# Patient Record
Sex: FEMALE | Race: BLACK OR AFRICAN AMERICAN | Employment: PART TIME | URBAN - METROPOLITAN AREA
[De-identification: names, ages, dates, MRNs, and addresses within clinical notes are randomized per-mention and may not be internally consistent; named-entity substitution may affect disease eponyms.]

---

## 2017-02-27 ENCOUNTER — HOSPITAL ENCOUNTER (OUTPATIENT)
Dept: LAB | Age: 52
Discharge: HOME | End: 2017-02-27
Payer: COMMERCIAL

## 2017-02-27 LAB
T4 FREE: 1.06 NG/DL (ref 0.93–1.7)
TSH SERPL DL<=0.05 MIU/L-ACNC: 1.51 UIU/ML (ref 0.27–4.2)

## 2017-02-27 PROCEDURE — 84443 ASSAY THYROID STIM HORMONE: CPT

## 2017-02-27 PROCEDURE — 84439 ASSAY OF FREE THYROXINE: CPT

## 2017-06-29 ENCOUNTER — HOSPITAL ENCOUNTER (OUTPATIENT)
Dept: WOMENS IMAGING | Age: 52
Discharge: HOME | End: 2017-06-29
Payer: COMMERCIAL

## 2017-06-29 DIAGNOSIS — Z12.31 ENCOUNTER FOR SCREENING MAMMOGRAM FOR BREAST CANCER: ICD-10-CM

## 2017-06-29 PROCEDURE — G0202 SCR MAMMO BI INCL CAD: HCPCS

## 2017-12-14 ENCOUNTER — INITIAL CONSULT (OUTPATIENT)
Dept: SURGERY | Age: 52
End: 2017-12-14

## 2017-12-14 VITALS
WEIGHT: 114 LBS | TEMPERATURE: 97.6 F | DIASTOLIC BLOOD PRESSURE: 90 MMHG | HEIGHT: 69 IN | RESPIRATION RATE: 12 BRPM | BODY MASS INDEX: 16.88 KG/M2 | HEART RATE: 100 BPM | SYSTOLIC BLOOD PRESSURE: 130 MMHG

## 2017-12-14 DIAGNOSIS — R10.9 ABDOMINAL PAIN, UNSPECIFIED ABDOMINAL LOCATION: Primary | ICD-10-CM

## 2017-12-14 PROCEDURE — 99204 OFFICE O/P NEW MOD 45 MIN: CPT | Performed by: SURGERY

## 2017-12-14 RX ORDER — LEVOTHYROXINE SODIUM 0.03 MG/1
TABLET ORAL
Refills: 5 | COMMUNITY
Start: 2017-11-06

## 2017-12-14 ASSESSMENT — ENCOUNTER SYMPTOMS
BACK PAIN: 0
EYE DISCHARGE: 0
TROUBLE SWALLOWING: 0
VOMITING: 0
CONSTIPATION: 0
STRIDOR: 0
CHEST TIGHTNESS: 0
EYE PAIN: 0
ANAL BLEEDING: 0
ABDOMINAL PAIN: 0
SHORTNESS OF BREATH: 0
COLOR CHANGE: 0

## 2017-12-14 NOTE — PROGRESS NOTES
Oneta Sensor      I have reviewed the Medical Assistant's entries in the Past Medical History, Medications, Allergies, Social History and Vital Sign sections      Chief Complaint   Patient presents with   Vergie Generous         HPI      46 F seen from [facility] and [place] for abd pain    Pt c/o weight loss of 15 lbs over last several months    Notes periodic n/v  Emesis not related to any one food or activity  Mild upper quadrant pain    Recent Ba swallow 12/17 neg    BMs irregular  No blood        S/P TAHBSO    S/P cholecystectomy  S/p appendectomy    No colonoscopy in [facility] or [facility]                                PMH    Yes                      Hypertension  No                      Lipids/cholesterol  No                      ASCAD  No                      Diabetes      hypthyroid      Smoker/ COPD    SOC        Social:                    Alcohol: admits to a bottle of vodka every 2 days          Past Medical History:   Diagnosis Date    Arthritis     Hyperlipidemia     Hypertension     Thyroid disease      Past Surgical History:   Procedure Laterality Date    APPENDECTOMY      BIOPSY LIP N/A 10/13/2016    EXCISION UPPER LIP LESION  performed by Lorena Mullins MD at [address] CHOLECYSTECTOMY      ENDOSCOPY, COLON, DIAGNOSTIC      HYSTERECTOMY           Current problems include  Patient Active Problem List   Diagnosis    Hyperthyroidism           Social History     Social History    Marital status:      Spouse name: N/A    Number of children: N/A    Years of education: N/A     Social History Main Topics    Smoking status: Current Every Day Smoker    Smokeless tobacco: Never Used    Alcohol use Yes    Drug use: Unknown    Sexual activity: Not Asked     Other Topics Concern    None     Social History Narrative    None                 No family history on file.       Current Outpatient Prescriptions   Medication Sig Dispense Refill    levothyroxine (SYNTHROID) 25 MCG tablet TAKE ONE TABLET BY MOUTH EVERY DAY  5    propranolol (INDERAL) 10 MG tablet Take 10 mg by mouth 2 times daily      Levothyroxine Sodium (LEVOTHROID PO) Take by mouth daily      vitamin B-1 (THIAMINE) 100 MG tablet Take by mouth three times daily      ferrous sulfate 325 (65 FE) MG tablet Take by mouth three times daily      meclizine (ANTIVERT) 12.5 MG tablet Take by mouth 3 times daily as needed      cephALEXin (KEFLEX) 250 MG capsule Take 1 capsule by mouth 3 times daily 20 capsule 0     No current facility-administered medications for this visit. No Known Allergies      Review of Systems   Constitutional: Negative for chills, fatigue, fever and unexpected weight change. HENT: Negative for nosebleeds and trouble swallowing. Eyes: Negative for pain and discharge. Respiratory: Negative for chest tightness, shortness of breath and stridor. Cardiovascular: Negative for chest pain, palpitations and leg swelling. Gastrointestinal: Negative for abdominal pain, anal bleeding, constipation and vomiting. Genitourinary: Negative for difficulty urinating, dysuria and hematuria. Musculoskeletal: Negative for back pain, joint swelling and neck pain. Skin: Negative for color change, rash and wound. Neurological: Negative for seizures, facial asymmetry, speech difficulty and headaches. Hematological: Negative for adenopathy. Does not bruise/bleed easily. Psychiatric/Behavioral: Negative for behavioral problems and hallucinations. The patient is not nervous/anxious. Vitals:    12/14/17 1047   BP: (!) 130/90   Pulse: 100   Resp: 12   Temp: 97.6 °F (36.4 °C)           Physical Exam   Constitutional: She is oriented to person, place, and time. She appears well-developed and well-nourished. HENT:   Head: Normocephalic and atraumatic. Eyes: EOM are normal. Pupils are equal, round, and reactive to light. Neck: Normal range of motion. Neck supple.    Cardiovascular: Normal rate and regular rhythm. No murmur heard. Pulmonary/Chest: Effort normal and breath sounds normal. No respiratory distress. She has no wheezes. She has no rales. Abdominal: She exhibits no distension and no mass. There is no tenderness. There is no rebound. Scars at umbilicus   Musculoskeletal: Normal range of motion. She exhibits no edema. Neurological: She is alert and oriented to person, place, and time. No cranial nerve deficit. Skin: Skin is warm and dry. Psychiatric: She has a normal mood and affect.  Her behavior is normal.               Assessment/      abd pain and weight loss of uncertain etiology  Acute and chronic EtOH  Change in BMs                Plan/      Will check CT A/P and colonoscopy     Re assess after above        Jorge Rojas MD

## 2017-12-15 ENCOUNTER — TELEPHONE (OUTPATIENT)
Dept: SURGERY | Age: 52
End: 2017-12-15

## 2017-12-15 NOTE — TELEPHONE ENCOUNTER
Patient was ordered a CT OF THE ABD AND PELVIS W/ CONTRAST. Called [facility] to get authorization. Had to go for further review, could not authorize at this time. Waiting to hear if they ok the scan.   Tracking # [id number]

## 2017-12-19 ENCOUNTER — HOSPITAL ENCOUNTER (OUTPATIENT)
Dept: LAB | Age: 52
Discharge: HOME | End: 2017-12-19
Payer: COMMERCIAL

## 2017-12-19 LAB
HCT VFR BLD CALC: 33.2 % (ref 37–47)
HEMOGLOBIN: 10.8 G/DL (ref 12–16)
MCH RBC QN AUTO: 32.3 PG (ref 27–31.3)
MCHC RBC AUTO-ENTMCNC: 32.4 % (ref 33–37)
MCV RBC AUTO: 99.7 FL (ref 82–100)
PDW BLD-RTO: 14.9 % (ref 11.5–14.5)
PLATELET # BLD: 223 K/UL (ref 130–400)
RBC # BLD: 3.33 M/UL (ref 4.2–5.4)
WBC # BLD: 4.4 K/UL (ref 4.8–10.8)

## 2017-12-19 PROCEDURE — 83690 ASSAY OF LIPASE: CPT

## 2017-12-19 PROCEDURE — 84443 ASSAY THYROID STIM HORMONE: CPT

## 2017-12-19 PROCEDURE — 85027 COMPLETE CBC AUTOMATED: CPT

## 2017-12-19 PROCEDURE — 82150 ASSAY OF AMYLASE: CPT

## 2017-12-19 PROCEDURE — 84439 ASSAY OF FREE THYROXINE: CPT

## 2017-12-19 PROCEDURE — 80053 COMPREHEN METABOLIC PANEL: CPT

## 2017-12-20 LAB
ALBUMIN SERPL-MCNC: 3.8 G/DL (ref 3.9–4.9)
ALP BLD-CCNC: 50 U/L (ref 40–130)
ALT SERPL-CCNC: 14 U/L (ref 0–33)
AMYLASE: 109 U/L (ref 28–100)
ANION GAP SERPL CALCULATED.3IONS-SCNC: 17 MEQ/L (ref 7–13)
AST SERPL-CCNC: 22 U/L (ref 0–35)
BILIRUB SERPL-MCNC: 0.4 MG/DL (ref 0–1.2)
BUN BLDV-MCNC: 9 MG/DL (ref 6–20)
CALCIUM SERPL-MCNC: 9 MG/DL (ref 8.6–10.2)
CHLORIDE BLD-SCNC: 101 MEQ/L (ref 98–107)
CO2: 23 MEQ/L (ref 22–29)
CREAT SERPL-MCNC: 0.38 MG/DL (ref 0.5–0.9)
GFR AFRICAN AMERICAN: >60
GFR NON-AFRICAN AMERICAN: >60
GLOBULIN: 2.6 G/DL (ref 2.3–3.5)
GLUCOSE BLD-MCNC: 73 MG/DL (ref 74–109)
LIPASE: 33 U/L (ref 13–60)
POTASSIUM SERPL-SCNC: 3.8 MEQ/L (ref 3.5–5.1)
SODIUM BLD-SCNC: 141 MEQ/L (ref 132–144)
T4 FREE: 1 NG/DL (ref 0.93–1.7)
TOTAL PROTEIN: 6.4 G/DL (ref 6.4–8.1)
TSH SERPL DL<=0.05 MIU/L-ACNC: 0.89 UIU/ML (ref 0.27–4.2)

## 2018-01-03 ENCOUNTER — TELEPHONE (OUTPATIENT)
Dept: SURGERY | Age: 53
End: 2018-01-03

## 2018-01-03 NOTE — TELEPHONE ENCOUNTER
[facility] My[facility] Jeanette called and was requiring about the status of her CT Scan Dr. Kaylen Champion had wanted her to have. I explained to her \" That her insurance was denying the test\" I explained to her Dr. Kaylen Champion will have to do a pier to pier with the insurance company to see if he can change their mind.  I will get with Dr. Kaylen Champion as soon as I see him back in the office, and call her back to let her know what is going on./ [facility]

## 2018-07-18 ENCOUNTER — HOSPITAL ENCOUNTER (OUTPATIENT)
Dept: LAB | Age: 53
Discharge: HOME | End: 2018-07-18
Payer: COMMERCIAL

## 2018-12-19 ENCOUNTER — HOSPITAL ENCOUNTER (OUTPATIENT)
Dept: WOMENS IMAGING | Age: 53
Discharge: HOME | End: 2018-12-21
Payer: COMMERCIAL

## 2018-12-19 DIAGNOSIS — Z12.31 ENCOUNTER FOR SCREENING MAMMOGRAM FOR BREAST CANCER: ICD-10-CM

## 2018-12-19 PROCEDURE — 77067 SCR MAMMO BI INCL CAD: CPT

## 2019-09-11 ENCOUNTER — HOSPITAL ENCOUNTER (OUTPATIENT)
Dept: LAB | Age: 54
Discharge: HOME | End: 2019-09-11
Payer: COMMERCIAL

## 2022-07-14 LAB
ALBUMIN: 4.7 G/DL (ref 3.4–5)
ALP BLD-CCNC: 152 U/L (ref 33–110)
ALT SERPL-CCNC: 42 U/L (ref 7–45)
ANION GAP SERPL CALCULATED.3IONS-SCNC: 20 MMOL/L (ref 10–20)
AST SERPL-CCNC: 107 U/L (ref 9–39)
BICARBONATE: 22 MMOL/L (ref 21–32)
BILIRUB SERPL-MCNC: 1.3 MG/DL (ref 0–1.2)
CALCIUM SERPL-MCNC: 9.9 MG/DL (ref 8.6–10.3)
CHLORIDE BLD-SCNC: 95 MMOL/L (ref 98–107)
CHOLESTEROL/HDL RATIO: 1.7
CHOLESTEROL: 265 MG/DL (ref 0–199)
CREAT SERPL-MCNC: 0.49 MG/DL (ref 0.5–1)
EGFR FEMALE: >90 ML/MIN/1.73M2
ERYTHROCYTE [DISTWIDTH] IN BLOOD BY AUTOMATED COUNT: 16.2 % (ref 11.5–14)
ESTIMATED AVERAGE GLUCOSE: 80 MG/DL
FOLATE: 7.7 NG/ML
GLUCOSE: 89 MG/DL (ref 74–99)
HBA1C MFR BLD: 4.4 %
HCT VFR BLD CALC: 34.1 % (ref 36–46)
HDLC SERPL-MCNC: 158 MG/DL
HEMOGLOBIN: 11.4 G/DL (ref 12–16)
LDL CHOLESTEROL: 95 MG/DL (ref 0–99)
MCHC RBC AUTO-ENTMCNC: 33.4 G/DL (ref 32–36)
MCV RBC AUTO: 100 FL (ref 80–100)
NUCLEATED RBCS: 0.4 /100 WBC (ref 0–0)
PLATELET # BLD: 150 X10E9/L (ref 150–450)
POTASSIUM SERPL-SCNC: 3.4 MMOL/L (ref 3.5–5.3)
RBC # BLD: 3.41 X10E12/L (ref 4–5.2)
SODIUM BLD-SCNC: 134 MMOL/L (ref 136–145)
TOTAL PROTEIN: 7.8 G/DL (ref 6.4–8.2)
TRIGL SERPL-MCNC: 58 MG/DL (ref 0–149)
TSH SERPL DL<=0.05 MIU/L-ACNC: 1.65 MIU/L (ref 0.44–3.9)
UREA NITROGEN: 11 MG/DL (ref 6–23)
VITAMIN B-12: 298 PG/ML (ref 211–911)
VITAMIN D 25-HYDROXY: 13 NG/ML
VLDLC SERPL CALC-MCNC: 12 MG/DL (ref 0–40)
WBC: 5 X10E9/L (ref 4.4–11.3)

## 2022-07-15 LAB — ANA WITH REFLEX TO ENA: NEGATIVE

## 2023-03-27 ENCOUNTER — TELEPHONE (OUTPATIENT)
Dept: PRIMARY CARE | Facility: CLINIC | Age: 58
End: 2023-03-27
Payer: MEDICAID

## 2023-03-28 LAB
AMMONIA (UMOL/L) IN PLASMA: 58 UMOL/L
AMMONIA: 58 UMOL/L
ANION GAP IN SER/PLAS: 15 MMOL/L (ref 10–20)
CALCIDIOL (25 OH VITAMIN D3) (NG/ML) IN SER/PLAS: 85 NG/ML
CALCIUM (MG/DL) IN SER/PLAS: 10.2 MG/DL (ref 8.6–10.3)
CARBON DIOXIDE, TOTAL (MMOL/L) IN SER/PLAS: 27 MMOL/L (ref 21–32)
CHLORIDE (MMOL/L) IN SER/PLAS: 99 MMOL/L (ref 98–107)
COBALAMIN (VITAMIN B12) (PG/ML) IN SER/PLAS: 321 PG/ML (ref 211–911)
CREATININE (MG/DL) IN SER/PLAS: 0.57 MG/DL (ref 0.5–1.05)
ERYTHROCYTE DISTRIBUTION WIDTH (RATIO) BY AUTOMATED COUNT: 16.2 % (ref 11.5–14.5)
ERYTHROCYTE MEAN CORPUSCULAR HEMOGLOBIN CONCENTRATION (G/DL) BY AUTOMATED: 33.3 G/DL (ref 32–36)
ERYTHROCYTE MEAN CORPUSCULAR VOLUME (FL) BY AUTOMATED COUNT: 99 FL (ref 80–100)
ERYTHROCYTE [DISTWIDTH] IN BLOOD BY AUTOMATED COUNT: 16.2 % (ref 11.5–14)
ERYTHROCYTES (10*6/UL) IN BLOOD BY AUTOMATED COUNT: 3.74 X10E12/L (ref 4–5.2)
ESTIMATED AVERAGE GLUCOSE FOR HBA1C: 85 MG/DL
ESTIMATED AVERAGE GLUCOSE: 85 MG/DL
FOLATE (NG/ML) IN SER/PLAS: 9.6 NG/ML
FOLATE: 9.6 NG/ML
GFR FEMALE: >90 ML/MIN/1.73M2
GLUCOSE (MG/DL) IN SER/PLAS: 108 MG/DL (ref 74–99)
HBA1C MFR BLD: 4.6 %
HCT VFR BLD CALC: 37.2 % (ref 36–46)
HEMATOCRIT (%) IN BLOOD BY AUTOMATED COUNT: 37.2 % (ref 36–46)
HEMOGLOBIN (G/DL) IN BLOOD: 12.4 G/DL (ref 12–16)
HEMOGLOBIN A1C/HEMOGLOBIN TOTAL IN BLOOD: 4.6 %
HEMOGLOBIN: 12.4 G/DL (ref 12–16)
LEUKOCYTES (10*3/UL) IN BLOOD BY AUTOMATED COUNT: 4.6 X10E9/L (ref 4.4–11.3)
MCHC RBC AUTO-ENTMCNC: 33.3 G/DL (ref 32–36)
MCV RBC AUTO: 99 FL (ref 80–100)
PLATELET # BLD: 245 X10E9/L (ref 150–450)
PLATELETS (10*3/UL) IN BLOOD AUTOMATED COUNT: 245 X10E9/L (ref 150–450)
POTASSIUM (MMOL/L) IN SER/PLAS: 3.5 MMOL/L (ref 3.5–5.3)
RBC # BLD: 3.74 X10E12/L (ref 4–5.2)
SODIUM (MMOL/L) IN SER/PLAS: 137 MMOL/L (ref 136–145)
THYROTROPIN (MIU/L) IN SER/PLAS BY DETECTION LIMIT <= 0.05 MIU/L: 1.5 MIU/L (ref 0.44–3.98)
TSH SERPL DL<=0.05 MIU/L-ACNC: 1.5 MIU/L (ref 0.44–3.9)
URATE (MG/DL) IN SER/PLAS: 5.8 MG/DL (ref 2.3–6.7)
UREA NITROGEN (MG/DL) IN SER/PLAS: 10 MG/DL (ref 6–23)
URIC ACID: 5.8 MG/DL (ref 2.3–6.7)
VITAMIN B-12: 321 PG/ML (ref 211–911)
VITAMIN D 25-HYDROXY: 85 NG/ML
WBC: 4.6 X10E9/L (ref 4.4–11.3)

## 2023-03-31 LAB
ALBUMIN ELP: 4.3 G/DL (ref 3.4–5)
ALBUMIN SERPL-MCNC: 4.3 G/DL (ref 3.4–5)
ALPHA 1: 0.3 G/DL (ref 0.2–0.6)
ALPHA 1: 0.3 G/DL (ref 0.2–0.6)
ALPHA 2: 0.9 G/DL (ref 0.4–1.1)
ALPHA 2: 0.9 G/DL (ref 0.4–1.1)
BETA: 0.9 G/DL (ref 0.5–1.2)
BETA: 0.9 G/DL (ref 0.5–1.2)
GAMMA GLOBULIN: 1 G/DL (ref 0.5–1.4)
GAMMA: 1 G/DL (ref 0.5–1.4)
IMMUNOFIXATION RESULT, SERUM: NORMAL
INTERPRETATION: NORMAL
PATH REVIEW - SERUM IMMUNOFIXATION: NORMAL
PATH REVIEW-SERUM PROTEIN ELECTROPHORESIS: NORMAL
PATHOLOGIST REVIEW: NORMAL
PATHOLOGIST REVIEW: NORMAL
PROTEIN ELECTROPHORESIS INTERPRETATION: NORMAL
PROTEIN TOTAL: 7.4 G/DL (ref 6.4–8.2)
SERUM IMMUNOFIXATION INTERPRETATION: NORMAL
TOTAL PROTEIN: 7.4 G/DL (ref 6.4–8.2)

## 2023-08-15 LAB
ALANINE AMINOTRANSFERASE (SGPT) (U/L) IN SER/PLAS: 18 U/L (ref 7–45)
ALBUMIN (G/DL) IN SER/PLAS: 4.1 G/DL (ref 3.4–5)
ALBUMIN (MG/L) IN URINE: 204 MG/L
ALBUMIN/CREATININE (UG/MG) IN URINE: 47.9 UG/MG CRT (ref 0–30)
ALKALINE PHOSPHATASE (U/L) IN SER/PLAS: 82 U/L (ref 33–110)
ANION GAP IN SER/PLAS: 15 MMOL/L (ref 10–20)
APPEARANCE, URINE: ABNORMAL
ASPARTATE AMINOTRANSFERASE (SGOT) (U/L) IN SER/PLAS: 54 U/L (ref 9–39)
BACTERIA, URINE: ABNORMAL /HPF
BASOPHILS (10*3/UL) IN BLOOD BY AUTOMATED COUNT: 0.03 X10E9/L (ref 0–0.1)
BASOPHILS/100 LEUKOCYTES IN BLOOD BY AUTOMATED COUNT: 0.5 % (ref 0–2)
BILIRUBIN TOTAL (MG/DL) IN SER/PLAS: 1.7 MG/DL (ref 0–1.2)
BILIRUBIN, URINE: ABNORMAL
BLOOD, URINE: ABNORMAL
CALCIDIOL (25 OH VITAMIN D3) (NG/ML) IN SER/PLAS: 44 NG/ML
CALCIUM (MG/DL) IN SER/PLAS: 9.6 MG/DL (ref 8.6–10.3)
CARBON DIOXIDE, TOTAL (MMOL/L) IN SER/PLAS: 28 MMOL/L (ref 21–32)
CHLORIDE (MMOL/L) IN SER/PLAS: 98 MMOL/L (ref 98–107)
CHOLESTEROL (MG/DL) IN SER/PLAS: 196 MG/DL (ref 0–199)
CHOLESTEROL IN HDL (MG/DL) IN SER/PLAS: 123.6 MG/DL
CHOLESTEROL/HDL RATIO: 1.6
COBALAMIN (VITAMIN B12) (PG/ML) IN SER/PLAS: 248 PG/ML (ref 211–911)
COLOR, URINE: ABNORMAL
CREATINE KINASE (U/L) IN SER/PLAS: 43 U/L (ref 0–215)
CREATININE (MG/DL) IN SER/PLAS: 0.53 MG/DL (ref 0.5–1.05)
CREATININE (MG/DL) IN URINE: 426 MG/DL (ref 20–320)
CREATININE (MG/DL) IN URINE: 426 MG/DL (ref 20–320)
EOSINOPHILS (10*3/UL) IN BLOOD BY AUTOMATED COUNT: 0.02 X10E9/L (ref 0–0.7)
EOSINOPHILS/100 LEUKOCYTES IN BLOOD BY AUTOMATED COUNT: 0.3 % (ref 0–6)
ERYTHROCYTE DISTRIBUTION WIDTH (RATIO) BY AUTOMATED COUNT: 18 % (ref 11.5–14.5)
ERYTHROCYTE MEAN CORPUSCULAR HEMOGLOBIN CONCENTRATION (G/DL) BY AUTOMATED: 33.2 G/DL (ref 32–36)
ERYTHROCYTE MEAN CORPUSCULAR VOLUME (FL) BY AUTOMATED COUNT: 106 FL (ref 80–100)
ERYTHROCYTES (10*6/UL) IN BLOOD BY AUTOMATED COUNT: 2.93 X10E12/L (ref 4–5.2)
FERRITIN (UG/LL) IN SER/PLAS: 889 UG/L (ref 8–150)
GFR FEMALE: >90 ML/MIN/1.73M2
GLUCOSE (MG/DL) IN SER/PLAS: 96 MG/DL (ref 74–99)
GLUCOSE, URINE: NEGATIVE MG/DL
HEMATOCRIT (%) IN BLOOD BY AUTOMATED COUNT: 31 % (ref 36–46)
HEMOGLOBIN (G/DL) IN BLOOD: 10.3 G/DL (ref 12–16)
IMMATURE GRANULOCYTES/100 LEUKOCYTES IN BLOOD BY AUTOMATED COUNT: 0.3 % (ref 0–0.9)
IRON (UG/DL) IN SER/PLAS: 49 UG/DL (ref 35–150)
IRON BINDING CAPACITY (UG/DL) IN SER/PLAS: 339 UG/DL (ref 240–445)
IRON SATURATION (%) IN SER/PLAS: 14 % (ref 25–45)
KETONES, URINE: ABNORMAL MG/DL
LDL: 60 MG/DL (ref 0–99)
LEUKOCYTE ESTERASE, URINE: ABNORMAL
LEUKOCYTES (10*3/UL) IN BLOOD BY AUTOMATED COUNT: 5.8 X10E9/L (ref 4.4–11.3)
LYMPHOCYTES (10*3/UL) IN BLOOD BY AUTOMATED COUNT: 1.15 X10E9/L (ref 1.2–4.8)
LYMPHOCYTES/100 LEUKOCYTES IN BLOOD BY AUTOMATED COUNT: 19.8 % (ref 13–44)
MAGNESIUM (MG/DL) IN SER/PLAS: 1.27 MG/DL (ref 1.6–2.4)
MONOCYTES (10*3/UL) IN BLOOD BY AUTOMATED COUNT: 0.33 X10E9/L (ref 0.1–1)
MONOCYTES/100 LEUKOCYTES IN BLOOD BY AUTOMATED COUNT: 5.7 % (ref 2–10)
MUCUS, URINE: ABNORMAL /LPF
NEUTROPHILS (10*3/UL) IN BLOOD BY AUTOMATED COUNT: 4.25 X10E9/L (ref 1.2–7.7)
NEUTROPHILS/100 LEUKOCYTES IN BLOOD BY AUTOMATED COUNT: 73.4 % (ref 40–80)
NITRITE, URINE: POSITIVE
NRBC (PER 100 WBCS) BY AUTOMATED COUNT: 0.5 /100 WBC (ref 0–0)
PH, URINE: 5 (ref 5–8)
PLATELETS (10*3/UL) IN BLOOD AUTOMATED COUNT: 268 X10E9/L (ref 150–450)
POTASSIUM (MMOL/L) IN SER/PLAS: 4.1 MMOL/L (ref 3.5–5.3)
PROTEIN (MG/DL) IN URINE: 60 MG/DL (ref 5–24)
PROTEIN TOTAL: 7.3 G/DL (ref 6.4–8.2)
PROTEIN, URINE: ABNORMAL MG/DL
PROTEIN/CREATININE (MG/MG) IN URINE: 0.14 MG/MG CREAT (ref 0–0.17)
RBC, URINE: ABNORMAL /HPF (ref 0–5)
RHEUMATOID FACTOR (IU/ML) IN SERUM OR PLASMA: 10 IU/ML (ref 0–15)
SEDIMENTATION RATE, ERYTHROCYTE: 10 MM/H (ref 0–30)
SODIUM (MMOL/L) IN SER/PLAS: 137 MMOL/L (ref 136–145)
SPECIFIC GRAVITY, URINE: 1.02 (ref 1–1.03)
SQUAMOUS EPITHELIAL CELLS, URINE: 2 /HPF
THYROTROPIN (MIU/L) IN SER/PLAS BY DETECTION LIMIT <= 0.05 MIU/L: 1.67 MIU/L (ref 0.44–3.98)
TRIGLYCERIDE (MG/DL) IN SER/PLAS: 63 MG/DL (ref 0–149)
URATE (MG/DL) IN SER/PLAS: 4.8 MG/DL (ref 2.3–6.7)
UREA NITROGEN (MG/DL) IN SER/PLAS: 7 MG/DL (ref 6–23)
UROBILINOGEN, URINE: 4 MG/DL (ref 0–1.9)
VLDL: 13 MG/DL (ref 0–40)
WBC, URINE: >182 /HPF (ref 0–5)

## 2023-08-16 LAB — ANTI-NUCLEAR ANTIBODY (ANA): NEGATIVE

## 2023-10-03 ENCOUNTER — APPOINTMENT (OUTPATIENT)
Dept: RADIOLOGY | Facility: HOSPITAL | Age: 58
End: 2023-10-03
Payer: MEDICAID

## 2023-10-09 ENCOUNTER — TELEPHONE (OUTPATIENT)
Dept: ORTHOPEDIC SURGERY | Facility: CLINIC | Age: 58
End: 2023-10-09
Payer: MEDICAID

## 2023-10-09 NOTE — TELEPHONE ENCOUNTER
VIKA Daigle and David (?) . Letter , questionnaire and patient release was mailed on 9-21 . She is reaching out for an update .

## 2023-10-11 ENCOUNTER — HOSPITAL ENCOUNTER (OUTPATIENT)
Dept: RADIOLOGY | Facility: HOSPITAL | Age: 58
Discharge: HOME | End: 2023-10-11
Payer: MEDICAID

## 2023-10-11 DIAGNOSIS — N23 UNSPECIFIED RENAL COLIC: ICD-10-CM

## 2023-10-11 PROCEDURE — 76770 US EXAM ABDO BACK WALL COMP: CPT | Performed by: RADIOLOGY

## 2023-10-11 PROCEDURE — 76770 US EXAM ABDO BACK WALL COMP: CPT

## 2023-10-30 PROBLEM — F17.200 NICOTINE DEPENDENCE: Status: ACTIVE | Noted: 2023-10-30

## 2023-10-30 PROBLEM — R27.0 ATAXIA, UNSPECIFIED: Status: ACTIVE | Noted: 2023-10-30

## 2023-10-30 PROBLEM — I10 HYPERTENSION: Status: ACTIVE | Noted: 2023-10-30

## 2023-10-30 PROBLEM — I77.9 PERIPHERAL ARTERIAL OCCLUSIVE DISEASE (CMS-HCC): Status: ACTIVE | Noted: 2023-10-30

## 2023-10-30 PROBLEM — M17.0 PRIMARY LOCALIZED OSTEOARTHRITIS OF KNEES, BILATERAL: Status: ACTIVE | Noted: 2023-06-14

## 2023-10-30 PROBLEM — R29.6 REPEATED FALLS: Status: ACTIVE | Noted: 2023-10-30

## 2023-10-30 PROBLEM — G62.9 PERIPHERAL NEUROPATHY: Status: ACTIVE | Noted: 2023-10-30

## 2023-10-30 PROBLEM — J44.9 COPD (CHRONIC OBSTRUCTIVE PULMONARY DISEASE) (MULTI): Status: ACTIVE | Noted: 2023-06-14

## 2023-10-30 PROBLEM — M19.071: Status: ACTIVE | Noted: 2023-10-30

## 2023-10-30 PROBLEM — K59.09 CHRONIC CONSTIPATION: Status: ACTIVE | Noted: 2023-10-30

## 2023-10-30 PROBLEM — I95.1 ORTHOSTATIC HYPOTENSION: Status: ACTIVE | Noted: 2023-10-30

## 2023-10-30 PROBLEM — M19.072: Status: ACTIVE | Noted: 2023-10-30

## 2023-10-30 PROBLEM — E46 MALNUTRITION, CALORIE (MULTI): Status: ACTIVE | Noted: 2023-10-30

## 2023-10-30 PROBLEM — E87.6 LOW BLOOD POTASSIUM: Status: ACTIVE | Noted: 2023-10-30

## 2023-10-30 PROBLEM — M15.9 PRIMARY OSTEOARTHRITIS INVOLVING MULTIPLE JOINTS: Status: ACTIVE | Noted: 2023-10-30

## 2023-10-30 PROBLEM — R63.4 UNINTENDED WEIGHT LOSS: Status: ACTIVE | Noted: 2023-10-30

## 2023-10-30 PROBLEM — G72.9 MYOPATHY, UNSPECIFIED: Status: ACTIVE | Noted: 2023-06-14

## 2023-10-30 PROBLEM — R42 DIZZINESS: Status: ACTIVE | Noted: 2023-10-30

## 2023-10-30 PROBLEM — E44.0 MODERATE PROTEIN-CALORIE MALNUTRITION (WEIGHT FOR AGE 60-74% OF STANDARD) (MULTI): Status: ACTIVE | Noted: 2023-10-30

## 2023-10-30 PROBLEM — M15.0 PRIMARY OSTEOARTHRITIS INVOLVING MULTIPLE JOINTS: Status: ACTIVE | Noted: 2023-10-30

## 2023-10-30 PROBLEM — K25.9 GASTRIC ULCER: Status: ACTIVE | Noted: 2023-10-30

## 2023-10-30 RX ORDER — DIPHENHYDRAMINE HCL 25 MG
4 CAPSULE ORAL 4 TIMES DAILY
Status: ON HOLD | COMMUNITY
Start: 2023-08-25 | End: 2024-03-26 | Stop reason: ALTCHOICE

## 2023-10-30 RX ORDER — LANOLIN ALCOHOL/MO/W.PET/CERES
1000 CREAM (GRAM) TOPICAL DAILY
COMMUNITY
Start: 2023-08-21

## 2023-10-30 RX ORDER — LEVOTHYROXINE SODIUM 25 UG/1
1 TABLET ORAL DAILY
Status: ON HOLD | COMMUNITY
Start: 2017-11-06 | End: 2024-03-26 | Stop reason: ALTCHOICE

## 2023-10-30 RX ORDER — PANTOPRAZOLE SODIUM 40 MG/1
40 TABLET, DELAYED RELEASE ORAL DAILY
Status: ON HOLD | COMMUNITY
Start: 2023-08-01 | End: 2024-03-26 | Stop reason: ALTCHOICE

## 2023-10-30 RX ORDER — BUPROPION HYDROCHLORIDE 150 MG/1
150 TABLET ORAL
Status: ON HOLD | COMMUNITY
Start: 2023-10-02 | End: 2024-03-26

## 2023-10-30 RX ORDER — LANOLIN ALCOHOL/MO/W.PET/CERES
1 CREAM (GRAM) TOPICAL 2 TIMES DAILY
COMMUNITY
Start: 2023-09-15

## 2023-10-30 RX ORDER — CYANOCOBALAMIN 1000 UG/ML
1000 INJECTION, SOLUTION INTRAMUSCULAR; SUBCUTANEOUS
Status: ON HOLD | COMMUNITY
Start: 2023-09-15 | End: 2024-03-26 | Stop reason: ALTCHOICE

## 2023-10-30 RX ORDER — MECLIZINE HCL 12.5 MG 12.5 MG/1
12.5 TABLET ORAL 3 TIMES DAILY PRN
Status: ON HOLD | COMMUNITY
End: 2024-03-26 | Stop reason: ALTCHOICE

## 2023-10-30 RX ORDER — FERROUS SULFATE 325(65) MG
65 TABLET ORAL
Status: ON HOLD | COMMUNITY
End: 2024-03-26 | Stop reason: ALTCHOICE

## 2023-10-30 RX ORDER — MELOXICAM 15 MG/1
15 TABLET ORAL DAILY
Status: ON HOLD | COMMUNITY
Start: 2023-10-04 | End: 2024-03-26 | Stop reason: ALTCHOICE

## 2023-10-30 RX ORDER — MIRTAZAPINE 15 MG/1
15 TABLET, FILM COATED ORAL NIGHTLY
Status: ON HOLD | COMMUNITY
Start: 2023-08-21 | End: 2024-03-26 | Stop reason: ALTCHOICE

## 2023-10-31 ENCOUNTER — APPOINTMENT (OUTPATIENT)
Dept: ORTHOPEDIC SURGERY | Facility: CLINIC | Age: 58
End: 2023-10-31
Payer: MEDICAID

## 2023-11-14 ENCOUNTER — APPOINTMENT (OUTPATIENT)
Dept: ORTHOPEDIC SURGERY | Facility: CLINIC | Age: 58
End: 2023-11-14
Payer: MEDICAID

## 2023-11-20 ENCOUNTER — LAB (OUTPATIENT)
Dept: LAB | Facility: LAB | Age: 58
End: 2023-11-20
Payer: MEDICAID

## 2023-11-20 DIAGNOSIS — D64.9 ANEMIA, UNSPECIFIED: ICD-10-CM

## 2023-11-20 DIAGNOSIS — M79.10 MYALGIA, UNSPECIFIED SITE: ICD-10-CM

## 2023-11-20 DIAGNOSIS — I10 ESSENTIAL (PRIMARY) HYPERTENSION: Primary | ICD-10-CM

## 2023-11-20 DIAGNOSIS — N39.0 URINARY TRACT INFECTION, SITE NOT SPECIFIED: ICD-10-CM

## 2023-11-20 DIAGNOSIS — E55.9 VITAMIN D DEFICIENCY, UNSPECIFIED: ICD-10-CM

## 2023-11-20 DIAGNOSIS — E78.5 HYPERLIPIDEMIA, UNSPECIFIED: ICD-10-CM

## 2023-11-20 DIAGNOSIS — M25.50 PAIN IN UNSPECIFIED JOINT: ICD-10-CM

## 2023-11-20 LAB
25(OH)D3 SERPL-MCNC: 30 NG/ML (ref 30–100)
ALBUMIN SERPL BCP-MCNC: 4.5 G/DL (ref 3.4–5)
ALP SERPL-CCNC: 96 U/L (ref 33–110)
ALT SERPL W P-5'-P-CCNC: 21 U/L (ref 7–45)
ANION GAP SERPL CALC-SCNC: 17 MMOL/L (ref 10–20)
APPEARANCE UR: CLEAR
AST SERPL W P-5'-P-CCNC: 61 U/L (ref 9–39)
BASOPHILS # BLD AUTO: 0.06 X10*3/UL (ref 0–0.1)
BASOPHILS NFR BLD AUTO: 1.4 %
BILIRUB SERPL-MCNC: 0.8 MG/DL (ref 0–1.2)
BILIRUB UR STRIP.AUTO-MCNC: NEGATIVE MG/DL
BUN SERPL-MCNC: 10 MG/DL (ref 6–23)
CALCIUM SERPL-MCNC: 9.9 MG/DL (ref 8.6–10.3)
CHLORIDE SERPL-SCNC: 95 MMOL/L (ref 98–107)
CHOLEST SERPL-MCNC: 279 MG/DL (ref 0–199)
CHOLESTEROL/HDL RATIO: 1.5
CK SERPL-CCNC: 48 U/L (ref 0–215)
CO2 SERPL-SCNC: 27 MMOL/L (ref 21–32)
COLOR UR: YELLOW
CREAT SERPL-MCNC: 0.55 MG/DL (ref 0.5–1.05)
CREAT UR-MCNC: 100.4 MG/DL (ref 20–320)
CREAT UR-MCNC: 100.4 MG/DL (ref 20–320)
EOSINOPHIL # BLD AUTO: 0.05 X10*3/UL (ref 0–0.7)
EOSINOPHIL NFR BLD AUTO: 1.2 %
ERYTHROCYTE [DISTWIDTH] IN BLOOD BY AUTOMATED COUNT: 19.3 % (ref 11.5–14.5)
ERYTHROCYTE [SEDIMENTATION RATE] IN BLOOD BY WESTERGREN METHOD: 18 MM/H (ref 0–30)
FERRITIN SERPL-MCNC: 764 NG/ML (ref 8–150)
GFR SERPL CREATININE-BSD FRML MDRD: >90 ML/MIN/1.73M*2
GLUCOSE SERPL-MCNC: 98 MG/DL (ref 74–99)
GLUCOSE UR STRIP.AUTO-MCNC: NEGATIVE MG/DL
HCT VFR BLD AUTO: 32.8 % (ref 36–46)
HDLC SERPL-MCNC: 185.6 MG/DL
HGB BLD-MCNC: 10.9 G/DL (ref 12–16)
IMM GRANULOCYTES # BLD AUTO: 0.01 X10*3/UL (ref 0–0.7)
IMM GRANULOCYTES NFR BLD AUTO: 0.2 % (ref 0–0.9)
IRON SATN MFR SERPL: 44 % (ref 25–45)
IRON SERPL-MCNC: 161 UG/DL (ref 35–150)
KETONES UR STRIP.AUTO-MCNC: ABNORMAL MG/DL
LDLC SERPL CALC-MCNC: 67 MG/DL
LEUKOCYTE ESTERASE UR QL STRIP.AUTO: NEGATIVE
LYMPHOCYTES # BLD AUTO: 1.32 X10*3/UL (ref 1.2–4.8)
LYMPHOCYTES NFR BLD AUTO: 30.8 %
MAGNESIUM SERPL-MCNC: 1.65 MG/DL (ref 1.6–2.4)
MCH RBC QN AUTO: 33.9 PG (ref 26–34)
MCHC RBC AUTO-ENTMCNC: 33.2 G/DL (ref 32–36)
MCV RBC AUTO: 102 FL (ref 80–100)
MICROALBUMIN UR-MCNC: 8.4 MG/L
MICROALBUMIN/CREAT UR: 8.4 UG/MG CREAT
MONOCYTES # BLD AUTO: 0.39 X10*3/UL (ref 0.1–1)
MONOCYTES NFR BLD AUTO: 9.1 %
NEUTROPHILS # BLD AUTO: 2.46 X10*3/UL (ref 1.2–7.7)
NEUTROPHILS NFR BLD AUTO: 57.3 %
NITRITE UR QL STRIP.AUTO: NEGATIVE
NON HDL CHOLESTEROL: 93 MG/DL (ref 0–149)
NRBC BLD-RTO: 0.5 /100 WBCS (ref 0–0)
PH UR STRIP.AUTO: 6 [PH]
PLATELET # BLD AUTO: 288 X10*3/UL (ref 150–450)
POTASSIUM SERPL-SCNC: 3.9 MMOL/L (ref 3.5–5.3)
PROT SERPL-MCNC: 7.5 G/DL (ref 6.4–8.2)
PROT UR STRIP.AUTO-MCNC: NEGATIVE MG/DL
PROT UR-ACNC: 8 MG/DL (ref 5–24)
PROT/CREAT UR: 0.08 MG/MG CREAT (ref 0–0.17)
RBC # BLD AUTO: 3.22 X10*6/UL (ref 4–5.2)
RBC # UR STRIP.AUTO: NEGATIVE /UL
RHEUMATOID FACT SER NEPH-ACNC: 13 IU/ML (ref 0–15)
SODIUM SERPL-SCNC: 135 MMOL/L (ref 136–145)
SP GR UR STRIP.AUTO: 1.01
TIBC SERPL-MCNC: 369 UG/DL (ref 240–445)
TRIGL SERPL-MCNC: 132 MG/DL (ref 0–149)
TSH SERPL-ACNC: 2.67 MIU/L (ref 0.44–3.98)
UIBC SERPL-MCNC: 208 UG/DL (ref 110–370)
URATE SERPL-MCNC: 6.3 MG/DL (ref 2.3–6.7)
UROBILINOGEN UR STRIP.AUTO-MCNC: <2 MG/DL
VIT B12 SERPL-MCNC: 1634 PG/ML (ref 211–911)
VLDL: 26 MG/DL (ref 0–40)
WBC # BLD AUTO: 4.3 X10*3/UL (ref 4.4–11.3)

## 2023-11-20 PROCEDURE — 81003 URINALYSIS AUTO W/O SCOPE: CPT

## 2023-11-20 PROCEDURE — 80053 COMPREHEN METABOLIC PANEL: CPT

## 2023-11-20 PROCEDURE — 86038 ANTINUCLEAR ANTIBODIES: CPT

## 2023-11-20 PROCEDURE — 82607 VITAMIN B-12: CPT

## 2023-11-20 PROCEDURE — 87086 URINE CULTURE/COLONY COUNT: CPT

## 2023-11-20 PROCEDURE — 82570 ASSAY OF URINE CREATININE: CPT

## 2023-11-20 PROCEDURE — 80061 LIPID PANEL: CPT

## 2023-11-20 PROCEDURE — 85025 COMPLETE CBC W/AUTO DIFF WBC: CPT

## 2023-11-20 PROCEDURE — 85652 RBC SED RATE AUTOMATED: CPT

## 2023-11-20 PROCEDURE — 84550 ASSAY OF BLOOD/URIC ACID: CPT

## 2023-11-20 PROCEDURE — 82043 UR ALBUMIN QUANTITATIVE: CPT

## 2023-11-20 PROCEDURE — 84156 ASSAY OF PROTEIN URINE: CPT

## 2023-11-20 PROCEDURE — 83735 ASSAY OF MAGNESIUM: CPT

## 2023-11-20 PROCEDURE — 82728 ASSAY OF FERRITIN: CPT

## 2023-11-20 PROCEDURE — 36415 COLL VENOUS BLD VENIPUNCTURE: CPT

## 2023-11-20 PROCEDURE — 83550 IRON BINDING TEST: CPT

## 2023-11-20 PROCEDURE — 82550 ASSAY OF CK (CPK): CPT

## 2023-11-20 PROCEDURE — 87186 SC STD MICRODIL/AGAR DIL: CPT

## 2023-11-20 PROCEDURE — 86431 RHEUMATOID FACTOR QUANT: CPT

## 2023-11-20 PROCEDURE — 83540 ASSAY OF IRON: CPT

## 2023-11-20 PROCEDURE — 84443 ASSAY THYROID STIM HORMONE: CPT

## 2023-11-20 PROCEDURE — 82306 VITAMIN D 25 HYDROXY: CPT

## 2023-11-22 LAB — ANA SER QL HEP2 SUBST: NEGATIVE

## 2023-11-23 LAB — BACTERIA UR CULT: ABNORMAL

## 2024-03-25 ENCOUNTER — APPOINTMENT (OUTPATIENT)
Dept: RADIOLOGY | Facility: HOSPITAL | Age: 59
End: 2024-03-25
Payer: MEDICAID

## 2024-03-25 ENCOUNTER — HOSPITAL ENCOUNTER (INPATIENT)
Facility: HOSPITAL | Age: 59
LOS: 4 days | Discharge: NURSING, REHAB OR LONG-TERM CARE | End: 2024-03-30
Attending: STUDENT IN AN ORGANIZED HEALTH CARE EDUCATION/TRAINING PROGRAM | Admitting: STUDENT IN AN ORGANIZED HEALTH CARE EDUCATION/TRAINING PROGRAM
Payer: MEDICAID

## 2024-03-25 ENCOUNTER — APPOINTMENT (OUTPATIENT)
Dept: CARDIOLOGY | Facility: HOSPITAL | Age: 59
End: 2024-03-25
Payer: MEDICAID

## 2024-03-25 DIAGNOSIS — R29.6 FREQUENT FALLS: ICD-10-CM

## 2024-03-25 DIAGNOSIS — F10.90 ALCOHOL USE DISORDER: ICD-10-CM

## 2024-03-25 DIAGNOSIS — R53.1 GENERALIZED WEAKNESS: Primary | ICD-10-CM

## 2024-03-25 DIAGNOSIS — R11.2 NAUSEA VOMITING AND DIARRHEA: ICD-10-CM

## 2024-03-25 DIAGNOSIS — H04.123 DRY EYES: ICD-10-CM

## 2024-03-25 DIAGNOSIS — R10.84 GENERALIZED ABDOMINAL PAIN: ICD-10-CM

## 2024-03-25 DIAGNOSIS — R63.4 WEIGHT LOSS: ICD-10-CM

## 2024-03-25 DIAGNOSIS — R19.7 NAUSEA VOMITING AND DIARRHEA: ICD-10-CM

## 2024-03-25 DIAGNOSIS — E83.42 HYPOMAGNESEMIA: ICD-10-CM

## 2024-03-25 LAB
ALBUMIN SERPL BCP-MCNC: 4.6 G/DL (ref 3.4–5)
ALP SERPL-CCNC: 87 U/L (ref 33–110)
ALT SERPL W P-5'-P-CCNC: 21 U/L (ref 7–45)
AMMONIA PLAS-SCNC: 42 UMOL/L (ref 16–53)
ANION GAP SERPL CALC-SCNC: 27 MMOL/L (ref 10–20)
APAP SERPL-MCNC: <10 UG/ML
AST SERPL W P-5'-P-CCNC: 45 U/L (ref 9–39)
BASOPHILS # BLD AUTO: 0.03 X10*3/UL (ref 0–0.1)
BASOPHILS NFR BLD AUTO: 0.5 %
BILIRUB DIRECT SERPL-MCNC: 0.2 MG/DL (ref 0–0.3)
BILIRUB SERPL-MCNC: 0.6 MG/DL (ref 0–1.2)
BUN SERPL-MCNC: 25 MG/DL (ref 6–23)
CALCIUM SERPL-MCNC: 10.4 MG/DL (ref 8.6–10.3)
CARDIAC TROPONIN I PNL SERPL HS: 4 NG/L (ref 0–13)
CARDIAC TROPONIN I PNL SERPL HS: 4 NG/L (ref 0–13)
CHLORIDE SERPL-SCNC: 90 MMOL/L (ref 98–107)
CO2 SERPL-SCNC: 20 MMOL/L (ref 21–32)
CREAT SERPL-MCNC: 0.79 MG/DL (ref 0.5–1.05)
EGFRCR SERPLBLD CKD-EPI 2021: 86 ML/MIN/1.73M*2
EOSINOPHIL # BLD AUTO: 0.01 X10*3/UL (ref 0–0.7)
EOSINOPHIL NFR BLD AUTO: 0.2 %
ERYTHROCYTE [DISTWIDTH] IN BLOOD BY AUTOMATED COUNT: 15.9 % (ref 11.5–14.5)
ETHANOL SERPL-MCNC: <10 MG/DL
GGT SERPL-CCNC: 201 U/L (ref 5–55)
GLUCOSE SERPL-MCNC: 89 MG/DL (ref 74–99)
HCT VFR BLD AUTO: 29.8 % (ref 36–46)
HGB BLD-MCNC: 10.5 G/DL (ref 12–16)
IMM GRANULOCYTES # BLD AUTO: 0.11 X10*3/UL (ref 0–0.7)
IMM GRANULOCYTES NFR BLD AUTO: 2 % (ref 0–0.9)
INR PPP: 1 (ref 0.9–1.1)
LACTATE SERPL-SCNC: 1.9 MMOL/L (ref 0.4–2)
LIPASE SERPL-CCNC: 39 U/L (ref 9–82)
LYMPHOCYTES # BLD AUTO: 1.67 X10*3/UL (ref 1.2–4.8)
LYMPHOCYTES NFR BLD AUTO: 30 %
MAGNESIUM SERPL-MCNC: 1.59 MG/DL (ref 1.6–2.4)
MCH RBC QN AUTO: 35.7 PG (ref 26–34)
MCHC RBC AUTO-ENTMCNC: 35.2 G/DL (ref 32–36)
MCV RBC AUTO: 101 FL (ref 80–100)
MONOCYTES # BLD AUTO: 0.51 X10*3/UL (ref 0.1–1)
MONOCYTES NFR BLD AUTO: 9.2 %
NEUTROPHILS # BLD AUTO: 3.23 X10*3/UL (ref 1.2–7.7)
NEUTROPHILS NFR BLD AUTO: 58.1 %
NRBC BLD-RTO: 1.3 /100 WBCS (ref 0–0)
PLATELET # BLD AUTO: 422 X10*3/UL (ref 150–450)
POTASSIUM SERPL-SCNC: 4 MMOL/L (ref 3.5–5.3)
PROT SERPL-MCNC: 7.8 G/DL (ref 6.4–8.2)
PROTHROMBIN TIME: 10.8 SECONDS (ref 9.8–12.8)
RBC # BLD AUTO: 2.94 X10*6/UL (ref 4–5.2)
SALICYLATES SERPL-MCNC: <3 MG/DL
SODIUM SERPL-SCNC: 133 MMOL/L (ref 136–145)
TSH SERPL-ACNC: 1.98 MIU/L (ref 0.44–3.98)
WBC # BLD AUTO: 5.6 X10*3/UL (ref 4.4–11.3)

## 2024-03-25 PROCEDURE — 85025 COMPLETE CBC W/AUTO DIFF WBC: CPT | Performed by: PHYSICIAN ASSISTANT

## 2024-03-25 PROCEDURE — 96361 HYDRATE IV INFUSION ADD-ON: CPT

## 2024-03-25 PROCEDURE — 82140 ASSAY OF AMMONIA: CPT | Performed by: PHYSICIAN ASSISTANT

## 2024-03-25 PROCEDURE — 2500000001 HC RX 250 WO HCPCS SELF ADMINISTERED DRUGS (ALT 637 FOR MEDICARE OP): Performed by: PHYSICIAN ASSISTANT

## 2024-03-25 PROCEDURE — 72170 X-RAY EXAM OF PELVIS: CPT

## 2024-03-25 PROCEDURE — 70450 CT HEAD/BRAIN W/O DYE: CPT | Performed by: RADIOLOGY

## 2024-03-25 PROCEDURE — 99285 EMERGENCY DEPT VISIT HI MDM: CPT | Mod: 25

## 2024-03-25 PROCEDURE — 82248 BILIRUBIN DIRECT: CPT | Performed by: PHYSICIAN ASSISTANT

## 2024-03-25 PROCEDURE — 71045 X-RAY EXAM CHEST 1 VIEW: CPT | Performed by: RADIOLOGY

## 2024-03-25 PROCEDURE — 84484 ASSAY OF TROPONIN QUANT: CPT | Performed by: PHYSICIAN ASSISTANT

## 2024-03-25 PROCEDURE — 83735 ASSAY OF MAGNESIUM: CPT | Performed by: PHYSICIAN ASSISTANT

## 2024-03-25 PROCEDURE — 36415 COLL VENOUS BLD VENIPUNCTURE: CPT | Performed by: PHYSICIAN ASSISTANT

## 2024-03-25 PROCEDURE — 72125 CT NECK SPINE W/O DYE: CPT | Performed by: RADIOLOGY

## 2024-03-25 PROCEDURE — 72131 CT LUMBAR SPINE W/O DYE: CPT

## 2024-03-25 PROCEDURE — 72125 CT NECK SPINE W/O DYE: CPT

## 2024-03-25 PROCEDURE — 96374 THER/PROPH/DIAG INJ IV PUSH: CPT

## 2024-03-25 PROCEDURE — 80179 DRUG ASSAY SALICYLATE: CPT | Performed by: PHYSICIAN ASSISTANT

## 2024-03-25 PROCEDURE — 93005 ELECTROCARDIOGRAM TRACING: CPT

## 2024-03-25 PROCEDURE — 70450 CT HEAD/BRAIN W/O DYE: CPT

## 2024-03-25 PROCEDURE — 83605 ASSAY OF LACTIC ACID: CPT | Performed by: PHYSICIAN ASSISTANT

## 2024-03-25 PROCEDURE — 72170 X-RAY EXAM OF PELVIS: CPT | Performed by: RADIOLOGY

## 2024-03-25 PROCEDURE — 2500000004 HC RX 250 GENERAL PHARMACY W/ HCPCS (ALT 636 FOR OP/ED): Performed by: PHYSICIAN ASSISTANT

## 2024-03-25 PROCEDURE — 83690 ASSAY OF LIPASE: CPT | Performed by: PHYSICIAN ASSISTANT

## 2024-03-25 PROCEDURE — 71045 X-RAY EXAM CHEST 1 VIEW: CPT

## 2024-03-25 PROCEDURE — 85610 PROTHROMBIN TIME: CPT | Performed by: PHYSICIAN ASSISTANT

## 2024-03-25 PROCEDURE — 84443 ASSAY THYROID STIM HORMONE: CPT | Performed by: PHYSICIAN ASSISTANT

## 2024-03-25 PROCEDURE — 72128 CT CHEST SPINE W/O DYE: CPT

## 2024-03-25 PROCEDURE — 82977 ASSAY OF GGT: CPT | Performed by: PHYSICIAN ASSISTANT

## 2024-03-25 RX ORDER — LANOLIN ALCOHOL/MO/W.PET/CERES
100 CREAM (GRAM) TOPICAL ONCE
Status: COMPLETED | OUTPATIENT
Start: 2024-03-25 | End: 2024-03-25

## 2024-03-25 RX ORDER — LORAZEPAM 2 MG/ML
0.5 INJECTION INTRAMUSCULAR EVERY 2 HOUR PRN
Status: DISCONTINUED | OUTPATIENT
Start: 2024-03-25 | End: 2024-03-29

## 2024-03-25 RX ORDER — MULTIVIT-MIN/IRON FUM/FOLIC AC 7.5 MG-4
1 TABLET ORAL ONCE
Status: COMPLETED | OUTPATIENT
Start: 2024-03-25 | End: 2024-03-25

## 2024-03-25 RX ORDER — ONDANSETRON HYDROCHLORIDE 2 MG/ML
4 INJECTION, SOLUTION INTRAVENOUS ONCE
Status: COMPLETED | OUTPATIENT
Start: 2024-03-25 | End: 2024-03-25

## 2024-03-25 RX ORDER — LORAZEPAM 2 MG/ML
1 INJECTION INTRAMUSCULAR EVERY 2 HOUR PRN
Status: DISCONTINUED | OUTPATIENT
Start: 2024-03-25 | End: 2024-03-29

## 2024-03-25 RX ORDER — FOLIC ACID 1 MG/1
1 TABLET ORAL ONCE
Status: COMPLETED | OUTPATIENT
Start: 2024-03-25 | End: 2024-03-25

## 2024-03-25 RX ADMIN — Medication 1 TABLET: at 21:59

## 2024-03-25 RX ADMIN — ONDANSETRON 4 MG: 2 INJECTION INTRAMUSCULAR; INTRAVENOUS at 21:59

## 2024-03-25 RX ADMIN — FOLIC ACID 1 MG: 1 TABLET ORAL at 22:00

## 2024-03-25 RX ADMIN — SODIUM CHLORIDE, POTASSIUM CHLORIDE, SODIUM LACTATE AND CALCIUM CHLORIDE 1000 ML: 600; 310; 30; 20 INJECTION, SOLUTION INTRAVENOUS at 22:00

## 2024-03-25 RX ADMIN — Medication 100 MG: at 22:00

## 2024-03-25 ASSESSMENT — VISUAL ACUITY: OU: 1

## 2024-03-25 ASSESSMENT — PAIN - FUNCTIONAL ASSESSMENT: PAIN_FUNCTIONAL_ASSESSMENT: 0-10

## 2024-03-25 ASSESSMENT — LIFESTYLE VARIABLES
HEADACHE, FULLNESS IN HEAD: NOT PRESENT
PAROXYSMAL SWEATS: NO SWEAT VISIBLE
TOTAL SCORE: 4
AUDITORY DISTURBANCES: NOT PRESENT
BLOOD PRESSURE: 114/69
AGITATION: NORMAL ACTIVITY
ANXIETY: NO ANXIETY, AT EASE
PULSE: 99
NAUSEA AND VOMITING: INTERMITTENT NAUSEA WITH DRY HEAVES
EVER FELT BAD OR GUILTY ABOUT YOUR DRINKING: NO
HAVE PEOPLE ANNOYED YOU BY CRITICIZING YOUR DRINKING: NO
TREMOR: NO TREMOR
HAVE YOU EVER FELT YOU SHOULD CUT DOWN ON YOUR DRINKING: NO
VISUAL DISTURBANCES: NOT PRESENT
TOTAL SCORE: 0
ORIENTATION AND CLOUDING OF SENSORIUM: ORIENTED AND CAN DO SERIAL ADDITIONS
EVER HAD A DRINK FIRST THING IN THE MORNING TO STEADY YOUR NERVES TO GET RID OF A HANGOVER: NO

## 2024-03-25 ASSESSMENT — COLUMBIA-SUICIDE SEVERITY RATING SCALE - C-SSRS
5. HAVE YOU STARTED TO WORK OUT OR WORKED OUT THE DETAILS OF HOW TO KILL YOURSELF? DO YOU INTEND TO CARRY OUT THIS PLAN?: NO
2. HAVE YOU ACTUALLY HAD ANY THOUGHTS OF KILLING YOURSELF?: NO
4. HAVE YOU HAD THESE THOUGHTS AND HAD SOME INTENTION OF ACTING ON THEM?: NO
6. HAVE YOU EVER DONE ANYTHING, STARTED TO DO ANYTHING, OR PREPARED TO DO ANYTHING TO END YOUR LIFE?: NO
1. IN THE PAST MONTH, HAVE YOU WISHED YOU WERE DEAD OR WISHED YOU COULD GO TO SLEEP AND NOT WAKE UP?: NO

## 2024-03-25 ASSESSMENT — PAIN SCALES - GENERAL: PAINLEVEL_OUTOF10: 0 - NO PAIN

## 2024-03-26 PROBLEM — R53.1 GENERALIZED WEAKNESS: Status: ACTIVE | Noted: 2024-03-26

## 2024-03-26 LAB
AMPHETAMINES UR QL SCN: NORMAL
ANION GAP SERPL CALC-SCNC: 22 MMOL/L (ref 10–20)
APPEARANCE UR: CLEAR
ATRIAL RATE: 88 BPM
BACTERIA #/AREA URNS AUTO: ABNORMAL /HPF
BARBITURATES UR QL SCN: NORMAL
BENZODIAZ UR QL SCN: NORMAL
BILIRUB UR STRIP.AUTO-MCNC: NEGATIVE MG/DL
BUN SERPL-MCNC: 24 MG/DL (ref 6–23)
BZE UR QL SCN: NORMAL
CALCIUM SERPL-MCNC: 9.1 MG/DL (ref 8.6–10.3)
CANNABINOIDS UR QL SCN: NORMAL
CHLORIDE SERPL-SCNC: 95 MMOL/L (ref 98–107)
CO2 SERPL-SCNC: 20 MMOL/L (ref 21–32)
COLOR UR: ABNORMAL
CREAT SERPL-MCNC: 0.6 MG/DL (ref 0.5–1.05)
EGFRCR SERPLBLD CKD-EPI 2021: >90 ML/MIN/1.73M*2
ERYTHROCYTE [DISTWIDTH] IN BLOOD BY AUTOMATED COUNT: 15.8 % (ref 11.5–14.5)
FENTANYL+NORFENTANYL UR QL SCN: NORMAL
FLUAV RNA RESP QL NAA+PROBE: NOT DETECTED
FLUBV RNA RESP QL NAA+PROBE: NOT DETECTED
GLUCOSE SERPL-MCNC: 79 MG/DL (ref 74–99)
GLUCOSE UR STRIP.AUTO-MCNC: NEGATIVE MG/DL
HCT VFR BLD AUTO: 24.5 % (ref 36–46)
HGB BLD-MCNC: 8.7 G/DL (ref 12–16)
HYALINE CASTS #/AREA URNS AUTO: ABNORMAL /LPF
KETONES UR STRIP.AUTO-MCNC: ABNORMAL MG/DL
LEUKOCYTE ESTERASE UR QL STRIP.AUTO: ABNORMAL
MCH RBC QN AUTO: 35.8 PG (ref 26–34)
MCHC RBC AUTO-ENTMCNC: 35.5 G/DL (ref 32–36)
MCV RBC AUTO: 101 FL (ref 80–100)
METHADONE UR QL SCN: NORMAL
NITRITE UR QL STRIP.AUTO: NEGATIVE
NRBC BLD-RTO: 0.3 /100 WBCS (ref 0–0)
OPIATES UR QL SCN: NORMAL
OXYCODONE+OXYMORPHONE UR QL SCN: NORMAL
P AXIS: 89 DEGREES
P OFFSET: 191 MS
P ONSET: 142 MS
PCP UR QL SCN: NORMAL
PH UR STRIP.AUTO: 5 [PH]
PLATELET # BLD AUTO: 372 X10*3/UL (ref 150–450)
POTASSIUM SERPL-SCNC: 3.3 MMOL/L (ref 3.5–5.3)
PR INTERVAL: 158 MS
PROT UR STRIP.AUTO-MCNC: ABNORMAL MG/DL
Q ONSET: 221 MS
QRS COUNT: 15 BEATS
QRS DURATION: 82 MS
QT INTERVAL: 376 MS
QTC CALCULATION(BAZETT): 454 MS
QTC FREDERICIA: 427 MS
R AXIS: 73 DEGREES
RBC # BLD AUTO: 2.43 X10*6/UL (ref 4–5.2)
RBC # UR STRIP.AUTO: NEGATIVE /UL
RBC #/AREA URNS AUTO: ABNORMAL /HPF
SARS-COV-2 RNA RESP QL NAA+PROBE: NOT DETECTED
SODIUM SERPL-SCNC: 134 MMOL/L (ref 136–145)
SP GR UR STRIP.AUTO: 1.02
T AXIS: 85 DEGREES
T OFFSET: 409 MS
UROBILINOGEN UR STRIP.AUTO-MCNC: 4 MG/DL
VENTRICULAR RATE: 88 BPM
WBC # BLD AUTO: 6.3 X10*3/UL (ref 4.4–11.3)
WBC #/AREA URNS AUTO: ABNORMAL /HPF

## 2024-03-26 PROCEDURE — 97161 PT EVAL LOW COMPLEX 20 MIN: CPT | Mod: GP

## 2024-03-26 PROCEDURE — 2500000002 HC RX 250 W HCPCS SELF ADMINISTERED DRUGS (ALT 637 FOR MEDICARE OP, ALT 636 FOR OP/ED): Performed by: STUDENT IN AN ORGANIZED HEALTH CARE EDUCATION/TRAINING PROGRAM

## 2024-03-26 PROCEDURE — 81001 URINALYSIS AUTO W/SCOPE: CPT | Performed by: PHYSICIAN ASSISTANT

## 2024-03-26 PROCEDURE — 85027 COMPLETE CBC AUTOMATED: CPT

## 2024-03-26 PROCEDURE — 80307 DRUG TEST PRSMV CHEM ANLYZR: CPT | Performed by: PHYSICIAN ASSISTANT

## 2024-03-26 PROCEDURE — 2500000004 HC RX 250 GENERAL PHARMACY W/ HCPCS (ALT 636 FOR OP/ED)

## 2024-03-26 PROCEDURE — 1100000001 HC PRIVATE ROOM DAILY

## 2024-03-26 PROCEDURE — 97165 OT EVAL LOW COMPLEX 30 MIN: CPT | Mod: GO

## 2024-03-26 PROCEDURE — 99222 1ST HOSP IP/OBS MODERATE 55: CPT

## 2024-03-26 PROCEDURE — 99232 SBSQ HOSP IP/OBS MODERATE 35: CPT | Performed by: STUDENT IN AN ORGANIZED HEALTH CARE EDUCATION/TRAINING PROGRAM

## 2024-03-26 PROCEDURE — 36415 COLL VENOUS BLD VENIPUNCTURE: CPT

## 2024-03-26 PROCEDURE — 87086 URINE CULTURE/COLONY COUNT: CPT | Mod: ELYLAB | Performed by: PHYSICIAN ASSISTANT

## 2024-03-26 PROCEDURE — 2500000004 HC RX 250 GENERAL PHARMACY W/ HCPCS (ALT 636 FOR OP/ED): Performed by: PHYSICIAN ASSISTANT

## 2024-03-26 PROCEDURE — 87636 SARSCOV2 & INF A&B AMP PRB: CPT | Performed by: PHYSICIAN ASSISTANT

## 2024-03-26 PROCEDURE — C9113 INJ PANTOPRAZOLE SODIUM, VIA: HCPCS

## 2024-03-26 PROCEDURE — 80048 BASIC METABOLIC PNL TOTAL CA: CPT

## 2024-03-26 RX ORDER — GABAPENTIN 300 MG/1
300 CAPSULE ORAL NIGHTLY
COMMUNITY

## 2024-03-26 RX ORDER — PANTOPRAZOLE SODIUM 40 MG/1
40 TABLET, DELAYED RELEASE ORAL DAILY
Status: DISCONTINUED | OUTPATIENT
Start: 2024-03-26 | End: 2024-03-30 | Stop reason: HOSPADM

## 2024-03-26 RX ORDER — ONDANSETRON HYDROCHLORIDE 2 MG/ML
4 INJECTION, SOLUTION INTRAVENOUS EVERY 8 HOURS PRN
Status: DISCONTINUED | OUTPATIENT
Start: 2024-03-26 | End: 2024-03-30 | Stop reason: HOSPADM

## 2024-03-26 RX ORDER — MAGNESIUM SULFATE HEPTAHYDRATE 40 MG/ML
2 INJECTION, SOLUTION INTRAVENOUS ONCE
Status: COMPLETED | OUTPATIENT
Start: 2024-03-26 | End: 2024-03-26

## 2024-03-26 RX ORDER — ENOXAPARIN SODIUM 100 MG/ML
40 INJECTION SUBCUTANEOUS EVERY 24 HOURS
Status: DISCONTINUED | OUTPATIENT
Start: 2024-03-26 | End: 2024-03-30 | Stop reason: HOSPADM

## 2024-03-26 RX ORDER — ACETAMINOPHEN 160 MG/5ML
650 SOLUTION ORAL EVERY 4 HOURS PRN
Status: DISCONTINUED | OUTPATIENT
Start: 2024-03-26 | End: 2024-03-28

## 2024-03-26 RX ORDER — PANTOPRAZOLE SODIUM 40 MG/10ML
40 INJECTION, POWDER, LYOPHILIZED, FOR SOLUTION INTRAVENOUS DAILY
Status: DISCONTINUED | OUTPATIENT
Start: 2024-03-26 | End: 2024-03-30 | Stop reason: HOSPADM

## 2024-03-26 RX ORDER — POLYETHYLENE GLYCOL 3350 17 G/17G
17 POWDER, FOR SOLUTION ORAL DAILY
Status: DISCONTINUED | OUTPATIENT
Start: 2024-03-26 | End: 2024-03-30 | Stop reason: HOSPADM

## 2024-03-26 RX ORDER — ONDANSETRON 4 MG/1
4 TABLET, ORALLY DISINTEGRATING ORAL EVERY 8 HOURS PRN
Status: DISCONTINUED | OUTPATIENT
Start: 2024-03-26 | End: 2024-03-30 | Stop reason: HOSPADM

## 2024-03-26 RX ORDER — ACETAMINOPHEN 650 MG/1
650 SUPPOSITORY RECTAL EVERY 4 HOURS PRN
Status: DISCONTINUED | OUTPATIENT
Start: 2024-03-26 | End: 2024-03-28

## 2024-03-26 RX ORDER — POTASSIUM CHLORIDE 20 MEQ/1
40 TABLET, EXTENDED RELEASE ORAL ONCE
Status: COMPLETED | OUTPATIENT
Start: 2024-03-26 | End: 2024-03-26

## 2024-03-26 RX ORDER — ACETAMINOPHEN 325 MG/1
650 TABLET ORAL EVERY 4 HOURS PRN
Status: DISCONTINUED | OUTPATIENT
Start: 2024-03-26 | End: 2024-03-28

## 2024-03-26 RX ORDER — SODIUM CHLORIDE 9 MG/ML
75 INJECTION, SOLUTION INTRAVENOUS CONTINUOUS
Status: DISCONTINUED | OUTPATIENT
Start: 2024-03-26 | End: 2024-03-27

## 2024-03-26 RX ADMIN — ACETAMINOPHEN 650 MG: 325 TABLET ORAL at 20:40

## 2024-03-26 RX ADMIN — ACETAMINOPHEN 650 MG: 325 TABLET ORAL at 15:17

## 2024-03-26 RX ADMIN — PANTOPRAZOLE SODIUM 40 MG: 40 INJECTION, POWDER, FOR SOLUTION INTRAVENOUS at 08:00

## 2024-03-26 RX ADMIN — POLYETHYLENE GLYCOL 3350 17 G: 17 POWDER, FOR SOLUTION ORAL at 09:00

## 2024-03-26 RX ADMIN — ENOXAPARIN SODIUM 40 MG: 100 INJECTION SUBCUTANEOUS at 08:00

## 2024-03-26 RX ADMIN — MAGNESIUM SULFATE HEPTAHYDRATE 2 G: 40 INJECTION, SOLUTION INTRAVENOUS at 01:45

## 2024-03-26 RX ADMIN — SODIUM CHLORIDE 75 ML/HR: 9 INJECTION, SOLUTION INTRAVENOUS at 04:00

## 2024-03-26 RX ADMIN — POTASSIUM CHLORIDE 40 MEQ: 1500 TABLET, EXTENDED RELEASE ORAL at 16:52

## 2024-03-26 RX ADMIN — SODIUM CHLORIDE 75 ML/HR: 9 INJECTION, SOLUTION INTRAVENOUS at 15:13

## 2024-03-26 SDOH — SOCIAL STABILITY: SOCIAL INSECURITY: ABUSE: ADULT

## 2024-03-26 SDOH — SOCIAL STABILITY: SOCIAL INSECURITY: HAVE YOU HAD THOUGHTS OF HARMING ANYONE ELSE?: NO

## 2024-03-26 SDOH — SOCIAL STABILITY: SOCIAL INSECURITY: HAS ANYONE EVER THREATENED TO HURT YOUR FAMILY OR YOUR PETS?: NO

## 2024-03-26 SDOH — SOCIAL STABILITY: SOCIAL INSECURITY: DOES ANYONE TRY TO KEEP YOU FROM HAVING/CONTACTING OTHER FRIENDS OR DOING THINGS OUTSIDE YOUR HOME?: NO

## 2024-03-26 SDOH — SOCIAL STABILITY: SOCIAL INSECURITY: WERE YOU ABLE TO COMPLETE ALL THE BEHAVIORAL HEALTH SCREENINGS?: YES

## 2024-03-26 SDOH — SOCIAL STABILITY: SOCIAL INSECURITY: DO YOU FEEL UNSAFE GOING BACK TO THE PLACE WHERE YOU ARE LIVING?: NO

## 2024-03-26 SDOH — SOCIAL STABILITY: SOCIAL INSECURITY: ARE THERE ANY APPARENT SIGNS OF INJURIES/BEHAVIORS THAT COULD BE RELATED TO ABUSE/NEGLECT?: NO

## 2024-03-26 SDOH — SOCIAL STABILITY: SOCIAL INSECURITY: ARE YOU OR HAVE YOU BEEN THREATENED OR ABUSED PHYSICALLY, EMOTIONALLY, OR SEXUALLY BY ANYONE?: NO

## 2024-03-26 SDOH — SOCIAL STABILITY: SOCIAL INSECURITY: DO YOU FEEL ANYONE HAS EXPLOITED OR TAKEN ADVANTAGE OF YOU FINANCIALLY OR OF YOUR PERSONAL PROPERTY?: NO

## 2024-03-26 ASSESSMENT — LIFESTYLE VARIABLES
TOTAL SCORE: 0
HEADACHE, FULLNESS IN HEAD: NOT PRESENT
AUDITORY DISTURBANCES: NOT PRESENT
AGITATION: NORMAL ACTIVITY
AGITATION: NORMAL ACTIVITY
HOW MANY STANDARD DRINKS CONTAINING ALCOHOL DO YOU HAVE ON A TYPICAL DAY: 3 OR 4
NAUSEA AND VOMITING: NO NAUSEA AND NO VOMITING
NAUSEA AND VOMITING: NO NAUSEA AND NO VOMITING
AUDITORY DISTURBANCES: NOT PRESENT
PAROXYSMAL SWEATS: NO SWEAT VISIBLE
PAROXYSMAL SWEATS: NO SWEAT VISIBLE
VISUAL DISTURBANCES: NOT PRESENT
ANXIETY: NO ANXIETY, AT EASE
PAROXYSMAL SWEATS: NO SWEAT VISIBLE
HOW OFTEN DO YOU HAVE A DRINK CONTAINING ALCOHOL: 4 OR MORE TIMES A WEEK
TOTAL SCORE: 0
NAUSEA AND VOMITING: NO NAUSEA AND NO VOMITING
ORIENTATION AND CLOUDING OF SENSORIUM: ORIENTED AND CAN DO SERIAL ADDITIONS
TOTAL SCORE: 0
ORIENTATION AND CLOUDING OF SENSORIUM: ORIENTED AND CAN DO SERIAL ADDITIONS
HEADACHE, FULLNESS IN HEAD: NOT PRESENT
ANXIETY: NO ANXIETY, AT EASE
NAUSEA AND VOMITING: NO NAUSEA AND NO VOMITING
AUDITORY DISTURBANCES: NOT PRESENT
PAROXYSMAL SWEATS: NO SWEAT VISIBLE
VISUAL DISTURBANCES: NOT PRESENT
AUDITORY DISTURBANCES: NOT PRESENT
PULSE: 86
HEADACHE, FULLNESS IN HEAD: NOT PRESENT
HEADACHE, FULLNESS IN HEAD: NOT PRESENT
AUDITORY DISTURBANCES: NOT PRESENT
ANXIETY: NO ANXIETY, AT EASE
VISUAL DISTURBANCES: NOT PRESENT
AGITATION: NORMAL ACTIVITY
ANXIETY: NO ANXIETY, AT EASE
VISUAL DISTURBANCES: NOT PRESENT
TREMOR: NO TREMOR
ANXIETY: NO ANXIETY, AT EASE
HEADACHE, FULLNESS IN HEAD: NOT PRESENT
TOTAL SCORE: 0
TOTAL SCORE: 0
ORIENTATION AND CLOUDING OF SENSORIUM: ORIENTED AND CAN DO SERIAL ADDITIONS
HOW OFTEN DO YOU HAVE 6 OR MORE DRINKS ON ONE OCCASION: WEEKLY
AUDITORY DISTURBANCES: NOT PRESENT
TREMOR: NO TREMOR
TOTAL SCORE: 0
AGITATION: NORMAL ACTIVITY
HEADACHE, FULLNESS IN HEAD: NOT PRESENT
PAROXYSMAL SWEATS: NO SWEAT VISIBLE
TREMOR: NO TREMOR
TOTAL SCORE: 2
HEADACHE, FULLNESS IN HEAD: NOT PRESENT
TREMOR: NO TREMOR
NAUSEA AND VOMITING: NO NAUSEA AND NO VOMITING
NAUSEA AND VOMITING: 2
AUDITORY DISTURBANCES: NOT PRESENT
ANXIETY: NO ANXIETY, AT EASE
TREMOR: NO TREMOR
HEADACHE, FULLNESS IN HEAD: NOT PRESENT
TOTAL SCORE: 0
NAUSEA AND VOMITING: NO NAUSEA AND NO VOMITING
AUDITORY DISTURBANCES: NOT PRESENT
TREMOR: NO TREMOR
TREMOR: NO TREMOR
VISUAL DISTURBANCES: NOT PRESENT
VISUAL DISTURBANCES: NOT PRESENT
HEADACHE, FULLNESS IN HEAD: NOT PRESENT
BLOOD PRESSURE: 106/64
ORIENTATION AND CLOUDING OF SENSORIUM: ORIENTED AND CAN DO SERIAL ADDITIONS
PAROXYSMAL SWEATS: NO SWEAT VISIBLE
TREMOR: NO TREMOR
AGITATION: NORMAL ACTIVITY
PULSE: 72
ANXIETY: NO ANXIETY, AT EASE
VISUAL DISTURBANCES: NOT PRESENT
AUDIT-C TOTAL SCORE: 8
ANXIETY: NO ANXIETY, AT EASE
AUDIT-C TOTAL SCORE: 8
VISUAL DISTURBANCES: NOT PRESENT
AUDITORY DISTURBANCES: NOT PRESENT
PAROXYSMAL SWEATS: NO SWEAT VISIBLE
VISUAL DISTURBANCES: NOT PRESENT
ANXIETY: NO ANXIETY, AT EASE
PULSE: 76
AGITATION: NORMAL ACTIVITY
AGITATION: NORMAL ACTIVITY
VISUAL DISTURBANCES: NOT PRESENT
AGITATION: NORMAL ACTIVITY
AUDITORY DISTURBANCES: NOT PRESENT
ORIENTATION AND CLOUDING OF SENSORIUM: ORIENTED AND CAN DO SERIAL ADDITIONS
TOTAL SCORE: 0
NAUSEA AND VOMITING: NO NAUSEA AND NO VOMITING
ORIENTATION AND CLOUDING OF SENSORIUM: ORIENTED AND CAN DO SERIAL ADDITIONS
NAUSEA AND VOMITING: NO NAUSEA AND NO VOMITING
PAROXYSMAL SWEATS: NO SWEAT VISIBLE
AGITATION: NORMAL ACTIVITY
AGITATION: NORMAL ACTIVITY
HEADACHE, FULLNESS IN HEAD: NOT PRESENT
TREMOR: NO TREMOR
PAROXYSMAL SWEATS: NO SWEAT VISIBLE
TREMOR: NO TREMOR
TREMOR: NO TREMOR
ORIENTATION AND CLOUDING OF SENSORIUM: ORIENTED AND CAN DO SERIAL ADDITIONS
ANXIETY: NO ANXIETY, AT EASE
VISUAL DISTURBANCES: NOT PRESENT
AUDITORY DISTURBANCES: NOT PRESENT
ORIENTATION AND CLOUDING OF SENSORIUM: ORIENTED AND CAN DO SERIAL ADDITIONS
HEADACHE, FULLNESS IN HEAD: NOT PRESENT
BLOOD PRESSURE: 102/60
TOTAL SCORE: 0
ORIENTATION AND CLOUDING OF SENSORIUM: ORIENTED AND CAN DO SERIAL ADDITIONS
NAUSEA AND VOMITING: NO NAUSEA AND NO VOMITING
PAROXYSMAL SWEATS: NO SWEAT VISIBLE
TOTAL SCORE: 0
ORIENTATION AND CLOUDING OF SENSORIUM: ORIENTED AND CAN DO SERIAL ADDITIONS
ORIENTATION AND CLOUDING OF SENSORIUM: ORIENTED AND CAN DO SERIAL ADDITIONS
AGITATION: NORMAL ACTIVITY
NAUSEA AND VOMITING: NO NAUSEA AND NO VOMITING
PAROXYSMAL SWEATS: NO SWEAT VISIBLE
ANXIETY: NO ANXIETY, AT EASE
SKIP TO QUESTIONS 9-10: 0

## 2024-03-26 ASSESSMENT — COGNITIVE AND FUNCTIONAL STATUS - GENERAL
MOVING TO AND FROM BED TO CHAIR: A LITTLE
MOBILITY SCORE: 16
MOVING FROM LYING ON BACK TO SITTING ON SIDE OF FLAT BED WITH BEDRAILS: A LITTLE
DRESSING REGULAR UPPER BODY CLOTHING: A LITTLE
PERSONAL GROOMING: A LITTLE
STANDING UP FROM CHAIR USING ARMS: A LOT
CLIMB 3 TO 5 STEPS WITH RAILING: TOTAL
TOILETING: A LOT
WALKING IN HOSPITAL ROOM: A LOT
DAILY ACTIVITIY SCORE: 15
EATING MEALS: A LITTLE
DRESSING REGULAR LOWER BODY CLOTHING: A LITTLE
DRESSING REGULAR LOWER BODY CLOTHING: A LOT
CLIMB 3 TO 5 STEPS WITH RAILING: A LOT
STANDING UP FROM CHAIR USING ARMS: A LITTLE
TURNING FROM BACK TO SIDE WHILE IN FLAT BAD: A LITTLE
PERSONAL GROOMING: A LITTLE
DRESSING REGULAR UPPER BODY CLOTHING: A LITTLE
TOILETING: A LOT
MOVING TO AND FROM BED TO CHAIR: A LITTLE
PATIENT BASELINE BEDBOUND: NO
HELP NEEDED FOR BATHING: A LITTLE
WALKING IN HOSPITAL ROOM: A LITTLE
TURNING FROM BACK TO SIDE WHILE IN FLAT BAD: A LOT
DAILY ACTIVITIY SCORE: 18
HELP NEEDED FOR BATHING: A LOT
MOVING FROM LYING ON BACK TO SITTING ON SIDE OF FLAT BED WITH BEDRAILS: A LITTLE

## 2024-03-26 ASSESSMENT — ACTIVITIES OF DAILY LIVING (ADL)
JUDGMENT_ADEQUATE_SAFELY_COMPLETE_DAILY_ACTIVITIES: NO
LACK_OF_TRANSPORTATION: YES
DRESSING YOURSELF: NEEDS ASSISTANCE
GROOMING: NEEDS ASSISTANCE
ADEQUATE_TO_COMPLETE_ADL: YES
HEARING - RIGHT EAR: FUNCTIONAL
WALKS IN HOME: NEEDS ASSISTANCE
TOILETING: NEEDS ASSISTANCE
BATHING_ASSISTANCE: MODERATE
BATHING: NEEDS ASSISTANCE
HEARING - LEFT EAR: FUNCTIONAL
ASSISTIVE_DEVICE: CANE;WALKER
FEEDING YOURSELF: INDEPENDENT
PATIENT'S MEMORY ADEQUATE TO SAFELY COMPLETE DAILY ACTIVITIES?: YES

## 2024-03-26 ASSESSMENT — ENCOUNTER SYMPTOMS
RESPIRATORY NEGATIVE: 1
WEAKNESS: 1
EYES NEGATIVE: 1
CONSTITUTIONAL NEGATIVE: 1
HEMATOLOGIC/LYMPHATIC NEGATIVE: 1
PSYCHIATRIC NEGATIVE: 1
CARDIOVASCULAR NEGATIVE: 1
GASTROINTESTINAL NEGATIVE: 1
ALLERGIC/IMMUNOLOGIC NEGATIVE: 1
ENDOCRINE NEGATIVE: 1

## 2024-03-26 ASSESSMENT — PATIENT HEALTH QUESTIONNAIRE - PHQ9
2. FEELING DOWN, DEPRESSED OR HOPELESS: NOT AT ALL
SUM OF ALL RESPONSES TO PHQ9 QUESTIONS 1 & 2: 0
1. LITTLE INTEREST OR PLEASURE IN DOING THINGS: NOT AT ALL

## 2024-03-26 ASSESSMENT — PAIN SCALES - GENERAL
PAINLEVEL_OUTOF10: 0 - NO PAIN
PAINLEVEL_OUTOF10: 2
PAINLEVEL_OUTOF10: 0 - NO PAIN

## 2024-03-26 ASSESSMENT — PAIN DESCRIPTION - LOCATION: LOCATION: HEAD

## 2024-03-26 ASSESSMENT — PAIN - FUNCTIONAL ASSESSMENT
PAIN_FUNCTIONAL_ASSESSMENT: 0-10

## 2024-03-26 NOTE — PROGRESS NOTES
Medical Group Progress Note  ASSESSMENT & PLAN:     Generalized weakness  Frequent falls  - PT/OT  - Weakness is multifactorial, likely in setting of deconditioning along with age as well as social situation  - Will need placement on discharge  - Workup unremarkable for acute findings    Essential hypertension  Peripheral neuropathy  COPD, not in exacerbation  - Per nursing staff spoke with pharmacy patient has not refilled any recent medications  - Will attempt to reach PCPs office tomorrow 3/27 when they are open    Hypokalemia  - Replace as needed    Anemia of chronic disease  - Hemoglobin 8.7 today (baseline 10)  - No signs of acute bleeding  - Transfuse if hemoglobin <7.0    VTE prophylaxis: Enoxaparin subcutaneous    ---Of note, this documentation is completed using the Dragon Dictation system (voice recognition software). There may be spelling and/or grammatical errors that were not corrected prior to final submission.---    Marshal Banks MD    SUBJECTIVE     Patient was seen and examined at bedside in the ED this morning.  Did tell me that she had been evicted from her  situation 3 weeks ago has been having frequent falls and worsening weakness.  Her legs do feel caregiving out underneath her when she falls.  Even while using an ambulatory device she feels that she keeps falling.  Had no other complaints to me otherwise.    OBJECTIVE:     Last Recorded Vitals:  Vitals:    03/26/24 1136 03/26/24 1244 03/26/24 1321 03/26/24 1500   BP: 94/60 102/64 113/67 102/60   BP Location: Left arm Left arm     Patient Position: Lying Lying     Pulse: 72 71 86 76   Resp: 16 16     Temp:   36.9 °C (98.4 °F)    TempSrc:       SpO2: 100% 100% 95%    Weight:       Height:         Last I/O:  No intake/output data recorded.    Physical Exam  Vitals reviewed.   Constitutional:       General: She is not in acute distress.     Appearance: Normal appearance. She is not toxic-appearing.   HENT:      Head: Normocephalic  and atraumatic.   Eyes:      Extraocular Movements: Extraocular movements intact.      Conjunctiva/sclera: Conjunctivae normal.   Cardiovascular:      Rate and Rhythm: Normal rate and regular rhythm.      Pulses: Normal pulses.      Heart sounds: Normal heart sounds.   Pulmonary:      Effort: Pulmonary effort is normal. No respiratory distress.      Breath sounds: Normal breath sounds. No wheezing.   Abdominal:      General: Bowel sounds are normal.      Palpations: Abdomen is soft.      Tenderness: There is no abdominal tenderness. There is no guarding.   Musculoskeletal:         General: Normal range of motion.      Cervical back: Normal range of motion and neck supple.   Neurological:      General: No focal deficit present.      Mental Status: She is alert and oriented to person, place, and time. Mental status is at baseline.     Inpatient Medications:  enoxaparin, 40 mg, subcutaneous, q24h  pantoprazole, 40 mg, oral, Daily   Or  pantoprazole, 40 mg, intravenous, Daily  polyethylene glycol, 17 g, oral, Daily    PRN Medications  PRN medications: acetaminophen **OR** acetaminophen **OR** acetaminophen, LORazepam **OR** LORazepam **OR** LORazepam, ondansetron ODT **OR** ondansetron  Continuous Medications:  sodium chloride 0.9%, 75 mL/hr, Last Rate: 75 mL/hr (03/26/24 1513)      LABS AND IMAGING:     Labs:  Results from last 7 days   Lab Units 03/26/24  0629 03/25/24  2136   WBC AUTO x10*3/uL 6.3 5.6   RBC AUTO x10*6/uL 2.43* 2.94*   HEMOGLOBIN g/dL 8.7* 10.5*   HEMATOCRIT % 24.5* 29.8*   MCV fL 101* 101*   MCH pg 35.8* 35.7*   MCHC g/dL 35.5 35.2   RDW % 15.8* 15.9*   PLATELETS AUTO x10*3/uL 372 422     Results from last 7 days   Lab Units 03/26/24  0629 03/25/24  2136   SODIUM mmol/L 134* 133*   POTASSIUM mmol/L 3.3* 4.0   CHLORIDE mmol/L 95* 90*   CO2 mmol/L 20* 20*   BUN mg/dL 24* 25*   CREATININE mg/dL 0.60 0.79   GLUCOSE mg/dL 79 89   PROTEIN TOTAL g/dL  --  7.8   CALCIUM mg/dL 9.1 10.4*   BILIRUBIN TOTAL  mg/dL  --  0.6   ALK PHOS U/L  --  87   AST U/L  --  45*   ALT U/L  --  21     Results from last 7 days   Lab Units 03/25/24  2136   MAGNESIUM mg/dL 1.59*     Results from last 7 days   Lab Units 03/25/24  2237 03/25/24  2136   TROPHS ng/L 4 4     Imaging:  ECG 12 lead  Normal sinus rhythm  Nonspecific ST abnormality  Abnormal ECG  When compared with ECG of 15-AUG-2023 14:10,  No significant change was found  See ED provider note for full interpretation and clinical correlation  Confirmed by Cyndi Terry ([id number]) on 3/26/2024 1:00:27 PM  CT head wo IV contrast, CT cervical spine wo IV contrast, CT thoracic spine wo IV contrast, CT lumbar spine wo IV contrast  Narrative: Interpreted By:  Brian Cardoza,   STUDY:  CT HEAD WO IV CONTRAST; CT CERVICAL SPINE WO IV CONTRAST;  3/25/2024  9:52 pm      INDICATION:  Signs/Symptoms:frequent falls; Signs/Symptoms:freq falls      COMPARISON:  02/02/2021      ACCESSION NUMBER(S):  [id number]; [id number]      ORDERING CLINICIAN:  VALENTE VICK      TECHNIQUE:  Axial noncontrast CT images of head with coronal and sagittal  reconstructed images. Axial noncontrast CT images of the cervical  lumbar and thoracic spine with coronal and sagittal reconstructed  images.      FINDINGS:  Demineralization of the bones      CT  Global volume loss and chronic small vessel ischemic change. The  appearance is similar. No hemorrhage or edema. No mass effect or  midline shif. T paranasal sinuses and mastoids are clear.      CT CERVICAL SPINE:  No findings of acute fracture. Prevertebral soft tissues within  normal limits. No significant central canal or foraminal stenosis.  Normal facet alignment. Mild apical scarring seen on the right.          CT thoracic spine: Scoliosis. No central canal or foraminal stenosis.  Changes of old healed granulomas disease. No fracture no high-grade  central canal or foraminal stenosis      Lumbar spine: No evidence of acute lumbar spine fracture.  Mild  multilevel degenerative disc disease.          Impression: CT HEAD:  No acute intracranial abnormality or calvarial fracture.          CT CERVICAL SPINE:  No acute fracture or traumatic malalignment of the cervical spine.  No findings of acute lumbar spine or thoracic spine fracture. Mild  degenerative changes.      Demineralization of the bones. Changes of old healed granulomas  disease      Signed by: Brian Cardoza 3/25/2024 10:38 PM  Dictation workstation:   [id number]

## 2024-03-26 NOTE — PROGRESS NOTES
Occupational Therapy    Evaluation    Patient Name: Jolanta Castaneda  MRN: [id number]  Today's Date: 3/26/2024  Time Calculation  Start Time: 1149  Stop Time: 1202  Time Calculation (min): 13 min      Assessment:  OT Assessment: Limited by decreased strength, impaired endurance and balance deficits.  End of Session Communication: Bedside nurse  End of Session Patient Position: Alarm off, not on at start of session (ED cart.)  OT Assessment Results: Decreased ADL status, Decreased endurance, Decreased functional mobility  Plan:  Treatment Interventions: ADL retraining, Functional transfer training, UE strengthening/ROM, Endurance training, Patient/family training  OT Frequency: 2 times per week  OT Discharge Recommendations: Moderate intensity level of continued care  OT - OK to Discharge: Yes (Once medically appropriate.)  Treatment Interventions: ADL retraining, Functional transfer training, UE strengthening/ROM, Endurance training, Patient/family training    Subjective   Current Problem:  1. Generalized weakness        2. Frequent falls        3. Weight loss        4. Nausea vomiting and diarrhea        5. Hypomagnesemia        6. Generalized abdominal pain          General:  General  Reason for Referral: ADLs  Referred By: Beth MCDANIEL  Past Medical History Relevant to Rehab: COPH, HTN, Neuropathy, OA, Hyperthyroidism, PAD  Prior to Session Communication: Bedside nurse (Cleared for therapy evaluation by RN.)  Patient Position Received: Bed, 2 rail up (ED cart.)  General Comment: Patient presented with weakness and falls. CXR: (-). X ray pelvis: (-). CT head: (-). CT C spine: (-). CT L spine: (-).  Precautions:  Medical Precautions: Fall precautions     Pain:  Pain Assessment  Pain Assessment: 0-10  Pain Score: 0 - No pain    Objective   Cognition:  Overall Cognitive Status: Within Functional Limits  Orientation Level: Oriented X4           Home Living:  Home Living Comments: Patient reports she has been staying at a  [facility] for ~1 month.  Prior Function:  Prior Function Comments: Patient reports completing functional mobility at a mod I level with a cane and walker. Independent with ADLs and IADLs. Reports multiple falls. Patient does not drive.     ADL:  Eating Assistance:  (Setup/S)  Grooming Assistance: Minimal  Bathing Assistance: Moderate  UE Dressing Assistance:  (Setup/S)  LE Dressing Assistance: Maximal  Toileting Assistance with Device: Moderate  Activity Tolerance:  Endurance: Decreased tolerance for upright activites  Bed Mobility/Transfers: Bed Mobility 1  Bed Mobility 1: Supine to sitting, Sitting to supine  Bed Mobility Comments 1: HOB elevated. Min A level.    Transfer 1  Technique 1: Sit to stand, Stand to sit  Transfer Device 1:  (FWW)  Trials/Comments 1: Patient completed sit <> stand from the ED cot and able to take a few side steps with a FWW at min A level. Cues for safe hand placement/walker management, limited by lines.     Standing Balance:  Dynamic Standing Balance  Dynamic Standing-Comments: Fair   Sensation:  Sensation Comment: Denies paresthesia in UEs.  Strength:  Strength Comments: B/L UE MMT WFL during functional tasks.     Extremities: RUE   RUE : Within Functional Limits and LUE   LUE: Within Functional Limits    Outcome Measures:[facility] Daily Activity  Putting on and taking off regular lower body clothing: A lot  Bathing (including washing, rinsing, drying): A lot  Putting on and taking off regular upper body clothing: A little  Toileting, which includes using toilet, bedpan or urinal: A lot  Taking care of personal grooming such as brushing teeth: A little  Eating Meals: A little  Daily Activity - Total Score: 15    Education Documentation  ADL Training, taught by Anita Ring OT at 3/26/2024  3:13 PM.  Learner: Patient  Readiness: Acceptance  Method: Explanation  Response: Needs Reinforcement    EDUCATION:  Education  Individual(s) Educated: Patient  Education Provided: POC discussed and agreed  upon, Risk and benefits of OT discussed with patient or other, Fall precautons  Patient Response to Education: Patient/Caregiver Verbalized Understanding of Information    Goals:  Encounter Problems       Encounter Problems (Active)       OT Goals       Patient will complete household distance functional mobility with a FWW at SBA level.  (Progressing)       Start:  03/26/24    Expected End:  04/09/24            Patient will complete functional transfers with a FWW at SBA level.  (Progressing)       Start:  03/26/24    Expected End:  04/09/24            Patient will demonstrate fair + dynamic standing balance during functional tasks. (Progressing)       Start:  03/26/24    Expected End:  04/09/24            Patient will complete lower body dressing at a SBA level.  (Progressing)       Start:  03/26/24    Expected End:  04/09/24            Patient will tolerate standing greater than 4 minutes during functional tasks. (Progressing)       Start:  03/26/24    Expected End:  04/09/24

## 2024-03-26 NOTE — NURSING NOTE
Called [facility] pharmacy in [place] where patient stated she filled her prescriptions. Per the pharmacist patient has not filled any prescriptions since January 2024. Secure chat sent to Dr Banks and updated him on information.

## 2024-03-26 NOTE — ED PROVIDER NOTES
HPI   Chief Complaint   Patient presents with    Weakness, Gen     Pt has been week and vomiting for the last 2-3 days.       The patient is a 59-year-old female who presents from home with family members and friends with report of increased weakness, frequent falls, unexplained weight loss with nausea vomiting or diarrhea.  The patient states for the past 3 days she has been having increased weakness, dizziness, frequent falls with closed head injury, nausea vomiting diarrhea and feeling dehydrated.  A close friend who is also her caregiver states that for the past week she has noticed that the patient seems to be losing weight, seems more emaciated and weaker.  The patient states that she has been unable to tolerate food or fluids for the past couple days and feels dehydrated.  She denies any chest pain, palpitations, cough or shortness of breath.  She did hit her head 3 days ago but does not recall passing out.  She did not seek medical attention or call 911.  She complains of a mild headache as well as diffuse body aches with chills.  The patient does have a history of ataxia, chronic constipation, COPD, dizziness, gastric ulcer, hypertension, hypothyroidism, malnutrition, chronic myopathy and neuropathy, nicotine dependence, peripheral arterial occlusive disease, peripheral neuropathy, osteoarthritis of both ankles and knees and multiple joints, frequent falls, uterine leiomyoma.  Patient does have a known history of alcohol abuse and according to the friend drinks frequently.      History provided by:  Patient, caregiver, friend and medical records                      [place] Coma Scale Score: 15                     Patient History   No past medical history on file.  Past Surgical History:   Procedure Laterality Date    OTHER SURGICAL HISTORY  10/27/2020    Hysterectomy    OTHER SURGICAL HISTORY  10/27/2020    Appendectomy    OTHER SURGICAL HISTORY  10/27/2020    Gallbladder surgery    OTHER SURGICAL HISTORY   10/27/2020    Glossectomy     Family History   Problem Relation Name Age of Onset    Coronary artery disease Mother      Other (ruptured cerebral aneurysm) Mother      Other (ruptured cerebral aneurysm) Father      Coronary artery disease Father      Alzheimer's disease Maternal Grandmother       Social History     Tobacco Use    Smoking status: Not on file    Smokeless tobacco: Not on file   Substance Use Topics    Alcohol use: Not on file    Drug use: Not on file       Physical Exam   ED Triage Vitals [03/25/24 1959]   Temperature Heart Rate Respirations BP   36.9 °C (98.4 °F) 100 18 122/79      Pulse Ox Temp Source Heart Rate Source Patient Position   99 % Temporal Monitor Standing      BP Location FiO2 (%)     Left arm --       Physical Exam  Vitals and nursing note reviewed. Exam conducted with a chaperone present.   Constitutional:       General: She is awake. She is not in acute distress.     Appearance: Normal appearance. She is underweight. She is ill-appearing. She is not toxic-appearing or diaphoretic.   HENT:      Head: Normocephalic and atraumatic.      Jaw: There is normal jaw occlusion.      Right Ear: Hearing, tympanic membrane, ear canal and external ear normal.      Left Ear: Hearing, tympanic membrane, ear canal and external ear normal.      Nose: Nose normal. No congestion or rhinorrhea.      Mouth/Throat:      Lips: Pink.      Mouth: Mucous membranes are dry.      Pharynx: Oropharynx is clear. Uvula midline.   Eyes:      General: Lids are normal. Vision grossly intact. Gaze aligned appropriately. No scleral icterus.     Extraocular Movements: Extraocular movements intact.      Conjunctiva/sclera: Conjunctivae normal.      Pupils: Pupils are equal, round, and reactive to light.   Cardiovascular:      Rate and Rhythm: Regular rhythm. Tachycardia present.      Pulses: Normal pulses.      Heart sounds: Normal heart sounds.   Pulmonary:      Effort: Pulmonary effort is normal.      Breath sounds:  Examination of the right-upper field reveals decreased breath sounds. Examination of the left-upper field reveals decreased breath sounds. Examination of the right-middle field reveals decreased breath sounds. Examination of the left-middle field reveals decreased breath sounds. Examination of the right-lower field reveals decreased breath sounds. Examination of the left-lower field reveals decreased breath sounds. Decreased breath sounds present.   Chest:      Chest wall: No tenderness.   Abdominal:      General: Bowel sounds are normal.      Palpations: Abdomen is soft.      Tenderness: There is no abdominal tenderness. There is no guarding or rebound.   Musculoskeletal:         General: No swelling, tenderness or deformity.      Cervical back: Normal range of motion and neck supple. No rigidity or tenderness.   Lymphadenopathy:      Cervical: No cervical adenopathy.   Skin:     General: Skin is warm and dry.      Capillary Refill: Capillary refill takes less than 2 seconds.   Neurological:      General: No focal deficit present.      Mental Status: She is alert and oriented to person, place, and time. Mental status is at baseline.      Sensory: Sensory deficit present.      Comments: The patient has chronic neuropathy to her lower extremities.   Psychiatric:         Mood and Affect: Mood normal.         Behavior: Behavior normal. Behavior is cooperative.         Thought Content: Thought content normal.         Judgment: Judgment normal.         ED Course & MDM   Diagnoses as of 03/26/24 0033   Generalized weakness   Frequent falls   Weight loss   Nausea vomiting and diarrhea   Hypomagnesemia   Generalized abdominal pain       Medical Decision Making  ED course: Initial vital signs temperature is 36.9, heart rate 100, respirations 18, /79 pulse ox is 99% on room air patient's weight is 52.2 kg  EKG interpreted by me at 2246 hrs. normal sinus rhythm nonspecific ST normality rate 88  QRS was 82 QT was  376 QTc was 454 no ischemic changes.  I discussed the workup plan with the patient, family members and friends.  The patient agrees with the plan.  Lab results were reviewed, CBC shows a white count 5.6 hemoglobin 10.5 Hudson crit 29.8, platelet count was 422, metabolic showed a sodium 133 chloride 90 bicarb 20 anion gap of 27 BUN 25 calcium 10.4.  Magnesium 1.59, first 2 troponins were both negative at 4, acute toxicology panel was unremarkable, lipase 39 ammonia 42, hepatic function shows AST of 45 otherwise remarkable thyroid 1.98  CT scan of the head was unremarkable for any acute intracranial abnormality, CT scan of cervical spine shows no acute fracture or subluxation, CT scan of thoracic spine shows scoliosis but no fracture, lumbar spine CT scan shows no signs of acute fracture or subluxation there is mild multilevel degenerative disc disease.  X-ray of the pelvis was unremarkable no acute fracture, chest x-ray shows no evidence of any acute infiltrate.  I rounded on the patient discussed results of the workup.  Repeat vital signs BP is 106/64 heart rate 86 respirations 18 pulse ox is 97% on room air I recommend admission given the patient's frequent falls, unexplained weight loss, malnutrition, generalized weakness and decreased ambulation status.  The patient agrees to be admitted.  I spoke with the hospital service and the patient was accepted to Dr. Bob        Procedure  Procedures     Miguel Peter PA-C  03/26/24 0034       Miguel Peter PA-C  03/26/24 0037

## 2024-03-26 NOTE — PROGRESS NOTES
Physical Therapy    Physical Therapy Evaluation    Patient Name: Jolanta Castaneda  MRN: [id number]  Today's Date: 3/26/2024   Time Calculation  Start Time: 1147  Stop Time: 1202  Time Calculation (min): 15 min    Assessment/Plan   PT Assessment  PT Assessment Results: Decreased strength, Decreased endurance, Impaired balance, Decreased mobility, Decreased coordination  Rehab Prognosis: Fair  Evaluation/Treatment Tolerance: Patient limited by fatigue  End of Session Communication: Bedside nurse  Assessment Comment: pt with decreased mobility , gait strength  balance and endurance  pt to benefit from skilled PT to address deficits and improve functional mobility .  End of Session Patient Position:  (ED Bed no AD rails up.)  IP OR SWING BED PT PLAN  Inpatient or Swing Bed: Inpatient  PT Plan  Treatment/Interventions: Bed mobility, Transfer training, Gait training, Balance training, Stair training, Strengthening, Endurance training, Therapeutic exercise, Therapeutic activity  PT Plan: Skilled PT  PT Frequency: 3 times per week  PT Discharge Recommendations: Low intensity level of continued care, Moderate intensity level of continued care  PT Recommended Transfer Status: Assist x1, Assistive device  PT - OK to Discharge: Yes (once medically ready for discharge to next level of care.)    Subjective     Current Problem:  Patient Active Problem List   Diagnosis    Ataxia, unspecified    Chronic constipation    COPD (chronic obstructive pulmonary disease) (CMS/[facility])    Dizziness    Gastric ulcer    Hypertension    Hyperthyroidism    Low blood potassium    Malnutrition, calorie (CMS/[facility])    Moderate protein-calorie malnutrition (weight for age 60-74% of standard) (CMS/[facility])    Myopathy, unspecified    Nicotine dependence    Orthostatic hypotension    Peripheral arterial occlusive disease (CMS/HCC)    Peripheral neuropathy    Primary localized osteoarthritis of both ankles    Primary localized osteoarthritis of knees, bilateral     Primary osteoarthritis involving multiple joints    Repeated falls    Tobacco use disorder    Unintended weight loss    Uterine leiomyoma    Generalized weakness       General Visit Information:  General  Reason for Referral: weakness/ impaired mobility  Referred By: OT/PT  Randal 3/26  Past Medical History Relevant to Rehab: COPD. HTN,  Nueropathy , OA, hyperthyroidism, PAD.  Prior to Session Communication: Bedside nurse (cleared to see for therapy eval .)  Patient Position Received:  (ER Bed no alarm.)  General Comment: pt is a 60 yo female came to the ED with weakness and falls.   test. labs  :  hgb 8.7 , CXR neg, Xray plevis neg, CT head neg,  CT C spine neg , CT T spine neg,  CT  L spine neg.    Home Living:  Home Living  Home Living Comments: per pt she has currently been living in a hotel . all  one level no steps.  tubshower.    Prior Level of Function:  Prior Function Per Pt/Caregiver Report  Prior Function Comments: per pt MOD I with mobility with SPC and walker.  mod I adls and iadls.  5 + falls  doesnt drive .    Precautions:  Precautions  Medical Precautions: Fall precautions    Objective     Pain:  Pain Assessment  Pain Assessment: 0-10  Pain Score: 0 - No pain    Cognition:  Cognition  Overall Cognitive Status: Within Functional Limits    General Assessments:      Activity Tolerance  Endurance: Decreased tolerance for upright activites     Strength  Strength Comments: BLE 4-/5    Dynamic Sitting Balance  Dynamic Sitting-Comments: fair  Dynamic Standing Balance  Dynamic Standing-Comments: fair -    Functional Assessments:     Bed Mobility  Bed Mobility: Yes  Bed Mobility 1  Bed Mobility 1: Supine to sitting, Sitting to supine, Scooting  Level of Assistance 1: Minimum assistance  Bed Mobility Comments 1: MIN A to EOB with increased time to complete  Transfers  Transfer: Yes  Transfer 1  Technique 1: Sit to stand, Stand to sit  Transfer Device 1: Walker (FWW)  Transfer Level of Assistance 1: Minimum  assistance  Trials/Comments 1: cues to push up to stand.  Ambulation/Gait Training  Ambulation/Gait Training Performed: Yes  Ambulation/Gait Training 1  Surface 1: Level tile  Device 1: Rolling walker  Assistance 1: Minimum assistance  Quality of Gait 1: Inconsistent stride length, Decreased step length  Comments/Distance (ft) 1: 5 steps with FWW with min A to head of bed .    Extremity/Trunk Assessments:    RLE   RLE : Within Functional Limits  LLE   LLE : Within Functional Limits    Outcome Measures:  [facility] Basic Mobility  Turning from your back to your side while in a flat bed without using bedrails: A little  Moving from lying on your back to sitting on the side of a flat bed without using bedrails: A little  Moving to and from bed to chair (including a wheelchair): A little  Standing up from a chair using your arms (e.g. wheelchair or bedside chair): A little  To walk in hospital room: A little  Climbing 3-5 steps with railing: Total  Basic Mobility - Total Score: 16    Goals:  Encounter Problems       Encounter Problems (Active)       PT Problem       Pt will demonstrate mod I  with bed mobility to edge of bed.         Start:  03/26/24    Expected End:  04/09/24            Pt will demonstrate mod I  with sit to stand/chair transfers with FWW.         Start:  03/26/24    Expected End:  04/09/24            Pt will ambulate 50 feet with FWW SUP.         Start:  03/26/24    Expected End:  04/09/24            Pt to demo improved BLE strength by being able to complete supine/seated thera ex 2x20 BLEs with 4 or less rest breaks .         Start:  03/26/24    Expected End:  04/09/24                 Education Documentation  Mobility Training, taught by Amor Chin, PT at 3/26/2024  1:30 PM.  Learner: Patient  Readiness: Acceptance  Method: Explanation  Response: Verbalizes Understanding    Education Comments  No comments found.

## 2024-03-26 NOTE — CARE PLAN
Problem: Skin  Goal: Decreased wound size/increased tissue granulation at next dressing change  Outcome: Progressing  Goal: Participates in plan/prevention/treatment measures  Outcome: Progressing  Goal: Prevent/manage excess moisture  Outcome: Progressing  Goal: Prevent/minimize sheer/friction injuries  Outcome: Progressing  Goal: Promote/optimize nutrition  Outcome: Progressing  Goal: Promote skin healing  Outcome: Progressing     Problem: Pain - Adult  Goal: Verbalizes/displays adequate comfort level or baseline comfort level  Outcome: Progressing     Problem: Safety - Adult  Goal: Free from fall injury  Outcome: Progressing     Problem: Discharge Planning  Goal: Discharge to home or other facility with appropriate resources  Outcome: Progressing     Problem: Chronic Conditions and Co-morbidities  Goal: Patient's chronic conditions and co-morbidity symptoms are monitored and maintained or improved  Outcome: Progressing

## 2024-03-26 NOTE — H&P
History Of Present Illness  Jolanta Castaneda is a 59 y.o. female with past medical history hypertension, COPD, hyperthyroidism, malnutrition, peripheral neuropathy, gastric ulcer presented to the ER with increased weakness, frequent falls, unexplained weight with nausea and vomiting. She reports hitting her head 3 days ago, but denies loss of consciousness. Patient has a known history of alcohol abuse. The patient states she has been unable to tolerate food or fluids for the past couple days. Patient denies chest pain, shortness of breath, fever, chills, diarrhea, and headaches.    ER course: vital signs temp 36.9, HR 86, RR 18, /64, SPO2 97%. Lab work revealed sodium 133, BUN 25, Cr 0.79, magnesium 1.59. CT head, cspine, lspine, tspine are negative for acute process. On examination, she is generally weak, alert and oriented. Patient will be admitted to the hospital for further workup.     Past Medical History  She has no past medical history on file.    Surgical History  She has a past surgical history that includes Other surgical history (10/27/2020); Other surgical history (10/27/2020); Other surgical history (10/27/2020); and Other surgical history (10/27/2020).     Social History  She reports current alcohol use. Drug use questions deferred to the physician. No history on file for tobacco use.    Family History  Family History   Problem Relation Name Age of Onset    Coronary artery disease Mother      Other (ruptured cerebral aneurysm) Mother      Other (ruptured cerebral aneurysm) Father      Coronary artery disease Father      Alzheimer's disease Maternal Grandmother          Allergies  Patient has no known allergies.    Review of Systems   Constitutional: Negative.    HENT: Negative.     Eyes: Negative.    Respiratory: Negative.     Cardiovascular: Negative.    Gastrointestinal: Negative.    Endocrine: Negative.    Genitourinary: Negative.    Musculoskeletal:  Positive for gait problem.   Skin: Negative.     Allergic/Immunologic: Negative.    Neurological:  Positive for weakness.   Hematological: Negative.    Psychiatric/Behavioral: Negative.          Physical Exam  Constitutional:       General: She is not in acute distress.     Appearance: She is ill-appearing.   HENT:      Mouth/Throat:      Mouth: Mucous membranes are dry.   Eyes:      Pupils: Pupils are equal, round, and reactive to light.   Cardiovascular:      Rate and Rhythm: Normal rate and regular rhythm.      Pulses: Normal pulses.      Heart sounds: Normal heart sounds.   Pulmonary:      Effort: Pulmonary effort is normal.      Breath sounds: Normal breath sounds.   Abdominal:      General: Abdomen is flat. Bowel sounds are normal. There is no distension.      Palpations: Abdomen is soft.      Tenderness: There is no abdominal tenderness.   Musculoskeletal:         General: Normal range of motion.   Skin:     General: Skin is warm and dry.      Capillary Refill: Capillary refill takes less than 2 seconds.   Neurological:      Mental Status: She is alert and oriented to person, place, and time.      GCS: GCS eye subscore is 4. GCS verbal subscore is 5. GCS motor subscore is 6.      Motor: Weakness present.   Psychiatric:         Mood and Affect: Mood normal.          Last Recorded Vitals  /64 (BP Location: Left arm, Patient Position: Lying)   Pulse 86   Temp 36.9 °C (98.4 °F) (Temporal)   Resp 18   Wt 52.2 kg (115 lb)   SpO2 97%     Relevant Results  CBC:   Results from last 7 days   Lab Units 03/25/24  2136   WBC AUTO x10*3/uL 5.6   RBC AUTO x10*6/uL 2.94*   HEMOGLOBIN g/dL 10.5*   HEMATOCRIT % 29.8*   MCV fL 101*   MCH pg 35.7*   MCHC g/dL 35.2   RDW % 15.9*   PLATELETS AUTO x10*3/uL 422     CMP:    Results from last 7 days   Lab Units 03/25/24  2136   SODIUM mmol/L 133*   POTASSIUM mmol/L 4.0   CHLORIDE mmol/L 90*   CO2 mmol/L 20*   BUN mg/dL 25*   CREATININE mg/dL 0.79   GLUCOSE mg/dL 89   PROTEIN TOTAL g/dL 7.8   CALCIUM mg/dL 10.4*    BILIRUBIN TOTAL mg/dL 0.6   ALK PHOS U/L 87   AST U/L 45*   ALT U/L 21     BMP:    Results from last 7 days   Lab Units 03/25/24 2136   SODIUM mmol/L 133*   POTASSIUM mmol/L 4.0   CHLORIDE mmol/L 90*   CO2 mmol/L 20*   BUN mg/dL 25*   CREATININE mg/dL 0.79   CALCIUM mg/dL 10.4*   GLUCOSE mg/dL 89     Magnesium:  Results from last 7 days   Lab Units 03/25/24  2136   MAGNESIUM mg/dL 1.59*     Troponin:    Results from last 7 days   Lab Units 03/25/24 2237 03/25/24  2136   TROPHS ng/L 4 4     Imagining  CT head wo IV contrast, CT cervical spine wo IV contrast, CT thoracic spine wo IV contrast, CT lumbar spine wo IV contrast  Narrative: Interpreted By:  Brian Cardoza,   STUDY:  CT HEAD WO IV CONTRAST; CT CERVICAL SPINE WO IV CONTRAST;  3/25/2024  9:52 pm      INDICATION:  Signs/Symptoms:frequent falls; Signs/Symptoms:freq falls      COMPARISON:  02/02/2021      ACCESSION NUMBER(S):  [id number]; [id number]      ORDERING CLINICIAN:  VALENTE VICK      TECHNIQUE:  Axial noncontrast CT images of head with coronal and sagittal  reconstructed images. Axial noncontrast CT images of the cervical  lumbar and thoracic spine with coronal and sagittal reconstructed  images.      FINDINGS:  Demineralization of the bones      CT  Global volume loss and chronic small vessel ischemic change. The  appearance is similar. No hemorrhage or edema. No mass effect or  midline shif. T paranasal sinuses and mastoids are clear.      CT CERVICAL SPINE:  No findings of acute fracture. Prevertebral soft tissues within  normal limits. No significant central canal or foraminal stenosis.  Normal facet alignment. Mild apical scarring seen on the right.          CT thoracic spine: Scoliosis. No central canal or foraminal stenosis.  Changes of old healed granulomas disease. No fracture no high-grade  central canal or foraminal stenosis      Lumbar spine: No evidence of acute lumbar spine fracture. Mild  multilevel degenerative disc disease.           Impression: CT HEAD:  No acute intracranial abnormality or calvarial fracture.          CT CERVICAL SPINE:  No acute fracture or traumatic malalignment of the cervical spine.  No findings of acute lumbar spine or thoracic spine fracture. Mild  degenerative changes.      Demineralization of the bones. Changes of old healed granulomas  disease      Signed by: Brian Cardoza 3/25/2024 10:38 PM  Dictation workstation:   [id number]       Assessment/Plan   Principal Problem:    Generalized weakness    General Weakness  Frequent falls  -Presented to the ER with complaints of N/V, weakness, and frequent falls. Imaging was negative for acute process. Vitals are stable. Lab work is unremarkable.   - IVF 75mL/Hr  - PT/OT eval and treat  - EKG NSR with a rate of 88 bpm  - Telemetry monitoring   - Nausea control  - Regular diet, ensure added  - monitor and replace electrolytes    COPD/HTN/Peripheral neuropathy  - Resume home meds when verified    DVTp: Lovenox 40mg daily    PLAN: TCC for discharge planning    HOWIE Rodriguez-CNP    Plan of care was discussed extensively with patient.  Patient verbalized understanding through teach back method.  All question and concerns addressed upon examination.    Of note, this documentation is completed using the Dragon Dictation system (voice recognition software). There may be spelling and/or grammatical errors that were not corrected prior to final submission.

## 2024-03-26 NOTE — NURSING NOTE
Patient unsure of home medications, states that her friend has all her medications in his car after she had got evicted three weeks ago. Patient states she hasn't taken medications in a few days. Patient stated she takes a pill for her blood pressure but unsure of the name, she takes magnesium BID, and vitamin b12.

## 2024-03-27 ENCOUNTER — APPOINTMENT (OUTPATIENT)
Dept: RADIOLOGY | Facility: HOSPITAL | Age: 59
End: 2024-03-27
Payer: MEDICAID

## 2024-03-27 LAB
ANION GAP SERPL CALC-SCNC: 12 MMOL/L (ref 10–20)
BASOPHILS # BLD AUTO: 0.02 X10*3/UL (ref 0–0.1)
BASOPHILS NFR BLD AUTO: 0.4 %
BUN SERPL-MCNC: 14 MG/DL (ref 6–23)
CALCIUM SERPL-MCNC: 8.9 MG/DL (ref 8.6–10.3)
CHLORIDE SERPL-SCNC: 106 MMOL/L (ref 98–107)
CO2 SERPL-SCNC: 24 MMOL/L (ref 21–32)
CREAT SERPL-MCNC: 0.57 MG/DL (ref 0.5–1.05)
EGFRCR SERPLBLD CKD-EPI 2021: >90 ML/MIN/1.73M*2
EOSINOPHIL # BLD AUTO: 0.03 X10*3/UL (ref 0–0.7)
EOSINOPHIL NFR BLD AUTO: 0.5 %
ERYTHROCYTE [DISTWIDTH] IN BLOOD BY AUTOMATED COUNT: 15.9 % (ref 11.5–14.5)
FERRITIN SERPL-MCNC: 880 NG/ML (ref 8–150)
FOLATE SERPL-MCNC: 3.4 NG/ML
GLUCOSE SERPL-MCNC: 123 MG/DL (ref 74–99)
HCT VFR BLD AUTO: 23.3 % (ref 36–46)
HGB BLD-MCNC: 7.9 G/DL (ref 12–16)
HOLD SPECIMEN: NORMAL
IMM GRANULOCYTES # BLD AUTO: 0.19 X10*3/UL (ref 0–0.7)
IMM GRANULOCYTES NFR BLD AUTO: 3.4 % (ref 0–0.9)
IRON SATN MFR SERPL: 19 % (ref 25–45)
IRON SERPL-MCNC: 47 UG/DL (ref 35–150)
LYMPHOCYTES # BLD AUTO: 1.91 X10*3/UL (ref 1.2–4.8)
LYMPHOCYTES NFR BLD AUTO: 34.3 %
MAGNESIUM SERPL-MCNC: 1.46 MG/DL (ref 1.6–2.4)
MCH RBC QN AUTO: 34.8 PG (ref 26–34)
MCHC RBC AUTO-ENTMCNC: 33.9 G/DL (ref 32–36)
MCV RBC AUTO: 103 FL (ref 80–100)
MONOCYTES # BLD AUTO: 0.59 X10*3/UL (ref 0.1–1)
MONOCYTES NFR BLD AUTO: 10.6 %
NEUTROPHILS # BLD AUTO: 2.83 X10*3/UL (ref 1.2–7.7)
NEUTROPHILS NFR BLD AUTO: 50.8 %
NRBC BLD-RTO: 0.4 /100 WBCS (ref 0–0)
PLATELET # BLD AUTO: 348 X10*3/UL (ref 150–450)
POTASSIUM SERPL-SCNC: 3.8 MMOL/L (ref 3.5–5.3)
RBC # BLD AUTO: 2.27 X10*6/UL (ref 4–5.2)
SODIUM SERPL-SCNC: 138 MMOL/L (ref 136–145)
TIBC SERPL-MCNC: 254 UG/DL (ref 240–445)
UIBC SERPL-MCNC: 207 UG/DL (ref 110–370)
VIT B12 SERPL-MCNC: 869 PG/ML (ref 211–911)
WBC # BLD AUTO: 5.6 X10*3/UL (ref 4.4–11.3)

## 2024-03-27 PROCEDURE — 2500000001 HC RX 250 WO HCPCS SELF ADMINISTERED DRUGS (ALT 637 FOR MEDICARE OP): Performed by: STUDENT IN AN ORGANIZED HEALTH CARE EDUCATION/TRAINING PROGRAM

## 2024-03-27 PROCEDURE — 82374 ASSAY BLOOD CARBON DIOXIDE: CPT | Performed by: STUDENT IN AN ORGANIZED HEALTH CARE EDUCATION/TRAINING PROGRAM

## 2024-03-27 PROCEDURE — 92611 MOTION FLUOROSCOPY/SWALLOW: CPT | Mod: GN

## 2024-03-27 PROCEDURE — 1100000001 HC PRIVATE ROOM DAILY

## 2024-03-27 PROCEDURE — 97535 SELF CARE MNGMENT TRAINING: CPT | Mod: GO,CO

## 2024-03-27 PROCEDURE — 2500000004 HC RX 250 GENERAL PHARMACY W/ HCPCS (ALT 636 FOR OP/ED)

## 2024-03-27 PROCEDURE — 99232 SBSQ HOSP IP/OBS MODERATE 35: CPT | Performed by: STUDENT IN AN ORGANIZED HEALTH CARE EDUCATION/TRAINING PROGRAM

## 2024-03-27 PROCEDURE — 83735 ASSAY OF MAGNESIUM: CPT | Performed by: STUDENT IN AN ORGANIZED HEALTH CARE EDUCATION/TRAINING PROGRAM

## 2024-03-27 PROCEDURE — 82746 ASSAY OF FOLIC ACID SERUM: CPT | Performed by: STUDENT IN AN ORGANIZED HEALTH CARE EDUCATION/TRAINING PROGRAM

## 2024-03-27 PROCEDURE — 2500000004 HC RX 250 GENERAL PHARMACY W/ HCPCS (ALT 636 FOR OP/ED): Performed by: STUDENT IN AN ORGANIZED HEALTH CARE EDUCATION/TRAINING PROGRAM

## 2024-03-27 PROCEDURE — 97530 THERAPEUTIC ACTIVITIES: CPT | Mod: GO,CO

## 2024-03-27 PROCEDURE — 92610 EVALUATE SWALLOWING FUNCTION: CPT | Mod: GN

## 2024-03-27 PROCEDURE — 83540 ASSAY OF IRON: CPT | Performed by: STUDENT IN AN ORGANIZED HEALTH CARE EDUCATION/TRAINING PROGRAM

## 2024-03-27 PROCEDURE — 85025 COMPLETE CBC W/AUTO DIFF WBC: CPT | Performed by: STUDENT IN AN ORGANIZED HEALTH CARE EDUCATION/TRAINING PROGRAM

## 2024-03-27 PROCEDURE — 82607 VITAMIN B-12: CPT | Performed by: STUDENT IN AN ORGANIZED HEALTH CARE EDUCATION/TRAINING PROGRAM

## 2024-03-27 PROCEDURE — 82728 ASSAY OF FERRITIN: CPT | Performed by: STUDENT IN AN ORGANIZED HEALTH CARE EDUCATION/TRAINING PROGRAM

## 2024-03-27 PROCEDURE — 2500000001 HC RX 250 WO HCPCS SELF ADMINISTERED DRUGS (ALT 637 FOR MEDICARE OP)

## 2024-03-27 PROCEDURE — 74230 X-RAY XM SWLNG FUNCJ C+: CPT | Performed by: RADIOLOGY

## 2024-03-27 PROCEDURE — 74230 X-RAY XM SWLNG FUNCJ C+: CPT

## 2024-03-27 PROCEDURE — 36415 COLL VENOUS BLD VENIPUNCTURE: CPT | Performed by: STUDENT IN AN ORGANIZED HEALTH CARE EDUCATION/TRAINING PROGRAM

## 2024-03-27 PROCEDURE — 97530 THERAPEUTIC ACTIVITIES: CPT | Mod: GP,CQ

## 2024-03-27 RX ORDER — BUPROPION HYDROCHLORIDE 150 MG/1
150 TABLET ORAL DAILY
Status: DISCONTINUED | OUTPATIENT
Start: 2024-03-27 | End: 2024-03-30 | Stop reason: HOSPADM

## 2024-03-27 RX ORDER — DICLOFENAC SODIUM 10 MG/G
2 GEL TOPICAL 2 TIMES DAILY PRN
COMMUNITY

## 2024-03-27 RX ORDER — PANTOPRAZOLE SODIUM 40 MG/1
40 TABLET, DELAYED RELEASE ORAL
COMMUNITY

## 2024-03-27 RX ORDER — FOLIC ACID 1 MG/1
1 TABLET ORAL DAILY
Start: 2024-03-28

## 2024-03-27 RX ORDER — DICLOFENAC SODIUM 10 MG/G
2 GEL TOPICAL 2 TIMES DAILY PRN
Status: DISCONTINUED | OUTPATIENT
Start: 2024-03-27 | End: 2024-03-30 | Stop reason: HOSPADM

## 2024-03-27 RX ORDER — LANOLIN ALCOHOL/MO/W.PET/CERES
100 CREAM (GRAM) TOPICAL DAILY
Start: 2024-03-28

## 2024-03-27 RX ORDER — FOLIC ACID 1 MG/1
1 TABLET ORAL DAILY
Status: DISCONTINUED | OUTPATIENT
Start: 2024-03-27 | End: 2024-03-30 | Stop reason: HOSPADM

## 2024-03-27 RX ORDER — GABAPENTIN 300 MG/1
300 CAPSULE ORAL NIGHTLY
Status: DISCONTINUED | OUTPATIENT
Start: 2024-03-27 | End: 2024-03-30 | Stop reason: HOSPADM

## 2024-03-27 RX ORDER — MAGNESIUM SULFATE HEPTAHYDRATE 40 MG/ML
4 INJECTION, SOLUTION INTRAVENOUS ONCE
Status: COMPLETED | OUTPATIENT
Start: 2024-03-27 | End: 2024-03-27

## 2024-03-27 RX ORDER — MULTIVIT-MIN/IRON FUM/FOLIC AC 7.5 MG-4
1 TABLET ORAL DAILY
Status: DISCONTINUED | OUTPATIENT
Start: 2024-03-27 | End: 2024-03-30 | Stop reason: HOSPADM

## 2024-03-27 RX ORDER — MIRTAZAPINE 15 MG/1
15 TABLET, FILM COATED ORAL NIGHTLY
Status: DISCONTINUED | OUTPATIENT
Start: 2024-03-27 | End: 2024-03-30 | Stop reason: HOSPADM

## 2024-03-27 RX ORDER — BUPROPION HYDROCHLORIDE 150 MG/1
150 TABLET ORAL DAILY
COMMUNITY

## 2024-03-27 RX ORDER — MONTELUKAST SODIUM 10 MG/1
10 TABLET ORAL NIGHTLY
COMMUNITY

## 2024-03-27 RX ORDER — MONTELUKAST SODIUM 10 MG/1
10 TABLET ORAL NIGHTLY
Status: DISCONTINUED | OUTPATIENT
Start: 2024-03-27 | End: 2024-03-30 | Stop reason: HOSPADM

## 2024-03-27 RX ORDER — LANOLIN ALCOHOL/MO/W.PET/CERES
400 CREAM (GRAM) TOPICAL 2 TIMES DAILY
Status: DISCONTINUED | OUTPATIENT
Start: 2024-03-27 | End: 2024-03-30 | Stop reason: HOSPADM

## 2024-03-27 RX ORDER — METOPROLOL TARTRATE 50 MG/1
50 TABLET ORAL 2 TIMES DAILY
COMMUNITY

## 2024-03-27 RX ORDER — METOPROLOL TARTRATE 50 MG/1
50 TABLET ORAL 2 TIMES DAILY
Status: DISCONTINUED | OUTPATIENT
Start: 2024-03-27 | End: 2024-03-30 | Stop reason: HOSPADM

## 2024-03-27 RX ORDER — DICLOFENAC SODIUM 1 MG/ML
1 SOLUTION/ DROPS OPHTHALMIC 4 TIMES DAILY
Status: ON HOLD | COMMUNITY
End: 2024-03-27 | Stop reason: ENTERED-IN-ERROR

## 2024-03-27 RX ORDER — DICLOFENAC SODIUM 1 MG/ML
1 SOLUTION/ DROPS OPHTHALMIC 4 TIMES DAILY
Status: DISCONTINUED | OUTPATIENT
Start: 2024-03-27 | End: 2024-03-27

## 2024-03-27 RX ORDER — MIRTAZAPINE 15 MG/1
15 TABLET, FILM COATED ORAL NIGHTLY
COMMUNITY

## 2024-03-27 RX ORDER — PANTOPRAZOLE SODIUM 40 MG/1
40 TABLET, DELAYED RELEASE ORAL
Status: DISCONTINUED | OUTPATIENT
Start: 2024-03-28 | End: 2024-03-27 | Stop reason: SDUPTHER

## 2024-03-27 RX ORDER — UBIDECARENONE 75 MG
1000 CAPSULE ORAL DAILY
Status: DISCONTINUED | OUTPATIENT
Start: 2024-03-27 | End: 2024-03-30 | Stop reason: HOSPADM

## 2024-03-27 RX ORDER — MULTIVIT-MIN/IRON FUM/FOLIC AC 7.5 MG-4
1 TABLET ORAL DAILY
Start: 2024-03-28

## 2024-03-27 RX ORDER — LANOLIN ALCOHOL/MO/W.PET/CERES
100 CREAM (GRAM) TOPICAL DAILY
Status: DISCONTINUED | OUTPATIENT
Start: 2024-03-27 | End: 2024-03-30 | Stop reason: HOSPADM

## 2024-03-27 RX ADMIN — MIRTAZAPINE 15 MG: 15 TABLET, FILM COATED ORAL at 20:48

## 2024-03-27 RX ADMIN — BARIUM SULFATE 100 ML: 0.81 POWDER, FOR SUSPENSION ORAL at 11:56

## 2024-03-27 RX ADMIN — Medication 1 TABLET: at 14:46

## 2024-03-27 RX ADMIN — MAGNESIUM OXIDE 400 MG (241.3 MG MAGNESIUM) TABLET 400 MG: TABLET at 20:48

## 2024-03-27 RX ADMIN — FOLIC ACID 1 MG: 1 TABLET ORAL at 14:46

## 2024-03-27 RX ADMIN — CYANOCOBALAMIN TAB 500 MCG 1000 MCG: 500 TAB at 14:46

## 2024-03-27 RX ADMIN — PANTOPRAZOLE SODIUM 40 MG: 40 TABLET, DELAYED RELEASE ORAL at 05:48

## 2024-03-27 RX ADMIN — Medication 100 MG: at 14:46

## 2024-03-27 RX ADMIN — SODIUM CHLORIDE 75 ML/HR: 9 INJECTION, SOLUTION INTRAVENOUS at 05:49

## 2024-03-27 RX ADMIN — METOPROLOL TARTRATE 50 MG: 50 TABLET, FILM COATED ORAL at 20:48

## 2024-03-27 RX ADMIN — BARIUM SULFATE 40 ML: 400 SUSPENSION ORAL at 11:56

## 2024-03-27 RX ADMIN — BUPROPION HYDROCHLORIDE 150 MG: 150 TABLET, EXTENDED RELEASE ORAL at 14:46

## 2024-03-27 RX ADMIN — MAGNESIUM SULFATE IN WATER 4 G: 40 INJECTION, SOLUTION INTRAVENOUS at 09:00

## 2024-03-27 RX ADMIN — MONTELUKAST SODIUM 10 MG: 10 TABLET, FILM COATED ORAL at 20:48

## 2024-03-27 RX ADMIN — ENOXAPARIN SODIUM 40 MG: 100 INJECTION SUBCUTANEOUS at 09:00

## 2024-03-27 RX ADMIN — POLYETHYLENE GLYCOL 3350 17 G: 17 POWDER, FOR SOLUTION ORAL at 09:00

## 2024-03-27 RX ADMIN — GABAPENTIN 300 MG: 300 CAPSULE ORAL at 20:48

## 2024-03-27 ASSESSMENT — ACTIVITIES OF DAILY LIVING (ADL)
LACK_OF_TRANSPORTATION: YES
HOME_MANAGEMENT_TIME_ENTRY: 23

## 2024-03-27 ASSESSMENT — PAIN - FUNCTIONAL ASSESSMENT
PAIN_FUNCTIONAL_ASSESSMENT: 0-10

## 2024-03-27 ASSESSMENT — LIFESTYLE VARIABLES
NAUSEA AND VOMITING: NO NAUSEA AND NO VOMITING
PAROXYSMAL SWEATS: NO SWEAT VISIBLE
AGITATION: NORMAL ACTIVITY
TREMOR: NO TREMOR
ANXIETY: NO ANXIETY, AT EASE
ORIENTATION AND CLOUDING OF SENSORIUM: ORIENTED AND CAN DO SERIAL ADDITIONS
VISUAL DISTURBANCES: NOT PRESENT
ANXIETY: NO ANXIETY, AT EASE
TREMOR: NO TREMOR
VISUAL DISTURBANCES: NOT PRESENT
NAUSEA AND VOMITING: NO NAUSEA AND NO VOMITING
AUDITORY DISTURBANCES: NOT PRESENT
TOTAL SCORE: 0
AGITATION: NORMAL ACTIVITY
AUDITORY DISTURBANCES: NOT PRESENT
ORIENTATION AND CLOUDING OF SENSORIUM: ORIENTED AND CAN DO SERIAL ADDITIONS
ORIENTATION AND CLOUDING OF SENSORIUM: ORIENTED AND CAN DO SERIAL ADDITIONS
TOTAL SCORE: 0
TOTAL SCORE: 0
ANXIETY: NO ANXIETY, AT EASE
PAROXYSMAL SWEATS: NO SWEAT VISIBLE
HEADACHE, FULLNESS IN HEAD: NOT PRESENT
TREMOR: NO TREMOR
AGITATION: NORMAL ACTIVITY
VISUAL DISTURBANCES: NOT PRESENT
TOTAL SCORE: 0
NAUSEA AND VOMITING: NO NAUSEA AND NO VOMITING
AGITATION: NORMAL ACTIVITY
NAUSEA AND VOMITING: NO NAUSEA AND NO VOMITING
AUDITORY DISTURBANCES: NOT PRESENT
VISUAL DISTURBANCES: NOT PRESENT
HEADACHE, FULLNESS IN HEAD: NOT PRESENT
AUDITORY DISTURBANCES: NOT PRESENT
HEADACHE, FULLNESS IN HEAD: NOT PRESENT
TREMOR: NO TREMOR
PAROXYSMAL SWEATS: NO SWEAT VISIBLE
ANXIETY: NO ANXIETY, AT EASE
HEADACHE, FULLNESS IN HEAD: NOT PRESENT
PAROXYSMAL SWEATS: NO SWEAT VISIBLE
ORIENTATION AND CLOUDING OF SENSORIUM: ORIENTED AND CAN DO SERIAL ADDITIONS

## 2024-03-27 ASSESSMENT — COGNITIVE AND FUNCTIONAL STATUS - GENERAL
MOBILITY SCORE: 14
CLIMB 3 TO 5 STEPS WITH RAILING: TOTAL
DRESSING REGULAR UPPER BODY CLOTHING: A LITTLE
DAILY ACTIVITIY SCORE: 18
MOBILITY SCORE: 14
CLIMB 3 TO 5 STEPS WITH RAILING: A LOT
WALKING IN HOSPITAL ROOM: A LOT
DAILY ACTIVITIY SCORE: 15
TOILETING: A LOT
MOVING TO AND FROM BED TO CHAIR: A LITTLE
PERSONAL GROOMING: A LITTLE
HELP NEEDED FOR BATHING: A LOT
STANDING UP FROM CHAIR USING ARMS: A LOT
WALKING IN HOSPITAL ROOM: A LOT
TURNING FROM BACK TO SIDE WHILE IN FLAT BAD: A LOT
CLIMB 3 TO 5 STEPS WITH RAILING: A LOT
DAILY ACTIVITIY SCORE: 18
DRESSING REGULAR UPPER BODY CLOTHING: A LITTLE
STANDING UP FROM CHAIR USING ARMS: A LITTLE
TOILETING: A LOT
TURNING FROM BACK TO SIDE WHILE IN FLAT BAD: A LOT
HELP NEEDED FOR BATHING: A LITTLE
PERSONAL GROOMING: A LITTLE
EATING MEALS: A LITTLE
MOVING TO AND FROM BED TO CHAIR: A LITTLE
DRESSING REGULAR UPPER BODY CLOTHING: A LITTLE
MOVING FROM LYING ON BACK TO SITTING ON SIDE OF FLAT BED WITH BEDRAILS: A LITTLE
DRESSING REGULAR LOWER BODY CLOTHING: A LITTLE
TOILETING: A LOT
TURNING FROM BACK TO SIDE WHILE IN FLAT BAD: A LITTLE
STANDING UP FROM CHAIR USING ARMS: A LOT
HELP NEEDED FOR BATHING: A LITTLE
MOBILITY SCORE: 16
MOVING TO AND FROM BED TO CHAIR: A LITTLE
PERSONAL GROOMING: A LITTLE
DRESSING REGULAR LOWER BODY CLOTHING: A LOT
DRESSING REGULAR LOWER BODY CLOTHING: A LITTLE
WALKING IN HOSPITAL ROOM: A LITTLE

## 2024-03-27 ASSESSMENT — PAIN SCALES - GENERAL
PAINLEVEL_OUTOF10: 0 - NO PAIN
PAINLEVEL_OUTOF10: 0 - NO PAIN
PAINLEVEL_OUTOF10: 10 - WORST POSSIBLE PAIN
PAINLEVEL_OUTOF10: 0 - NO PAIN

## 2024-03-27 NOTE — PROGRESS NOTES
03/27/24 0715   Discharge Planning   Living Arrangements Alone   Support Systems Friends/neighbors   Assistance Needed yes, pt states she is homeless, has been living in a hotel, Mod Ind ADLS and IADLS with cane and walker, doesn't drive, on disability, multiple falls   Type of Residence Homeless  (was staying in a hotel)   Do you have animals or pets at home? No   Home or Post Acute Services Post acute facilities (Rehab/SNF/etc)   Type of Post Acute Facility Services Skilled nursing;Rehab   Patient expects to be discharged to: SNF   Does the patient need discharge transport arranged? Yes   RoundTrip coordination needed? Yes   Financial Resource Strain   How hard is it for you to pay for the very basics like food, housing, medical care, and heating? Hard   Housing Stability   In the last 12 months, was there a time when you were not able to pay the mortgage or rent on time? Y   In the last 12 months, was there a time when you did not have a steady place to sleep or slept in a shelter (including now)? Y   Transportation Needs   In the past 12 months, has lack of transportation kept you from medical appointments or from getting medications? yes   In the past 12 months, has lack of transportation kept you from meetings, work, or from getting things needed for daily living? Yes   Patient Choice   Provider Choice list and CMS website (https://medicare.gov/care-compare#search) for post-acute Quality and Resource Measure Data were provided and reviewed with: Patient   Patient / Family choosing to utilize agency / facility established prior to hospitalization No     Pt admitted with generalized weakness. AMPAC scores are PT (16) ) OT (15) and recommend continued therapy at low-moderate level. Pt states she is homeless, was living in hotel, pt is disabled, doesn't drive, has had multiple falls and is agreeable to SNF for rehab. Pt provided with SNF list. LENKA Weaver notified of pt homeless and will require SNF for  rehab/skilled services upon DC. CT team will continue to monitor case for progression and DC planning.

## 2024-03-27 NOTE — PROGRESS NOTES
Medical Group Progress Note  ASSESSMENT & PLAN:     Generalized weakness  Frequent falls  - PT/OT  - Weakness is multifactorial, likely in setting of deconditioning along with age as well as social situation  - Will need placement on discharge  - Workup unremarkable for acute findings    Esophageal dysmotility  - SLP following  - Seen on MBS today   - Outpatient follow up for EGD    Essential hypertension  Peripheral neuropathy  COPD, not in exacerbation  - Per nursing staff spoke with pharmacy patient has not refilled any recent medications  - Have discussed with staff who will be reaching out to PCP office    Hypokalemia  - Replace as needed    Anemia of chronic disease  - Hemoglobin 7.9 today (baseline 10)  - No signs of acute bleeding  - Transfuse if hemoglobin <7.0    Alcohol use disorder  - Unclear exact history  - CIWA protocol  - no needs for ativan yet, will likely stop 3/28  - Added on multivitamin, folic acid, and thiamine supplementation    VTE prophylaxis: Enoxaparin subcutaneous    Disposition/Daily update: Patient with PT/OT and will need SNF on placement, awaiting pre-CERT.  Did also have MBS with SLP and did have some esophageal dysmotility and will need outpatient follow up for EGD.      ---Of note, this documentation is completed using the Dragon Dictation system (voice recognition software). There may be spelling and/or grammatical errors that were not corrected prior to final submission.---    Marshal Banks MD    SUBJECTIVE     Patient was seen and examined at bedside this morning.  Had just done PT/OT and was feeling better than yesterday.  Had no complaints for me today.      OBJECTIVE:     Last Recorded Vitals:  Vitals:    03/26/24 1500 03/26/24 2010 03/27/24 0506 03/27/24 0902   BP: 102/60 99/57 100/65 102/60   BP Location:  Right arm Right arm Right arm   Patient Position:  Lying Lying Sitting   Pulse: 76 98 95 96   Resp:    14   Temp: 36.8 °C (98.2 °F) 36.8 °C (98.2 °F) 37.1 °C (98.8  °F) 36.2 °C (97.2 °F)   TempSrc:  Temporal Temporal Temporal   SpO2:  100% 100% 100%   Weight:       Height:         Last I/O:  I/O last 3 completed shifts:  In: 1353.8 (26 mL/kg) [I.V.:1353.8 (26 mL/kg)]  Out: 800 (15.3 mL/kg) [Urine:800 (0.4 mL/kg/hr)]  Weight: 52.2 kg     Physical Exam  Vitals reviewed.   Constitutional:       General: She is not in acute distress.     Appearance: Normal appearance. She is not toxic-appearing.   HENT:      Head: Normocephalic and atraumatic.   Eyes:      Extraocular Movements: Extraocular movements intact.      Conjunctiva/sclera: Conjunctivae normal.   Cardiovascular:      Rate and Rhythm: Normal rate and regular rhythm.      Pulses: Normal pulses.      Heart sounds: Normal heart sounds.   Pulmonary:      Effort: Pulmonary effort is normal. No respiratory distress.      Breath sounds: Normal breath sounds. No wheezing.   Abdominal:      General: Bowel sounds are normal.      Palpations: Abdomen is soft.      Tenderness: There is no abdominal tenderness. There is no guarding.   Musculoskeletal:         General: Normal range of motion.      Cervical back: Normal range of motion and neck supple.   Neurological:      General: No focal deficit present.      Mental Status: She is alert and oriented to person, place, and time. Mental status is at baseline.     Inpatient Medications:  enoxaparin, 40 mg, subcutaneous, q24h  pantoprazole, 40 mg, oral, Daily   Or  pantoprazole, 40 mg, intravenous, Daily  polyethylene glycol, 17 g, oral, Daily    PRN Medications  PRN medications: acetaminophen **OR** acetaminophen **OR** acetaminophen, LORazepam **OR** LORazepam **OR** LORazepam, lubricating eye drops, ondansetron ODT **OR** ondansetron  Continuous Medications:  sodium chloride 0.9%, 75 mL/hr, Last Rate: 75 mL/hr (03/27/24 0549)      LABS AND IMAGING:     Labs:  Results from last 7 days   Lab Units 03/27/24  0418 03/26/24  0629 03/25/24  2136   WBC AUTO x10*3/uL 5.6 6.3 5.6   RBC AUTO  x10*6/uL 2.27* 2.43* 2.94*   HEMOGLOBIN g/dL 7.9* 8.7* 10.5*   HEMATOCRIT % 23.3* 24.5* 29.8*   MCV fL 103* 101* 101*   MCH pg 34.8* 35.8* 35.7*   MCHC g/dL 33.9 35.5 35.2   RDW % 15.9* 15.8* 15.9*   PLATELETS AUTO x10*3/uL 348 372 422       Results from last 7 days   Lab Units 03/27/24  0417 03/26/24  0629 03/25/24  2136   SODIUM mmol/L 138 134* 133*   POTASSIUM mmol/L 3.8 3.3* 4.0   CHLORIDE mmol/L 106 95* 90*   CO2 mmol/L 24 20* 20*   BUN mg/dL 14 24* 25*   CREATININE mg/dL 0.57 0.60 0.79   GLUCOSE mg/dL 123* 79 89   PROTEIN TOTAL g/dL  --   --  7.8   CALCIUM mg/dL 8.9 9.1 10.4*   BILIRUBIN TOTAL mg/dL  --   --  0.6   ALK PHOS U/L  --   --  87   AST U/L  --   --  45*   ALT U/L  --   --  21       Results from last 7 days   Lab Units 03/27/24  0417 03/25/24  2136   MAGNESIUM mg/dL 1.46* 1.59*       Results from last 7 days   Lab Units 03/25/24  2237 03/25/24  2136   TROPHS ng/L 4 4       Imaging:  FL modified barium swallow study  Narrative: Interpreted By:  Corina South,   STUDY:  FL MODIFIED BARIUM SWALLOW STUDY;; 3/27/2024 11:50 am      INDICATION:  Signs/Symptoms:assess swallow.      COMPARISON:  None.      ACCESSION NUMBER(S):  [id number]      ORDERING CLINICIAN:  BABITA BROTHERS      TECHNIQUE:  MBSS completed. Informed verbal consent obtained prior to completion  of exam. Trials of thin, nectar thick, puree, soft-solids, and  regular solids given. Fluoroscopy time :  .      SLP: Corina South M.A. CCC-SLP  Phone/Pager: [phone]      SPEECH FINDINGS:  Reason for referral: Globus sensation.  Patient hx: HTN, COPD, neuropathy, hypokalemia. Pt. Required an  increased processing time to respond to SLP. Respiratory status: Room  air Previous diet: Regular/thin      FINAL SPEECH RECOMMENDATIONS      Diet recommendations/feeding strategies: REGULAR/THIN- add moisture  to dry solids, alternate bites/sips, upright 90 degrees for intake      Follow-up speech therapy recommended:  x1-2 sessions to  ensure  tolerance of diet and use of strategies      Short term goals: Pt to tolerate regular/thin liquid diet without  pulmonary compromise Pt. To independently use safe swallow strategies  to increase esophageal clearance and decrease risk of aspiration  during intake      Education provided: Yes educated on results and recommendations      Treatment Provided today: No      Additional consult suggested: Possible GI if globus complaints  continue with use of strategies      Repeat study/ dc plan: Repeat study as clinically indicated      Mechanics of the swallow summary:  *Oral phase: Slowed oral clearance and oral residue post solids.  *Pharyngeal phase: Pharyngeal phase of swallow did not start until  bolus head reached pyriform sinuses however did not impact integrity  of swallow. Laryngeal penetration x1 trial of thin liquids. No  aspiration present during study. Minimally decreased cricopharyngeal  opening. Trace vallecular residue post swallow. Cleared with  consecutive swallows. *Esophageal phase: Slowed clearance of bolus  and backflow of bolus noted      SLP impressions with severity rating: Minimal pharyngeal dysphagia  and esophageal dysfunction      Thin Liquids (MBSS)  Rosenbek's Penetration Aspiration Scale, Thin Liquids (MBSS):  1. NO ASPIRATION & NO PENETRATION - no aspiration, contrast does  not enter airway Laryngeal penetration x1 of 4 trials      Nectar Thick Liquids (MBSS)  Rosenbek's Penetration Aspiration Scale, Nectar thick liquids (MBSS):  1. NO ASPIRATION & NO PENETRATION - no aspiration, contrast does  not enter airway              Purees (MBSS)  Rosenbek's Penetration Aspiration Scale, Purees (MBSS):  1. NO ASPIRATION & NO PENETRATION - no aspiration, contrast does  not enter airway      Solids (MBSS)  Rosenbek's Penetration Aspiration Scale, Solids (MBSS):  1. NO ASPIRATION & NO PENETRATION - no aspiration, contrast does  not enter airway      Speech Therapy section of this report  signed by  Corina South M.A., CCC-SLP on 3/27/2024 at 1:13 pm.      RADIOLOGY FINDINGS:          Radiology section of this report signed by .      Impression: .      MACRO:  None          Dictation workstation:   [id number]

## 2024-03-27 NOTE — PROGRESS NOTES
Physical Therapy    Physical Therapy Treatment    Patient Name: Jolanta Castaneda  MRN: [id number]  Today's Date: 3/27/2024  Time Calculation  Start Time: 1344  Stop Time: 1410  Time Calculation (min): 26 min       Assessment/Plan   End of Session Patient Position:  (Patient returned to bed after treatment.  Call light and tray in reach.  Bed alarm on.  Patient states she thought she was able to get up on her own and wasn't aware that she had an alarm on.)    PT Plan: Skilled PT  PT Frequency: 3 times per week  PT Discharge Recommendations: Low intensity level of continued care, Moderate intensity level of continued care    PT Recommended Transfer Status: Assist x1, Assistive device    General Visit Information:   PT  Visit  PT Received On: 03/27/24    General  Prior to Session Communication: Bedside nurse  Patient Position Received:  (Patient presents in bed, agreeable to PT)    General Comment:  (Patient admitted with weakness and falls)    General Observations:   General Observation:  (IV, purwick, tele, alarm)    Precautions:  Precautions  Medical Precautions: Fall precautions      Pain:  Pain Assessment  Pain Assessment: 0-10  Pain Score: 10 - Worst possible pain  Pain Location:  (Pain in BLE from neuropathy)    Cognition:  Cognition  Overall Cognitive Status: Impaired (easily distractible)  Orientation Level: Oriented X4  Insight: Mild  Processing Speed: Delayed      Balance:   Dynamic Standing Balance  Dynamic Standing-Comments:  (Fair dynamic stand with ww support)          Bed Mobility  Bed Mobility:  (supine-->sit: SBAx1  HOB raised 45 degrees, patient utilizing bed rail to pull herself up with. sit-->supine: SBAx1)    Ambulation/Gait Training  Ambulation/Gait Training Performed:  (Patient amb 40' with ww and CGAx1.  Cues needed to stand tall and remain inside walkers OLGA LIDIA.)    Transfers  Transfer:  (supine-->sit: CGAx1  Three stands performed from EOB.  Instruction needed for hand placement)        Outcome  Measures:  [facility] Basic Mobility  Turning from your back to your side while in a flat bed without using bedrails: A little  Moving from lying on your back to sitting on the side of a flat bed without using bedrails: A little  Moving to and from bed to chair (including a wheelchair): A little  Standing up from a chair using your arms (e.g. wheelchair or bedside chair): A little  To walk in hospital room: A little  Climbing 3-5 steps with railing: Total  Basic Mobility - Total Score: 16    Education Documentation  Mobility Training, taught by Carola Pedraza PTA at 3/27/2024  3:50 PM.  Learner: Patient  Readiness: Acceptance  Method: Explanation, Demonstration  Response: Needs Reinforcement    Education Comments  Safety reinforced throughout treatment       Encounter Problems       Encounter Problems (Active)       PT Problem       Pt will demonstrate mod I  with bed mobility to edge of bed.   (Progressing)       Start:  03/26/24    Expected End:  04/09/24            Pt will demonstrate mod I  with sit to stand/chair transfers with FWW.   (Progressing)       Start:  03/26/24    Expected End:  04/09/24            Pt will ambulate 50 feet with FWW SUP.   (Progressing)       Start:  03/26/24    Expected End:  04/09/24            Pt to demo improved BLE strength by being able to complete supine/seated thera ex 2x20 BLEs with 4 or less rest breaks .   (Not Progressing)       Start:  03/26/24    Expected End:  04/09/24               Pain - Adult

## 2024-03-27 NOTE — PROCEDURES
"  Modified Barium Swallow Study     Patient Name: Jolanta Castaneda  MRN: [id number]  : 1965  Today's Date: 24  Time Calculation  Start Time: 1100  Stop Time: 1130  Time Calculation (min): 30 min       Recommendations:  REGULAR/THIN- add moisture to dry solids, alternate bites/sips, upright 90 degrees for intake   Crush pills as needed and place in puree     Assessment/Impression:    Full detailed SLP/Radiologist Modified Barium Swallow study report can be found under Chart Review tab, Imaging tab and  titled \"FL Modified Barium Swallow Study\"      Pt. Presenting with Minimal pharyngeal dysphagia and esophageal dysfunction     Plan:  Treatment/Interventions: Patient/family education, Bolus trials  SLP Plan: Skilled SLP warranted  SLP Frequency: 1-2 follow up sessions  Duration: 30 days    Discussed POC: Patient, Nursing   Discussed Risks/Benefits: Yes  Patient/Caregiver Agreeable: Yes    Pain:   Rating 0-10: 0        GOALS:  Pt to tolerate regular/thin liquid diet without pulmonary compromise   Pt. To independently use safe swallow strategies to increase esophageal clearance and decrease risk of aspiration during intake     Education:   Pt. Educated on results of MBS study, recommended diet and recommended safe swallow strategies      "

## 2024-03-27 NOTE — PROGRESS NOTES
Per drew, pt. Will need snf setting  on discharge [facility] scores 16/15 and is homeless.  Met with pt. Who is a/o x 3, states she woke up one day and was homlesss does not know how this happened.  She states she has been staying at a motel for about a mo. A friend helped with paying for some of it.  She does have a son listed as contact; but states they do not see eye to eye.  Info. Provided on coordinated entry, shelters in both [place] and [facility], and appl./info. to [facility] housing.  She states she has already filled out an appl. And is on waiting list.  Advised to give them a call for update.  discharge planning of snf discussed, and facilities that may accept with [employer] medicaid.  She prefers to stay close by and is requesting bill, unsure if they are a provider she understands may need to go to another facility.  [facility], [facility] and [facility]  discussed as possible options.  Referral sent to bill, awaiting response and will require ins. Precert.    Update:  bill has accepted pt. Completed and signed gold form needed to complete 07000  for  [employer] medicaid. Drew aware.    Update:  07000 completed and bill notified. They have started ins. Precert.

## 2024-03-27 NOTE — CONSULTS
"Nutrition Initial Assessment:   Nutrition Assessment    Reason for Assessment: Dietitian discretion, Admission nursing screening (Low BMI and missed MST)    Patient is a 59 y.o. female presenting with generalized weakness. Pt out of room at time of attempted assessment. All hx obtained via chart review.       Nutrition History:  Food and Nutrient History: Per physician notes, pt has been unable to tolerate food or fluids for a couple of days PTA due to N/V. Noted hx of alcohol use - unclear amount/frequency. Ensure high protein ordered by physician.  Vitamin/Herbal Supplement Use: vitamin D3, vitamin B12 per home med list  Food Allergies/Intolerances:  None  GI Symptoms: Nausea and Vomiting  Oral Problems:  BELLE       Anthropometrics:  Height: 175.3 cm (5' 9\")   Weight: 52.2 kg (115 lb)   BMI (Calculated): 16.97  IBW/kg (Dietitian Calculated): 65.9 kg          Weight History:   Wt Readings from Last 5 Encounters:   03/25/24 52.2 kg (115 lb)   02/03/23 54 kg (119 lb)   12/02/22 52.6 kg (116 lb)   10/28/22 53.5 kg (118 lb)   10/11/22 50.9 kg (112 lb 5 oz)      Weight Change %:  Weight History / % Weight Change: Based on available wt records, no evidence of significant wt loss at this time.  Significant Weight Loss: No    Nutrition Focused Physical Exam Findings:  defer: pt unavailable  Subcutaneous Fat Loss:   Orbital Fat Pads: Defer  Muscle Wasting:  Temporalis: Defer  Edema:  Edema: none  Physical Findings:  Skin: Negative    Nutrition Significant Labs:    Reviewed   Nutrition Specific Medications:  Reviewed     I/O:    ;      Dietary Orders (From admission, onward)       Start     Ordered    03/27/24 1401  Oral nutritional supplements  Until discontinued        Question Answer Comment   Deliver with All meals    Select supplement: Ensure Plus High Protein        03/27/24 1400    03/26/24 0138  Adult diet Regular  Diet effective now        Question:  Diet type  Answer:  Regular    03/26/24 0137                   "   Estimated Needs:   Total Energy Estimated Needs (kCal): 1805 kCal  Method for Estimating Needs: 25 kcal/kg ABW + 500 for wt gain  Total Protein Estimated Needs (g): 63 g  Method for Estimating Needs: 1.2 g/kg ABW  Total Fluid Estimated Needs (mL): 1305 mL  Method for Estimating Needs: 25 ml/kg ABW        Nutrition Diagnosis   Malnutrition Diagnosis  Patient has Malnutrition Diagnosis:  (unable to determine at this time)    Nutrition Diagnosis  Patient has Nutrition Diagnosis: Yes  Diagnosis Status (1): New  Nutrition Diagnosis 1: Underweight  Related to (1): suspected previous wt loss/poor nutritional intake  As Evidenced by (1): BMI 17 kg/m2       Nutrition Interventions/Recommendations         Nutrition Prescription:  Individualized Nutrition Prescription Provided for : Regular diet with ONS        Nutrition Interventions:   Interventions: Meals and snacks, Medical food supplement  Meals and Snacks: General healthful diet  Goal: Consumes 3 meals per day  Medical Food Supplement: Commercial beverage  Goal: Ensure Plus High Protein TID (provides 350 kcal, 20 g protein per serving)  Additional Interventions: Pt may benefit from supplemental multivitamin, folic acid, thiamine due to hx of alcohol use    Collaboration and Referral of Nutrition Care: Collaboration by nutrition professional with other providers  Goal: Secure mesage sent to Dr. Banks with recommendations    Nutrition Education:   Pt unavailable - will assess education needs at follow up       Nutrition Monitoring and Evaluation   Food/Nutrient Related History Monitoring  Monitoring and Evaluation Plan: Energy intake, Amount of food, Fluid intake  Energy Intake: Estimated energy intake  Criteria: Pt meets >75% of estimated energy needs  Fluid Intake: Estimated fluid intake  Criteria: Meets >75% of estimated fluid needs  Amount of Food: Estimated amout of food, Medical food intake  Criteria: Pt consumes >75% of meals and supplements    Body  Composition/Growth/Weight History  Monitoring and Evaluation Plan: Weight  Weight: Measured weight  Criteria: Maintains stable weight/gradual wt gain    Biochemical Data, Medical Tests and Procedures  Monitoring and Evaluation Plan: Glucose/endocrine profile, Electrolyte/renal panel  Electrolyte and Renal Panel: Sodium, Potassium, Phosphorus  Criteria: Electrolytes WNL  Glucose/Endocrine Profile: Glucose, casual  Criteria: BG within desirable range    Nutrition Focused Physical Findings  Monitoring and Evaluation Plan: Digestive System  Digestive System: Nausea, Vomiting  Criteria: Improvement in GI issues       Time Spent/Follow-up Reminder:   Time Spent (min): 45 minutes  Last Date of Nutrition Visit: 03/27/24  Nutrition Follow-Up Needed?: 3-5 days  Follow up Comment: ensure plus high protein TID

## 2024-03-27 NOTE — PROGRESS NOTES
"Occupational Therapy    OT Treatment    Patient Name: Jolanta Castaneda  MRN: [id number]  Today's Date: 3/27/2024  Time Calculation  Start Time: 0918  Stop Time: 0956  Time Calculation (min): 38 min       Assessment:  End of Session Communication: Bedside nurse, Physician, Care Coordinator  End of Session Patient Position: Up in chair, Alarm on     Plan:  Treatment Interventions: ADL retraining, Functional transfer training, UE strengthening/ROM, Endurance training, Patient/family training  OT Frequency: 2 times per week  Treatment Interventions: ADL retraining, Functional transfer training, UE strengthening/ROM, Endurance training, Patient/family training    Subjective   Previous Visit Info:  OT Last Visit  OT Received On: 03/27/24  General:  General  Prior to Session Communication: Bedside nurse  Patient Position Received: Bed, 2 rail up, Alarm on  General Comment: cleared for therapy by nursing  Precautions:  Medical Precautions: Fall precautions   Pain:  Pain Assessment  Pain Score: 0 - No pain    Objective    Cognition:  Cognition  Orientation Level: Oriented X4  Insight: Mild  Activities of Daily Living: LE Dressing  LE Dressing: Yes  Pants Level of Assistance:  (donned pants with comp tech and mod a. pants mgmt while in stance with min a.)  Sock Level of Assistance: Maximum assistance (donned right sock)  LE Dressing Where Assessed: Edge of bed  Functional Standing Tolerance:  Functional Standing Tolerance Comments: patient stood for a total of 2 mins this date with fair/fair- balance with 1 LOB secondary to \"woozy\" feeling when first stood.  educated on gaining balance before ambualting or reaching for pants.  1 seated rest break secondary to fatigue  Bed Mobility/Transfers: Bed Mobility 1  Bed Mobility 1: Supine to sitting  Level of Assistance 1: Minimum assistance    Transfers  Transfer: Yes  Transfer 1  Transfer From 1: Bed to  Transfer to 1: Chair with arms  Technique 1: Sit to stand  Transfer Device 1: " "Walker  Transfer Level of Assistance 1: Minimum assistance  Trials/Comments 1: with verbal cues for hand placement. (patient did report \"woozy\" feeling with positioning changes)  Transfers 2  Transfer Device 2: Walker  Transfer Level of Assistance 2: Minimum assistance    Sitting Balance:  Dynamic Sitting Balance  Dynamic Sitting-Balance:  (fair+)  Standing Balance:  Dynamic Standing Balance  Dynamic Standing-Balance:  (fair/fair-)    Therapy/Activity: Therapeutic Activity  Therapeutic Activity Performed: Yes  Therapeutic Activity 1: patient sat EOB for a total of 10 mins this date with fair/fair- balance with 2--0 ue support as needed during functional ambulation/transfers and ADL tasks    Outcome Measures:[facility] Daily Activity  Putting on and taking off regular lower body clothing: A lot  Bathing (including washing, rinsing, drying): A lot  Putting on and taking off regular upper body clothing: A little  Toileting, which includes using toilet, bedpan or urinal: A lot  Taking care of personal grooming such as brushing teeth: A little  Eating Meals: A little  Daily Activity - Total Score: 15    OP EDUCATION:  Education  Individual(s) Educated: Patient  Education Provided: Symptom management, Ergonomics and postural realignment, Joint protection and energy conservation, Fall precautons, Risk and benefits of OT discussed with patient or other, POC discussed and agreed upon  Equipment:  (EC tech, home DME)  Patient/Caregiver Demonstrated Understanding: yes  Plan of Care Discussed and Agreed Upon: yes  Patient Response to Education: Patient/Caregiver Verbalized Understanding of Information    Goals:  Encounter Problems       Encounter Problems (Active)       OT Goals       Patient will complete household distance functional mobility with a FWW at SBA level.  (Progressing)       Start:  03/26/24    Expected End:  04/09/24            Patient will complete functional transfers with a FWW at SBA level.  (Progressing)       " Start:  03/26/24    Expected End:  04/09/24            Patient will demonstrate fair + dynamic standing balance during functional tasks. (Progressing)       Start:  03/26/24    Expected End:  04/09/24            Patient will complete lower body dressing at a SBA level.  (Progressing)       Start:  03/26/24    Expected End:  04/09/24            Patient will tolerate standing greater than 4 minutes during functional tasks. (Progressing)       Start:  03/26/24    Expected End:  04/09/24

## 2024-03-27 NOTE — PROGRESS NOTES
Inpatient Speech-Language Pathology Clinical Swallow Evaluation       Patient Name: Jolanta Castaneda  MRN: [id number]  : 1965  Today's Date: 24  Time Calculation  Start Time: 830  Stop Time: 843  Time Calculation (min): 13 min         Recommendations:  Solid Diet Recommendations : Regular (IDDSI Level 7)   Liquid Diet Recommendations: Thin (IDDSI Level 0)   Compensatory Swallowing Strategies: Alternate solids and liquids, Upright 90 degrees as possible for all oral intake     Medication Administration Recommendations: Crushed, With Pureed    Recommend completion of MBS study to further assess pharyngeal and esophageal phases of the swallow.   Medical Staff Made Aware: Yes        Assessment:  Assessment Results: Pt. Presenting with suspected esophageal dysfunction. Pharyngeal deficits are possible but will be further assessed during modified barium swallow study.  Pt. Reported she had difficulty with medications the previous date and reports a globus sensation after swallow.  She did not demonstrate s/s aspiration post thin liquid intake.  Inconsistent throat clearing became present post regular solid trials. Pt. Reported minimal relief with thin liquid wash.     Respiratory Status: Room air   Patient Positioning: Upright in Bed   Consistencies Trialed: Consistencies Trialed: Regular (IDDSI Level 7), Thin (IDDSI Level 0) - Straw   Oral Motor: Within Functional Limits  Lingual Strength: Within Functional Limits   Dentition: Adequate/Natural        Plan:  SLP Plan: Skilled SLP Skilled speech therapy for dysphagia treatment is warranted in order to provide training and instruction regarding the use of compensatory swallow strategies, assess tolerance of diet and pt/caregiver education in order to reduce risk of aspiration, dehydration and malnutrition. Complete MBS study   SLP Frequency: 2x per week - to be further determined pending MBS study results.   Duration: 30 days   Next Treatment Priority: complete  MBS study   Discussed POC: Patient, Nursing   Discussed Risks/Benefits: Yes   Patient/Caregiver Agreeable: Yes   Prognosis: Good  SLP Discharge Recommendations: unable to determine at this time, refer to subsequent notes    Goals:  Goals established on 03/27/24  Pt. To tolerate least restrictive diet without pulmonary compromise   Pt. To use safe swallow strategies to decrease risk of aspiration in all observed trials of intake   Further assess pharyngeal phase of the swallow      Subjective   Pt. Sitting upright eating breakfast upon entering room         General Visit Information:  Pt. Admitted with increased weakness, weight loss   Past medical history: HTN, COPD, neuropathy, hypokalemia     Living Environment: Home           Reason for Referral: dysphagia      Current Diet : Regular/thin   Prior to Session Communication: Bedside nurse   Pain:  Pain Assessment  Pain Assessment: 0-10  Pain Score: 0 - No pain         Treatment Completed with evaluation:   No      SLP Inpatient Education:  Pt. educated on safe swallow strategies, recommendation of Modified Barium swallow study and procedure  Patient gave verbal understanding

## 2024-03-28 LAB
ABO GROUP (TYPE) IN BLOOD: NORMAL
ANION GAP SERPL CALC-SCNC: 11 MMOL/L (ref 10–20)
ANTIBODY SCREEN: NORMAL
BASOPHILS # BLD MANUAL: 0.06 X10*3/UL (ref 0–0.1)
BASOPHILS NFR BLD MANUAL: 1 %
BUN SERPL-MCNC: 17 MG/DL (ref 6–23)
CALCIUM SERPL-MCNC: 8.9 MG/DL (ref 8.6–10.3)
CHLORIDE SERPL-SCNC: 106 MMOL/L (ref 98–107)
CO2 SERPL-SCNC: 27 MMOL/L (ref 21–32)
CREAT SERPL-MCNC: 0.48 MG/DL (ref 0.5–1.05)
EGFRCR SERPLBLD CKD-EPI 2021: >90 ML/MIN/1.73M*2
EOSINOPHIL # BLD MANUAL: 0.06 X10*3/UL (ref 0–0.7)
EOSINOPHIL NFR BLD MANUAL: 1 %
ERYTHROCYTE [DISTWIDTH] IN BLOOD BY AUTOMATED COUNT: 16.2 % (ref 11.5–14.5)
GLUCOSE SERPL-MCNC: 95 MG/DL (ref 74–99)
HCT VFR BLD AUTO: 21.3 % (ref 36–46)
HGB BLD-MCNC: 7 G/DL (ref 12–16)
HGB RETIC QN: 27 PG (ref 28–38)
HOLD SPECIMEN: NORMAL
IMM GRANULOCYTES # BLD AUTO: 0.38 X10*3/UL (ref 0–0.7)
IMM GRANULOCYTES NFR BLD AUTO: 6.2 % (ref 0–0.9)
IMMATURE RETIC FRACTION: 35.4 %
LDH SERPL L TO P-CCNC: 146 U/L (ref 84–246)
LYMPHOCYTES # BLD MANUAL: 3.6 X10*3/UL (ref 1.2–4.8)
LYMPHOCYTES NFR BLD MANUAL: 58 %
MCH RBC QN AUTO: 34.7 PG (ref 26–34)
MCHC RBC AUTO-ENTMCNC: 32.9 G/DL (ref 32–36)
MCV RBC AUTO: 105 FL (ref 80–100)
METAMYELOCYTES # BLD MANUAL: 0.12 X10*3/UL
METAMYELOCYTES NFR BLD MANUAL: 2 %
MONOCYTES # BLD MANUAL: 0.37 X10*3/UL (ref 0.1–1)
MONOCYTES NFR BLD MANUAL: 6 %
MYELOCYTES # BLD MANUAL: 0.19 X10*3/UL
MYELOCYTES NFR BLD MANUAL: 3 %
NEUTS SEG # BLD MANUAL: 1.8 X10*3/UL (ref 1.2–7)
NEUTS SEG NFR BLD MANUAL: 29 %
NRBC BLD-RTO: 0.3 /100 WBCS (ref 0–0)
PLATELET # BLD AUTO: 320 X10*3/UL (ref 150–450)
POTASSIUM SERPL-SCNC: 4 MMOL/L (ref 3.5–5.3)
RBC # BLD AUTO: 2.02 X10*6/UL (ref 4–5.2)
RBC MORPH BLD: NORMAL
RETICS #: 0.06 X10*6/UL (ref 0.02–0.08)
RETICS/RBC NFR AUTO: 2.7 % (ref 0.5–2)
RH FACTOR (ANTIGEN D): NORMAL
SODIUM SERPL-SCNC: 140 MMOL/L (ref 136–145)
TARGETS BLD QL SMEAR: NORMAL
TOTAL CELLS COUNTED BLD: 100
WBC # BLD AUTO: 6.2 X10*3/UL (ref 4.4–11.3)

## 2024-03-28 PROCEDURE — 85007 BL SMEAR W/DIFF WBC COUNT: CPT | Performed by: STUDENT IN AN ORGANIZED HEALTH CARE EDUCATION/TRAINING PROGRAM

## 2024-03-28 PROCEDURE — 80048 BASIC METABOLIC PNL TOTAL CA: CPT | Performed by: STUDENT IN AN ORGANIZED HEALTH CARE EDUCATION/TRAINING PROGRAM

## 2024-03-28 PROCEDURE — 2500000004 HC RX 250 GENERAL PHARMACY W/ HCPCS (ALT 636 FOR OP/ED)

## 2024-03-28 PROCEDURE — 85027 COMPLETE CBC AUTOMATED: CPT | Performed by: STUDENT IN AN ORGANIZED HEALTH CARE EDUCATION/TRAINING PROGRAM

## 2024-03-28 PROCEDURE — 2500000001 HC RX 250 WO HCPCS SELF ADMINISTERED DRUGS (ALT 637 FOR MEDICARE OP)

## 2024-03-28 PROCEDURE — 86901 BLOOD TYPING SEROLOGIC RH(D): CPT | Performed by: STUDENT IN AN ORGANIZED HEALTH CARE EDUCATION/TRAINING PROGRAM

## 2024-03-28 PROCEDURE — 83010 ASSAY OF HAPTOGLOBIN QUANT: CPT | Performed by: STUDENT IN AN ORGANIZED HEALTH CARE EDUCATION/TRAINING PROGRAM

## 2024-03-28 PROCEDURE — 36415 COLL VENOUS BLD VENIPUNCTURE: CPT | Performed by: STUDENT IN AN ORGANIZED HEALTH CARE EDUCATION/TRAINING PROGRAM

## 2024-03-28 PROCEDURE — 83615 LACTATE (LD) (LDH) ENZYME: CPT | Performed by: STUDENT IN AN ORGANIZED HEALTH CARE EDUCATION/TRAINING PROGRAM

## 2024-03-28 PROCEDURE — 85045 AUTOMATED RETICULOCYTE COUNT: CPT | Performed by: STUDENT IN AN ORGANIZED HEALTH CARE EDUCATION/TRAINING PROGRAM

## 2024-03-28 PROCEDURE — 86920 COMPATIBILITY TEST SPIN: CPT

## 2024-03-28 PROCEDURE — 2500000004 HC RX 250 GENERAL PHARMACY W/ HCPCS (ALT 636 FOR OP/ED): Performed by: STUDENT IN AN ORGANIZED HEALTH CARE EDUCATION/TRAINING PROGRAM

## 2024-03-28 PROCEDURE — 99232 SBSQ HOSP IP/OBS MODERATE 35: CPT | Performed by: STUDENT IN AN ORGANIZED HEALTH CARE EDUCATION/TRAINING PROGRAM

## 2024-03-28 PROCEDURE — 1100000001 HC PRIVATE ROOM DAILY

## 2024-03-28 PROCEDURE — 2500000001 HC RX 250 WO HCPCS SELF ADMINISTERED DRUGS (ALT 637 FOR MEDICARE OP): Performed by: STUDENT IN AN ORGANIZED HEALTH CARE EDUCATION/TRAINING PROGRAM

## 2024-03-28 RX ORDER — ACETAMINOPHEN 325 MG/1
650 TABLET ORAL EVERY 4 HOURS PRN
Status: DISCONTINUED | OUTPATIENT
Start: 2024-03-28 | End: 2024-03-30 | Stop reason: HOSPADM

## 2024-03-28 RX ORDER — ACETAMINOPHEN 160 MG/5ML
650 SOLUTION ORAL EVERY 4 HOURS PRN
Status: DISCONTINUED | OUTPATIENT
Start: 2024-03-28 | End: 2024-03-30 | Stop reason: HOSPADM

## 2024-03-28 RX ORDER — ACETAMINOPHEN 650 MG/1
650 SUPPOSITORY RECTAL EVERY 4 HOURS PRN
Status: DISCONTINUED | OUTPATIENT
Start: 2024-03-28 | End: 2024-03-30 | Stop reason: HOSPADM

## 2024-03-28 RX ORDER — FOLIC ACID 1 MG/1
1 TABLET ORAL DAILY
Status: DISCONTINUED | OUTPATIENT
Start: 2024-03-28 | End: 2024-03-28

## 2024-03-28 RX ADMIN — GABAPENTIN 300 MG: 300 CAPSULE ORAL at 20:36

## 2024-03-28 RX ADMIN — METOPROLOL TARTRATE 50 MG: 50 TABLET, FILM COATED ORAL at 08:09

## 2024-03-28 RX ADMIN — METOPROLOL TARTRATE 50 MG: 50 TABLET, FILM COATED ORAL at 20:36

## 2024-03-28 RX ADMIN — MAGNESIUM OXIDE 400 MG (241.3 MG MAGNESIUM) TABLET 400 MG: TABLET at 20:36

## 2024-03-28 RX ADMIN — PANTOPRAZOLE SODIUM 40 MG: 40 TABLET, DELAYED RELEASE ORAL at 05:30

## 2024-03-28 RX ADMIN — MAGNESIUM OXIDE 400 MG (241.3 MG MAGNESIUM) TABLET 400 MG: TABLET at 08:09

## 2024-03-28 RX ADMIN — CYANOCOBALAMIN TAB 500 MCG 1000 MCG: 500 TAB at 08:09

## 2024-03-28 RX ADMIN — FOLIC ACID 1 MG: 1 TABLET ORAL at 08:09

## 2024-03-28 RX ADMIN — MONTELUKAST SODIUM 10 MG: 10 TABLET, FILM COATED ORAL at 20:36

## 2024-03-28 RX ADMIN — BUPROPION HYDROCHLORIDE 150 MG: 150 TABLET, EXTENDED RELEASE ORAL at 08:09

## 2024-03-28 RX ADMIN — Medication 1 TABLET: at 08:09

## 2024-03-28 RX ADMIN — ENOXAPARIN SODIUM 40 MG: 100 INJECTION SUBCUTANEOUS at 08:09

## 2024-03-28 RX ADMIN — ACETAMINOPHEN 650 MG: 325 SOLUTION ORAL at 16:03

## 2024-03-28 RX ADMIN — POLYETHYLENE GLYCOL 3350 17 G: 17 POWDER, FOR SOLUTION ORAL at 08:09

## 2024-03-28 RX ADMIN — Medication 100 MG: at 08:09

## 2024-03-28 ASSESSMENT — COGNITIVE AND FUNCTIONAL STATUS - GENERAL
MOVING TO AND FROM BED TO CHAIR: A LITTLE
PERSONAL GROOMING: A LITTLE
MOBILITY SCORE: 17
MOVING TO AND FROM BED TO CHAIR: A LITTLE
CLIMB 3 TO 5 STEPS WITH RAILING: A LOT
HELP NEEDED FOR BATHING: A LOT
DRESSING REGULAR LOWER BODY CLOTHING: A LOT
DRESSING REGULAR UPPER BODY CLOTHING: A LITTLE
HELP NEEDED FOR BATHING: A LITTLE
DRESSING REGULAR LOWER BODY CLOTHING: A LITTLE
TOILETING: A LITTLE
EATING MEALS: A LITTLE
MOBILITY SCORE: 16
CLIMB 3 TO 5 STEPS WITH RAILING: TOTAL
WALKING IN HOSPITAL ROOM: A LITTLE
STANDING UP FROM CHAIR USING ARMS: A LITTLE
MOVING FROM LYING ON BACK TO SITTING ON SIDE OF FLAT BED WITH BEDRAILS: A LITTLE
PERSONAL GROOMING: A LITTLE
DAILY ACTIVITIY SCORE: 15
TURNING FROM BACK TO SIDE WHILE IN FLAT BAD: A LITTLE
WALKING IN HOSPITAL ROOM: A LOT
DRESSING REGULAR UPPER BODY CLOTHING: A LITTLE
STANDING UP FROM CHAIR USING ARMS: A LITTLE
DAILY ACTIVITIY SCORE: 19
TURNING FROM BACK TO SIDE WHILE IN FLAT BAD: A LITTLE
TOILETING: A LOT

## 2024-03-28 ASSESSMENT — LIFESTYLE VARIABLES
TREMOR: NO TREMOR
HEADACHE, FULLNESS IN HEAD: NOT PRESENT
NAUSEA AND VOMITING: NO NAUSEA AND NO VOMITING
AUDITORY DISTURBANCES: NOT PRESENT
TOTAL SCORE: 0
ANXIETY: NO ANXIETY, AT EASE
ANXIETY: NO ANXIETY, AT EASE
VISUAL DISTURBANCES: NOT PRESENT
VISUAL DISTURBANCES: NOT PRESENT
NAUSEA AND VOMITING: NO NAUSEA AND NO VOMITING
PAROXYSMAL SWEATS: NO SWEAT VISIBLE
AUDITORY DISTURBANCES: NOT PRESENT
ORIENTATION AND CLOUDING OF SENSORIUM: ORIENTED AND CAN DO SERIAL ADDITIONS
AGITATION: NORMAL ACTIVITY
ORIENTATION AND CLOUDING OF SENSORIUM: ORIENTED AND CAN DO SERIAL ADDITIONS
TOTAL SCORE: 0
PAROXYSMAL SWEATS: NO SWEAT VISIBLE
TREMOR: NO TREMOR
HEADACHE, FULLNESS IN HEAD: NOT PRESENT
AGITATION: NORMAL ACTIVITY

## 2024-03-28 ASSESSMENT — PAIN - FUNCTIONAL ASSESSMENT
PAIN_FUNCTIONAL_ASSESSMENT: 0-10
PAIN_FUNCTIONAL_ASSESSMENT: 0-10

## 2024-03-28 ASSESSMENT — PAIN SCALES - GENERAL
PAINLEVEL_OUTOF10: 0 - NO PAIN
PAINLEVEL_OUTOF10: 0 - NO PAIN

## 2024-03-28 NOTE — PROGRESS NOTES
Medical Group Progress Note  ASSESSMENT & PLAN:     Generalized weakness  Frequent falls  - PT/OT  - Weakness is multifactorial, likely in setting of deconditioning along with age as well as social situation  - Will need placement on discharge  - Workup unremarkable for acute findings    Anemia of chronic disease  - Hemoglobin 7.0 today (baseline 10)  - No signs of acute bleeding  - Transfuse if hemoglobin <7.0  - Iron studies, folate/B12 reviewed  - Will rule out hemolysis - follow up haptoglobin, reticulocyte, LFTs, LDH    Esophageal dysmotility  - SLP following  - [place] [facility] [place]   - Outpatient follow up for EGD    Essential hypertension  Peripheral neuropathy  COPD, not in exacerbation  - Per nursing staff spoke with pharmacy patient has not refilled any recent medications  - Have discussed with staff who will be reaching out to PCP office    Hypokalemia  - Replace as needed    Alcohol use disorder  - Unclear exact history  - CIWA protocol  - no needs for ativan yet, will likely stop 3/28  - Added on multivitamin, folic acid, and thiamine supplementation    VTE prophylaxis: Enoxaparin subcutaneous    Disposition/Daily update: Hemoglobin continues to drop at 7 today no signs of bleeding therefore will rule out hemolysis.  Follow-up labs as per above.  Awaiting pre-CERT.      ---Of note, this documentation is completed using the Dragon Dictation system (voice recognition software). There may be spelling and/or grammatical errors that were not corrected prior to final submission.---    Marshal Banks MD    SUBJECTIVE     Patient was seen and examined bedside this morning.  No acute events overnight.  No complaints for me this morning.  Hemoglobin is dropping however.  No signs of bleeding.      OBJECTIVE:     Last Recorded Vitals:  Vitals:    03/27/24 2007 03/27/24 2334 03/28/24 0814 03/28/24 1452   BP: 119/71 110/68 107/69 99/60   BP Location: Left arm Left arm Left arm Left arm   Patient Position: Lying  Lying Lying Lying   Pulse: 96 83 92 86   Resp: 16 17 18 18   Temp: 37.6 °C (99.7 °F) 37.8 °C (100 °F) 36.6 °C (97.9 °F) 38.6 °C (101.5 °F)   TempSrc: Temporal Temporal Temporal    SpO2: 100% 96% 100% 100%   Weight:       Height:         Last I/O:  I/O last 3 completed shifts:  In: 1553.8 (29.8 mL/kg) [P.O.:100; I.V.:1453.8 (27.9 mL/kg)]  Out: 1900 (36.4 mL/kg) [Urine:1900 (1 mL/kg/hr)]  Weight: 52.2 kg     Physical Exam  Vitals reviewed.   Constitutional:       General: She is not in acute distress.     Appearance: Normal appearance. She is not toxic-appearing.   HENT:      Head: Normocephalic and atraumatic.   Eyes:      Extraocular Movements: Extraocular movements intact.      Conjunctiva/sclera: Conjunctivae normal.   Cardiovascular:      Rate and Rhythm: Normal rate and regular rhythm.      Pulses: Normal pulses.      Heart sounds: Normal heart sounds.   Pulmonary:      Effort: Pulmonary effort is normal. No respiratory distress.      Breath sounds: Normal breath sounds. No wheezing.   Abdominal:      General: Bowel sounds are normal.      Palpations: Abdomen is soft.      Tenderness: There is no abdominal tenderness. There is no guarding.   Musculoskeletal:         General: Normal range of motion.      Cervical back: Normal range of motion and neck supple.   Neurological:      General: No focal deficit present.      Mental Status: She is alert and oriented to person, place, and time. Mental status is at baseline.     Inpatient Medications:  buPROPion XL, 150 mg, oral, Daily  cyanocobalamin, 1,000 mcg, oral, Daily  enoxaparin, 40 mg, subcutaneous, q24h  folic acid, 1 mg, oral, Daily  gabapentin, 300 mg, oral, Nightly  magnesium oxide, 400 mg, oral, BID  metoprolol tartrate, 50 mg, oral, BID  mirtazapine, 15 mg, oral, Nightly  montelukast, 10 mg, oral, Nightly  multivitamin with minerals, 1 tablet, oral, Daily  pantoprazole, 40 mg, oral, Daily   Or  pantoprazole, 40 mg, intravenous, Daily  polyethylene glycol, 17  g, oral, Daily  thiamine, 100 mg, oral, Daily    PRN Medications  PRN medications: acetaminophen **OR** acetaminophen **OR** acetaminophen, diclofenac sodium, LORazepam **OR** LORazepam **OR** LORazepam, lubricating eye drops, ondansetron ODT **OR** ondansetron  Continuous Medications:       LABS AND IMAGING:     Labs:  Results from last 7 days   Lab Units 03/28/24 0527 03/27/24 0418 03/26/24  0629   WBC AUTO x10*3/uL 6.2 5.6 6.3   RBC AUTO x10*6/uL 2.02* 2.27* 2.43*   HEMOGLOBIN g/dL 7.0* 7.9* 8.7*   HEMATOCRIT % 21.3* 23.3* 24.5*   MCV fL 105* 103* 101*   MCH pg 34.7* 34.8* 35.8*   MCHC g/dL 32.9 33.9 35.5   RDW % 16.2* 15.9* 15.8*   PLATELETS AUTO x10*3/uL 320 348 372       Results from last 7 days   Lab Units 03/28/24 0527 03/27/24 0417 03/26/24  0629 03/25/24  2136   SODIUM mmol/L 140 138 134* 133*   POTASSIUM mmol/L 4.0 3.8 3.3* 4.0   CHLORIDE mmol/L 106 106 95* 90*   CO2 mmol/L 27 24 20* 20*   BUN mg/dL 17 14 24* 25*   CREATININE mg/dL 0.48* 0.57 0.60 0.79   GLUCOSE mg/dL 95 123* 79 89   PROTEIN TOTAL g/dL  --   --   --  7.8   CALCIUM mg/dL 8.9 8.9 9.1 10.4*   BILIRUBIN TOTAL mg/dL  --   --   --  0.6   ALK PHOS U/L  --   --   --  87   AST U/L  --   --   --  45*   ALT U/L  --   --   --  21       Results from last 7 days   Lab Units 03/27/24 0417 03/25/24  2136   MAGNESIUM mg/dL 1.46* 1.59*       Results from last 7 days   Lab Units 03/25/24 2237 03/25/24  2136   TROPHS ng/L 4 4       Imaging:  FL modified barium swallow study  Narrative: Interpreted By:  Carter Terry and Krakowski Christina   STUDY:  FL MODIFIED BARIUM SWALLOW STUDY;; 3/27/2024 11:50 am      INDICATION:  Signs/Symptoms:assess swallow.      COMPARISON:  None.      ACCESSION NUMBER(S):  [id number]      ORDERING CLINICIAN:  BABITA BROTHERS      TECHNIQUE:  MBSS completed. Informed verbal consent obtained prior to completion  of exam. Trials of thin, nectar thick, puree, soft-solids, and  regular solids given. Fluoroscopy time :  4  minutes 34 seconds.      SLP: Corina South M.A., CCC-SLP  Phone/Pager: [phone]      SPEECH FINDINGS:  Reason for referral: Globus sensation.  Patient hx: HTN, COPD, neuropathy, hypokalemia. Pt. Required an  increased processing time to respond to SLP. Respiratory status: Room  air Previous diet: Regular/thin      FINAL SPEECH RECOMMENDATIONS      Diet recommendations/feeding strategies: REGULAR/THIN- add moisture  to dry solids, alternate bites/sips, upright 90 degrees for intake      Follow-up speech therapy recommended:  x1-2 sessions to ensure  tolerance of diet and use of strategies      Short term goals: Pt to tolerate regular/thin liquid diet without  pulmonary compromise Pt. To independently use safe swallow strategies  to increase esophageal clearance and decrease risk of aspiration  during intake      Education provided: Yes educated on results and recommendations      Treatment Provided today: No      Additional consult suggested: Possible GI if globus complaints  continue with use of strategies      Repeat study/ dc plan: Repeat study as clinically indicated      Mechanics of the swallow summary:  *Oral phase: Slowed oral clearance and oral residue post solids.  *Pharyngeal phase: Pharyngeal phase of swallow did not start until  bolus head reached pyriform sinuses however did not impact integrity  of swallow. Laryngeal penetration x1 trial of thin liquids. No  aspiration present during study. Minimally decreased cricopharyngeal  opening. Trace vallecular residue post swallow. Cleared with  consecutive swallows. *Esophageal phase: Slowed clearance of bolus  and backflow of bolus noted      SLP impressions with severity rating: Minimal pharyngeal dysphagia  and esophageal dysfunction      Thin Liquids (MBSS)  Rosenbek's Penetration Aspiration Scale, Thin Liquids (MBSS):  1. NO ASPIRATION & NO PENETRATION - no aspiration, contrast does  not enter airway Laryngeal penetration x1 of 4 trials       Nectar Thick Liquids (MBSS)  Rosenbek's Penetration Aspiration Scale, Nectar thick liquids (MBSS):  1. NO ASPIRATION & NO PENETRATION - no aspiration, contrast does  not enter airway              Purees (MBSS)  Rosenbek's Penetration Aspiration Scale, Purees (MBSS):  1. NO ASPIRATION & NO PENETRATION - no aspiration, contrast does  not enter airway      Solids (MBSS)  Rosenbek's Penetration Aspiration Scale, Solids (MBSS):  1. NO ASPIRATION & NO PENETRATION - no aspiration, contrast does  not enter airway      Speech Therapy section of this report signed by  Corina South M.A., CCC-SLP on 3/27/2024 at 1:13 pm.      RADIOLOGY FINDINGS:  No laryngeal penetration or aspiration was observed with the tested  consistencies.      Radiology section of this report signed by Dr. Terry.      Impression: No observed laryngeal penetration or aspiration.      Please see speech pathology section of the report for additional  details and recommendations.      MACRO:  None      Signed by: Carter Terry 3/27/2024 2:05 PM  Dictation workstation:   [id number]

## 2024-03-29 LAB
ABO GROUP (TYPE) IN BLOOD: NORMAL
ANION GAP SERPL CALC-SCNC: 9 MMOL/L (ref 10–20)
BACTERIA UR CULT: ABNORMAL
BASOPHILS # BLD MANUAL: 0 X10*3/UL (ref 0–0.1)
BASOPHILS NFR BLD MANUAL: 0 %
BLOOD EXPIRATION DATE: NORMAL
BUN SERPL-MCNC: 19 MG/DL (ref 6–23)
CALCIUM SERPL-MCNC: 8.6 MG/DL (ref 8.6–10.3)
CHLORIDE SERPL-SCNC: 105 MMOL/L (ref 98–107)
CO2 SERPL-SCNC: 29 MMOL/L (ref 21–32)
CREAT SERPL-MCNC: 0.54 MG/DL (ref 0.5–1.05)
DISPENSE STATUS: NORMAL
EGFRCR SERPLBLD CKD-EPI 2021: >90 ML/MIN/1.73M*2
EOSINOPHIL # BLD MANUAL: 0.15 X10*3/UL (ref 0–0.7)
EOSINOPHIL NFR BLD MANUAL: 2 %
ERYTHROCYTE [DISTWIDTH] IN BLOOD BY AUTOMATED COUNT: 16.9 % (ref 11.5–14.5)
GLUCOSE SERPL-MCNC: 96 MG/DL (ref 74–99)
HAPTOGLOB SERPL-MCNC: 230 MG/DL (ref 37–246)
HCT VFR BLD AUTO: 21 % (ref 36–46)
HGB BLD-MCNC: 6.9 G/DL (ref 12–16)
HYPOCHROMIA BLD QL SMEAR: NORMAL
IMM GRANULOCYTES # BLD AUTO: 0.43 X10*3/UL (ref 0–0.7)
IMM GRANULOCYTES NFR BLD AUTO: 5.8 % (ref 0–0.9)
LYMPHOCYTES # BLD MANUAL: 3.3 X10*3/UL (ref 1.2–4.8)
LYMPHOCYTES NFR BLD MANUAL: 44 %
MCH RBC QN AUTO: 35 PG (ref 26–34)
MCHC RBC AUTO-ENTMCNC: 32.9 G/DL (ref 32–36)
MCV RBC AUTO: 107 FL (ref 80–100)
METAMYELOCYTES # BLD MANUAL: 0.15 X10*3/UL
METAMYELOCYTES NFR BLD MANUAL: 2 %
MONOCYTES # BLD MANUAL: 0.3 X10*3/UL (ref 0.1–1)
MONOCYTES NFR BLD MANUAL: 4 %
MYELOCYTES # BLD MANUAL: 0.08 X10*3/UL
MYELOCYTES NFR BLD MANUAL: 1 %
NEUTS SEG # BLD MANUAL: 3.53 X10*3/UL (ref 1.2–7)
NEUTS SEG NFR BLD MANUAL: 47 %
NRBC BLD-RTO: 0.5 /100 WBCS (ref 0–0)
PLATELET # BLD AUTO: 302 X10*3/UL (ref 150–450)
POTASSIUM SERPL-SCNC: 4.1 MMOL/L (ref 3.5–5.3)
PRODUCT BLOOD TYPE: 6200
PRODUCT CODE: NORMAL
RBC # BLD AUTO: 1.97 X10*6/UL (ref 4–5.2)
RBC MORPH BLD: NORMAL
RH FACTOR (ANTIGEN D): NORMAL
SODIUM SERPL-SCNC: 139 MMOL/L (ref 136–145)
TARGETS BLD QL SMEAR: NORMAL
TOTAL CELLS COUNTED BLD: 100
UNIT ABO: NORMAL
UNIT NUMBER: NORMAL
UNIT RH: NORMAL
UNIT VOLUME: 350
WBC # BLD AUTO: 7.5 X10*3/UL (ref 4.4–11.3)
XM INTEP: NORMAL

## 2024-03-29 PROCEDURE — 2500000001 HC RX 250 WO HCPCS SELF ADMINISTERED DRUGS (ALT 637 FOR MEDICARE OP)

## 2024-03-29 PROCEDURE — 85027 COMPLETE CBC AUTOMATED: CPT | Performed by: STUDENT IN AN ORGANIZED HEALTH CARE EDUCATION/TRAINING PROGRAM

## 2024-03-29 PROCEDURE — 36415 COLL VENOUS BLD VENIPUNCTURE: CPT | Performed by: STUDENT IN AN ORGANIZED HEALTH CARE EDUCATION/TRAINING PROGRAM

## 2024-03-29 PROCEDURE — 2500000004 HC RX 250 GENERAL PHARMACY W/ HCPCS (ALT 636 FOR OP/ED)

## 2024-03-29 PROCEDURE — 97530 THERAPEUTIC ACTIVITIES: CPT | Mod: GP,CQ

## 2024-03-29 PROCEDURE — 2500000004 HC RX 250 GENERAL PHARMACY W/ HCPCS (ALT 636 FOR OP/ED): Performed by: STUDENT IN AN ORGANIZED HEALTH CARE EDUCATION/TRAINING PROGRAM

## 2024-03-29 PROCEDURE — 85007 BL SMEAR W/DIFF WBC COUNT: CPT | Performed by: STUDENT IN AN ORGANIZED HEALTH CARE EDUCATION/TRAINING PROGRAM

## 2024-03-29 PROCEDURE — 36430 TRANSFUSION BLD/BLD COMPNT: CPT

## 2024-03-29 PROCEDURE — 99232 SBSQ HOSP IP/OBS MODERATE 35: CPT | Performed by: STUDENT IN AN ORGANIZED HEALTH CARE EDUCATION/TRAINING PROGRAM

## 2024-03-29 PROCEDURE — 97530 THERAPEUTIC ACTIVITIES: CPT | Mod: GO,CO

## 2024-03-29 PROCEDURE — 97535 SELF CARE MNGMENT TRAINING: CPT | Mod: GO,CO

## 2024-03-29 PROCEDURE — 82374 ASSAY BLOOD CARBON DIOXIDE: CPT | Performed by: STUDENT IN AN ORGANIZED HEALTH CARE EDUCATION/TRAINING PROGRAM

## 2024-03-29 PROCEDURE — P9016 RBC LEUKOCYTES REDUCED: HCPCS

## 2024-03-29 PROCEDURE — 97116 GAIT TRAINING THERAPY: CPT | Mod: GP,CQ

## 2024-03-29 PROCEDURE — 1100000001 HC PRIVATE ROOM DAILY

## 2024-03-29 PROCEDURE — 2500000001 HC RX 250 WO HCPCS SELF ADMINISTERED DRUGS (ALT 637 FOR MEDICARE OP): Performed by: STUDENT IN AN ORGANIZED HEALTH CARE EDUCATION/TRAINING PROGRAM

## 2024-03-29 RX ADMIN — Medication 100 MG: at 10:13

## 2024-03-29 RX ADMIN — BUPROPION HYDROCHLORIDE 150 MG: 150 TABLET, EXTENDED RELEASE ORAL at 10:13

## 2024-03-29 RX ADMIN — METOPROLOL TARTRATE 50 MG: 50 TABLET, FILM COATED ORAL at 21:08

## 2024-03-29 RX ADMIN — METOPROLOL TARTRATE 50 MG: 50 TABLET, FILM COATED ORAL at 10:13

## 2024-03-29 RX ADMIN — CYANOCOBALAMIN TAB 500 MCG 1000 MCG: 500 TAB at 10:13

## 2024-03-29 RX ADMIN — ENOXAPARIN SODIUM 40 MG: 100 INJECTION SUBCUTANEOUS at 01:50

## 2024-03-29 RX ADMIN — ACETAMINOPHEN 650 MG: 325 TABLET ORAL at 01:51

## 2024-03-29 RX ADMIN — PANTOPRAZOLE SODIUM 40 MG: 40 TABLET, DELAYED RELEASE ORAL at 06:35

## 2024-03-29 RX ADMIN — MONTELUKAST SODIUM 10 MG: 10 TABLET, FILM COATED ORAL at 21:08

## 2024-03-29 RX ADMIN — FOLIC ACID 1 MG: 1 TABLET ORAL at 10:13

## 2024-03-29 RX ADMIN — MAGNESIUM OXIDE 400 MG (241.3 MG MAGNESIUM) TABLET 400 MG: TABLET at 10:13

## 2024-03-29 RX ADMIN — POLYETHYLENE GLYCOL 3350 17 G: 17 POWDER, FOR SOLUTION ORAL at 10:14

## 2024-03-29 RX ADMIN — GABAPENTIN 300 MG: 300 CAPSULE ORAL at 21:08

## 2024-03-29 RX ADMIN — ENOXAPARIN SODIUM 40 MG: 100 INJECTION SUBCUTANEOUS at 21:08

## 2024-03-29 RX ADMIN — Medication 1 TABLET: at 10:14

## 2024-03-29 RX ADMIN — MAGNESIUM OXIDE 400 MG (241.3 MG MAGNESIUM) TABLET 400 MG: TABLET at 21:08

## 2024-03-29 RX ADMIN — MIRTAZAPINE 15 MG: 15 TABLET, FILM COATED ORAL at 21:08

## 2024-03-29 ASSESSMENT — LIFESTYLE VARIABLES
AUDITORY DISTURBANCES: NOT PRESENT
HEADACHE, FULLNESS IN HEAD: NOT PRESENT
ANXIETY: NO ANXIETY, AT EASE
ORIENTATION AND CLOUDING OF SENSORIUM: ORIENTED AND CAN DO SERIAL ADDITIONS
HEADACHE, FULLNESS IN HEAD: NOT PRESENT
AUDITORY DISTURBANCES: NOT PRESENT
AGITATION: NORMAL ACTIVITY
VISUAL DISTURBANCES: NOT PRESENT
TREMOR: NO TREMOR
PAROXYSMAL SWEATS: NO SWEAT VISIBLE
NAUSEA AND VOMITING: NO NAUSEA AND NO VOMITING
TREMOR: NO TREMOR
ORIENTATION AND CLOUDING OF SENSORIUM: ORIENTED AND CAN DO SERIAL ADDITIONS
ANXIETY: NO ANXIETY, AT EASE
TOTAL SCORE: 0
PAROXYSMAL SWEATS: NO SWEAT VISIBLE
VISUAL DISTURBANCES: NOT PRESENT
AGITATION: NORMAL ACTIVITY
TOTAL SCORE: 0
NAUSEA AND VOMITING: NO NAUSEA AND NO VOMITING

## 2024-03-29 ASSESSMENT — PAIN DESCRIPTION - LOCATION: LOCATION: FOOT

## 2024-03-29 ASSESSMENT — COGNITIVE AND FUNCTIONAL STATUS - GENERAL
DRESSING REGULAR UPPER BODY CLOTHING: A LITTLE
STANDING UP FROM CHAIR USING ARMS: A LITTLE
PERSONAL GROOMING: A LOT
MOVING TO AND FROM BED TO CHAIR: A LITTLE
MOVING FROM LYING ON BACK TO SITTING ON SIDE OF FLAT BED WITH BEDRAILS: A LITTLE
DRESSING REGULAR LOWER BODY CLOTHING: A LOT
DAILY ACTIVITIY SCORE: 15
TOILETING: A LOT
TURNING FROM BACK TO SIDE WHILE IN FLAT BAD: A LITTLE
WALKING IN HOSPITAL ROOM: A LITTLE
MOBILITY SCORE: 17
CLIMB 3 TO 5 STEPS WITH RAILING: A LOT
HELP NEEDED FOR BATHING: A LOT

## 2024-03-29 ASSESSMENT — PAIN SCALES - GENERAL
PAINLEVEL_OUTOF10: 7
PAINLEVEL_OUTOF10: 7
PAINLEVEL_OUTOF10: 6

## 2024-03-29 ASSESSMENT — PAIN - FUNCTIONAL ASSESSMENT
PAIN_FUNCTIONAL_ASSESSMENT: 0-10

## 2024-03-29 ASSESSMENT — PAIN DESCRIPTION - ORIENTATION: ORIENTATION: RIGHT;LEFT

## 2024-03-29 ASSESSMENT — ACTIVITIES OF DAILY LIVING (ADL): HOME_MANAGEMENT_TIME_ENTRY: 12

## 2024-03-29 NOTE — PROGRESS NOTES
Per [facility] they are still awaiting  ins. Precert.  Requested ins. Precert escalation with hospital ins. Team this aAmandam.

## 2024-03-29 NOTE — PROGRESS NOTES
Physical Therapy    Physical Therapy Treatment    Patient Name: Jolanta Castaneda  MRN: [id number]  Today's Date: 3/29/2024  Time Calculation  Start Time: 0848  Stop Time: 0911  Time Calculation (min): 23 min       Assessment/Plan   End of Session Communication: Bedside nurse  Assessment Comment: Patient presents with good effort throghout todays session. Call light and all needs in reach.  End of Session Patient Position: Bed, 3 rail up, Alarm off, caregiver present      General Visit Information:   PT  Visit  PT Received On: 03/29/24  General  Prior to Session Communication: Bedside nurse  Patient Position Received: Bed, 3 rail up, Alarm off, not on at start of session  General Comment: Pt agreeable to therapy this date, would like to utilize restroom  Subjective     Precautions:  Precautions  Medical Precautions: Fall precautions       Objective     Pain:  Pain Assessment  Pain Assessment: 0-10  Pain Score: 7  Pain Type: Neuropathic pain  Pain Location: Foot  Pain Orientation: Right, Left         Treatments:        Balance/Neuromuscular Re-Education  Balance/Neuromuscular Re-Education Activity Performed: Yes  Balance/Neuromuscular Re-Education Activity 2: Pt performed standing reaching activities with FWW  to challenge weight shifting outside of OLGA LIDIA. Pt demonstrates ability to perform task without LOB or instability CGA for safety purposes with single UE support. Quick fatigue, performs slow and controlled.  Bed Mobility  Bed Mobility: Yes  Bed Mobility 1  Bed Mobility 1: Supine to sitting  Level of Assistance 1: Minimum assistance  Bed Mobility Comments 1: Pt performed supine<>EOB Silas SUP with HOB elevate ~35 degrees. Use of bedrails to lift trunk. Pt demonstrates ability to walk BLE off edge of bed with increased time to complete.  Bed Mobility 2  Bed Mobility  2: Sitting to supine  Level of Assistance 2: Minimum assistance  Bed Mobility Comments 2: Pt performed EOB<>supine Silas SUP with HOB elevate ~35 degrees.  Able to lift LEs onto bed and UEs to lower trunk with increased time to complete due to neurological pain in LEs.  Ambulation/Gait Training  Ambulation/Gait Training Performed: Yes  Ambulation/Gait Training 1  Surface 1: Level tile  Device 1: Rolling walker  Assistance 1: Minimum assistance  Quality of Gait 1: Decreased step length  Comments/Distance (ft) 1: Pt ambulated 20' x2 with FWW, Silas. Pt demonstrates NBOS with short stride length. VC to correct. Fair AD management with VC to correct.  Transfers  Transfer: Yes  Transfer 1  Transfer From 1: Stand to  Transfer to 1: Toilet  Technique 1: Stand pivot  Transfer Device 1: Walker (FWW)  Transfer Level of Assistance 1: Moderate assistance  Trials/Comments 1: Pt performed stand pivot from FWW<>toilet ModA due to lower surface, VC for sequencing and AD management.  Transfers 2  Transfer From 2: Bed to  Transfer to 2: Stand  Technique 2: Stand to sit, Sit to stand  Transfer Device 2: Walker (FWW)  Transfer Level of Assistance 2: Moderate assistance  Trials/Comments 2: Pt performed STS from EOB<>FWW ModA with VC required for sequencing.          Outcome Measures:  [facility] Basic Mobility  Turning from your back to your side while in a flat bed without using bedrails: A little  Moving from lying on your back to sitting on the side of a flat bed without using bedrails: A little  Moving to and from bed to chair (including a wheelchair): A little  Standing up from a chair using your arms (e.g. wheelchair or bedside chair): A little  To walk in hospital room: A little  Climbing 3-5 steps with railing: A lot  Basic Mobility - Total Score: 17  Education Documentation  Mobility Training, taught by Mervat Liu PTA at 3/29/2024  1:40 PM.  Learner: Patient  Readiness: Acceptance  Method: Explanation, Demonstration  Response: Verbalizes Understanding, Needs Reinforcement    Education Comments  No comments found.        EDUCATION:     Encounter Problems       Encounter Problems  (Active)       PT Problem       Pt will demonstrate mod I  with bed mobility to edge of bed.   (Progressing)       Start:  03/26/24    Expected End:  04/09/24            Pt will demonstrate mod I  with sit to stand/chair transfers with FWW.   (Progressing)       Start:  03/26/24    Expected End:  04/09/24            Pt will ambulate 50 feet with FWW SUP.   (Progressing)       Start:  03/26/24    Expected End:  04/09/24            Pt to demo improved BLE strength by being able to complete supine/seated thera ex 2x20 BLEs with 4 or less rest breaks .   (Not Progressing)       Start:  03/26/24    Expected End:  04/09/24               Pain - Adult

## 2024-03-29 NOTE — PROGRESS NOTES
Medical Group Progress Note  ASSESSMENT & PLAN:     Generalized weakness  Frequent falls  - PT/OT  - Weakness is multifactorial, likely in setting of deconditioning along with age as well as social situation  - Will need placement on discharge  - Workup unremarkable for acute findings    Anemia of chronic disease  - Hemoglobin 6.9 today (baseline 10) - 1u PRBC transfusion 3/29  - No signs of acute bleeding and hemolysis work up negative  - Transfuse if hemoglobin <7.0  - Iron studies, folate/B12, folate. LDH reviewed - haptoglobin pending    Esophageal dysmotility  - SLP following  - Seen on [facility] today   - Outpatient follow up for EGD    Essential hypertension  Peripheral neuropathy  COPD, not in exacerbation  - Per nursing staff spoke with pharmacy patient has not refilled any recent medications  - Have discussed with staff who will be reaching out to PCP office    Hypokalemia  - Replace as needed    Alcohol use disorder  - CIWA stopped 3/29 - no signs of withdrawal  - Continue multivitamin, folic acid, and thiamine supplementation    VTE prophylaxis: Enoxaparin subcutaneous    Disposition/Daily update: Received 1 unit PRBCs today with hemoglobin of 6.9 otherwise workup unremarkable, no bleeding and hemolysis.  Awaiting pre-CERT for discharge to SNF.  Otherwise CIWA protocol completed, did not require any oral Ativan over the last 48 hours.      ---Of note, this documentation is completed using the Dragon Dictation system (voice recognition software). There may be spelling and/or grammatical errors that were not corrected prior to final submission.---    Marshal Banks MD    SUBJECTIVE     Patient was seen and examined bedside this morning where hemoglobin dropped to 6.9 and did consent for transfusion.  No blood loss noticed, hemolysis labs unremarkable as well.  Aware of pending pre-CERT.     OBJECTIVE:     Last Recorded Vitals:  Vitals:    03/29/24 1145 03/29/24 1200 03/29/24 1215 03/29/24 1315   BP: 92/60  112/65 108/55 105/64   BP Location:       Patient Position:       Pulse: 83 84 81 82   Resp: 18 20 20 18   Temp: 36.6 °C (97.9 °F) 37 °C (98.6 °F) 37.4 °C (99.3 °F) 37 °C (98.6 °F)   TempSrc: Temporal Temporal Temporal Temporal   SpO2:       Weight:       Height:         Last I/O:  I/O last 3 completed shifts:  In: 200 (3.8 mL/kg) [P.O.:100; I.V.:100 (1.9 mL/kg)]  Out: 850 (16.3 mL/kg) [Urine:850 (0.5 mL/kg/hr)]  Weight: 52.2 kg     Physical Exam  Vitals reviewed.   Constitutional:       General: She is not in acute distress.     Appearance: Normal appearance. She is not toxic-appearing.   HENT:      Head: Normocephalic and atraumatic.   Eyes:      Extraocular Movements: Extraocular movements intact.      Conjunctiva/sclera: Conjunctivae normal.   Cardiovascular:      Rate and Rhythm: Normal rate and regular rhythm.      Pulses: Normal pulses.      Heart sounds: Normal heart sounds.   Pulmonary:      Effort: Pulmonary effort is normal. No respiratory distress.      Breath sounds: Normal breath sounds. No wheezing.   Abdominal:      General: Bowel sounds are normal.      Palpations: Abdomen is soft.      Tenderness: There is no abdominal tenderness. There is no guarding.   Musculoskeletal:         General: Normal range of motion.      Cervical back: Normal range of motion and neck supple.   Neurological:      General: No focal deficit present.      Mental Status: She is alert and oriented to person, place, and time. Mental status is at baseline.     Inpatient Medications:  buPROPion XL, 150 mg, oral, Daily  cyanocobalamin, 1,000 mcg, oral, Daily  enoxaparin, 40 mg, subcutaneous, q24h  folic acid, 1 mg, oral, Daily  gabapentin, 300 mg, oral, Nightly  magnesium oxide, 400 mg, oral, BID  metoprolol tartrate, 50 mg, oral, BID  mirtazapine, 15 mg, oral, Nightly  montelukast, 10 mg, oral, Nightly  multivitamin with minerals, 1 tablet, oral, Daily  pantoprazole, 40 mg, oral, Daily   Or  pantoprazole, 40 mg, intravenous,  Daily  polyethylene glycol, 17 g, oral, Daily  thiamine, 100 mg, oral, Daily    PRN Medications  PRN medications: acetaminophen **OR** acetaminophen **OR** acetaminophen, diclofenac sodium, LORazepam **OR** LORazepam **OR** LORazepam, lubricating eye drops, ondansetron ODT **OR** ondansetron  Continuous Medications:       LABS AND IMAGING:     Labs:  Results from last 7 days   Lab Units 03/29/24  0532 03/28/24 0527 03/27/24 0418   WBC AUTO x10*3/uL 7.5 6.2 5.6   RBC AUTO x10*6/uL 1.97* 2.02* 2.27*   HEMOGLOBIN g/dL 6.9* 7.0* 7.9*   HEMATOCRIT % 21.0* 21.3* 23.3*   MCV fL 107* 105* 103*   MCH pg 35.0* 34.7* 34.8*   MCHC g/dL 32.9 32.9 33.9   RDW % 16.9* 16.2* 15.9*   PLATELETS AUTO x10*3/uL 302 320 348       Results from last 7 days   Lab Units 03/29/24  0532 03/28/24  0527 03/27/24 0417 03/26/24  0629 03/25/24  2136   SODIUM mmol/L 139 140 138   < > 133*   POTASSIUM mmol/L 4.1 4.0 3.8   < > 4.0   CHLORIDE mmol/L 105 106 106   < > 90*   CO2 mmol/L 29 27 24   < > 20*   BUN mg/dL 19 17 14   < > 25*   CREATININE mg/dL 0.54 0.48* 0.57   < > 0.79   GLUCOSE mg/dL 96 95 123*   < > 89   PROTEIN TOTAL g/dL  --   --   --   --  7.8   CALCIUM mg/dL 8.6 8.9 8.9   < > 10.4*   BILIRUBIN TOTAL mg/dL  --   --   --   --  0.6   ALK PHOS U/L  --   --   --   --  87   AST U/L  --   --   --   --  45*   ALT U/L  --   --   --   --  21    < > = values in this interval not displayed.       Results from last 7 days   Lab Units 03/27/24 0417 03/25/24  2136   MAGNESIUM mg/dL 1.46* 1.59*       Results from last 7 days   Lab Units 03/25/24 2237 03/25/24  2136   TROPHS ng/L 4 4       Imaging:  FL modified barium swallow study  Narrative: Interpreted By:  Carter Terry and Krakowski Christina   STUDY:  FL MODIFIED BARIUM SWALLOW STUDY;; 3/27/2024 11:50 am      INDICATION:  Signs/Symptoms:assess swallow.      COMPARISON:  None.      ACCESSION NUMBER(S):  [id number]      ORDERING CLINICIAN:  BABITA BROTHERS      TECHNIQUE:  MBSS completed.  Informed verbal consent obtained prior to completion  of exam. Trials of thin, nectar thick, puree, soft-solids, and  regular solids given. Fluoroscopy time :  4 minutes 34 seconds.      SLP: Corina South M.A. CCC-SLP  Phone/Pager: [phone]      SPEECH FINDINGS:  Reason for referral: Globus sensation.  Patient hx: HTN, COPD, neuropathy, hypokalemia. Pt. Required an  increased processing time to respond to SLP. Respiratory status: Room  air Previous diet: Regular/thin      FINAL SPEECH RECOMMENDATIONS      Diet recommendations/feeding strategies: REGULAR/THIN- add moisture  to dry solids, alternate bites/sips, upright 90 degrees for intake      Follow-up speech therapy recommended:  x1-2 sessions to ensure  tolerance of diet and use of strategies      Short term goals: Pt to tolerate regular/thin liquid diet without  pulmonary compromise Pt. To independently use safe swallow strategies  to increase esophageal clearance and decrease risk of aspiration  during intake      Education provided: Yes educated on results and recommendations      Treatment Provided today: No      Additional consult suggested: Possible GI if globus complaints  continue with use of strategies      Repeat study/ dc plan: Repeat study as clinically indicated      Mechanics of the swallow summary:  *Oral phase: Slowed oral clearance and oral residue post solids.  *Pharyngeal phase: Pharyngeal phase of swallow did not start until  bolus head reached pyriform sinuses however did not impact integrity  of swallow. Laryngeal penetration x1 trial of thin liquids. No  aspiration present during study. Minimally decreased cricopharyngeal  opening. Trace vallecular residue post swallow. Cleared with  consecutive swallows. *Esophageal phase: Slowed clearance of bolus  and backflow of bolus noted      SLP impressions with severity rating: Minimal pharyngeal dysphagia  and esophageal dysfunction      Thin Liquids (MBSS)  Rosenbek's Penetration  Aspiration Scale, Thin Liquids (MBSS):  1. NO ASPIRATION & NO PENETRATION - no aspiration, contrast does  not enter airway Laryngeal penetration x1 of 4 trials      Nectar Thick Liquids (MBSS)  Rosenbek's Penetration Aspiration Scale, Nectar thick liquids (MBSS):  1. NO ASPIRATION & NO PENETRATION - no aspiration, contrast does  not enter airway              Purees (MBSS)  Rosenbek's Penetration Aspiration Scale, Purees (MBSS):  1. NO ASPIRATION & NO PENETRATION - no aspiration, contrast does  not enter airway      Solids (MBSS)  Rosenbek's Penetration Aspiration Scale, Solids (MBSS):  1. NO ASPIRATION & NO PENETRATION - no aspiration, contrast does  not enter airway      Speech Therapy section of this report signed by  Corina South M.A., CCC-SLP on 3/27/2024 at 1:13 pm.      RADIOLOGY FINDINGS:  No laryngeal penetration or aspiration was observed with the tested  consistencies.      Radiology section of this report signed by Dr. Terry.      Impression: No observed laryngeal penetration or aspiration.      Please see speech pathology section of the report for additional  details and recommendations.      MACRO:  None      Signed by: Carter Terry 3/27/2024 2:05 PM  Dictation workstation:   [id number]

## 2024-03-29 NOTE — PROGRESS NOTES
Occupational Therapy    OT Treatment    Patient Name: Jolanta Castaneda  MRN: [id number]  Today's Date: 3/29/2024  Time Calculation  Start Time: 1055  Stop Time: 1120  Time Calculation (min): 25 min       Assessment:  End of Session Communication: Bedside nurse  End of Session Patient Position: Bed, 2 rail up, Alarm on     Plan:  Treatment Interventions: ADL retraining, Functional transfer training, UE strengthening/ROM, Endurance training, Patient/family training  OT Frequency: 2 times per week  Treatment Interventions: ADL retraining, Functional transfer training, UE strengthening/ROM, Endurance training, Patient/family training    Subjective   Previous Visit Info:  OT Last Visit  OT Received On: 03/29/24  General:  General  Prior to Session Communication: Bedside nurse  Patient Position Received: Bed, 2 rail up, Alarm on  General Comment: Cleared for therapy by nursing, patient will receive blood later this am.  Precautions:  Medical Precautions: Fall precautions     Pain:  Pain Assessment  Pain Assessment: 0-10  Pain Score: 7  Pain Type: Chronic pain  Pain Location:  (bilateral lower legs)    Objective    Cognition:  Cognition  Overall Cognitive Status: Impaired  Orientation Level: Oriented X4  Following Commands: Follows multistep commands with repetition  Insight: Moderate  Impulsive: Mildly     Activities of Daily Living: Grooming  Grooming Level of Assistance:  (hand hygiene while in stance at sink with SBA with 1 verbal cue for walker safety/placement)  Grooming Where Assessed: Standing sinkside    LE Dressing  Pants Level of Assistance:  (pants mgmt up/down over hips- min a)    Toileting  Toileting Level of Assistance: Contact guard (weight shifting)  Where Assessed: Toilet  Functional Standing Tolerance:  Functional Standing Tolerance Comments: patient stood for a total of 2 mins this date with fair/fair- balance with 2-1 ue support at all times during functional ambulation/transfers and ADL tasks with 1  LOB  Bed Mobility/Transfers: Bed Mobility  Bed Mobility: Yes  Bed Mobility 1  Bed Mobility 1: Supine to sitting  Level of Assistance 1: Contact guard  Bed Mobility 2  Bed Mobility  2: Sitting to supine  Level of Assistance 2: Minimum assistance    Transfer 1  Technique 1: Sit to stand  Transfer Device 1: Walker  Transfer Level of Assistance 1: Moderate assistance, Moderate verbal cues, Moderate tactile cues (for hand placement and safety)  Transfers 2  Transfer Device 2: Walker  Transfer Level of Assistance 2: Minimum assistance, Moderate tactile cues (for walker mgmt)     Sitting Balance:  Dynamic Sitting Balance  Dynamic Sitting-Balance:  (fair+)  Standing Balance:  Dynamic Standing Balance  Dynamic Standing-Balance:  (fair/fair-)    Outcome Measures:[facility] Daily Activity  Putting on and taking off regular lower body clothing: A lot  Bathing (including washing, rinsing, drying): A lot  Putting on and taking off regular upper body clothing: A little  Toileting, which includes using toilet, bedpan or urinal: A lot  Taking care of personal grooming such as brushing teeth: A lot  Eating Meals: None  Daily Activity - Total Score: 15    Education Documentation  ADL Training, taught by JAHAIRA Hernandes at 3/29/2024 12:51 PM.  Learner: Patient  Readiness: Acceptance  Method: Explanation  Response: Needs Reinforcement    OP EDUCATION:  Education  Individual(s) Educated: Patient  Education Provided: Symptom management, Ergonomics and postural realignment, Joint protection and energy conservation, Fall precautons, Risk and benefits of OT discussed with patient or other, POC discussed and agreed upon  Equipment:  (EC tech, home DME)  Risk and Benefits Discussed with Patient/Caregiver/Other: yes  Patient/Caregiver Demonstrated Understanding: yes  Plan of Care Discussed and Agreed Upon: yes  Patient Response to Education: Patient/Caregiver Verbalized Understanding of Information    Goals:  Encounter Problems       Encounter  Problems (Active)       OT Goals       Patient will complete household distance functional mobility with a FWW at SBA level.  (Progressing)       Start:  03/26/24    Expected End:  04/09/24            Patient will complete functional transfers with a FWW at SBA level.  (Progressing)       Start:  03/26/24    Expected End:  04/09/24            Patient will demonstrate fair + dynamic standing balance during functional tasks. (Progressing)       Start:  03/26/24    Expected End:  04/09/24            Patient will complete lower body dressing at a SBA level.  (Progressing)       Start:  03/26/24    Expected End:  04/09/24            Patient will tolerate standing greater than 4 minutes during functional tasks. (Progressing)       Start:  03/26/24    Expected End:  04/09/24

## 2024-03-30 VITALS
WEIGHT: 115 LBS | SYSTOLIC BLOOD PRESSURE: 103 MMHG | BODY MASS INDEX: 17.03 KG/M2 | DIASTOLIC BLOOD PRESSURE: 58 MMHG | HEART RATE: 90 BPM | TEMPERATURE: 99.5 F | OXYGEN SATURATION: 96 % | HEIGHT: 69 IN | RESPIRATION RATE: 16 BRPM

## 2024-03-30 LAB
ANION GAP SERPL CALC-SCNC: 13 MMOL/L (ref 10–20)
BASOPHILS # BLD MANUAL: 0 X10*3/UL (ref 0–0.1)
BASOPHILS NFR BLD MANUAL: 0 %
BUN SERPL-MCNC: 18 MG/DL (ref 6–23)
CALCIUM SERPL-MCNC: 9.4 MG/DL (ref 8.6–10.3)
CHLORIDE SERPL-SCNC: 101 MMOL/L (ref 98–107)
CO2 SERPL-SCNC: 27 MMOL/L (ref 21–32)
CREAT SERPL-MCNC: 0.46 MG/DL (ref 0.5–1.05)
EGFRCR SERPLBLD CKD-EPI 2021: >90 ML/MIN/1.73M*2
EOSINOPHIL # BLD MANUAL: 0.2 X10*3/UL (ref 0–0.7)
EOSINOPHIL NFR BLD MANUAL: 2 %
ERYTHROCYTE [DISTWIDTH] IN BLOOD BY AUTOMATED COUNT: ABNORMAL %
GLUCOSE SERPL-MCNC: 140 MG/DL (ref 74–99)
HCT VFR BLD AUTO: 27.1 % (ref 36–46)
HGB BLD-MCNC: 9.1 G/DL (ref 12–16)
HOLD SPECIMEN: NORMAL
IMM GRANULOCYTES # BLD AUTO: 0.63 X10*3/UL (ref 0–0.7)
IMM GRANULOCYTES NFR BLD AUTO: 6.3 % (ref 0–0.9)
LYMPHOCYTES # BLD MANUAL: 3 X10*3/UL (ref 1.2–4.8)
LYMPHOCYTES NFR BLD MANUAL: 30 %
MCH RBC QN AUTO: 31.9 PG (ref 26–34)
MCHC RBC AUTO-ENTMCNC: 33.6 G/DL (ref 32–36)
MCV RBC AUTO: 95 FL (ref 80–100)
MONOCYTES # BLD MANUAL: 0.6 X10*3/UL (ref 0.1–1)
MONOCYTES NFR BLD MANUAL: 6 %
MYELOCYTES # BLD MANUAL: 0.4 X10*3/UL
MYELOCYTES NFR BLD MANUAL: 4 %
NEUTROPHILS # BLD MANUAL: 5.5 X10*3/UL (ref 1.2–7.7)
NEUTS BAND # BLD MANUAL: 0.2 X10*3/UL (ref 0–0.7)
NEUTS BAND NFR BLD MANUAL: 2 %
NEUTS SEG # BLD MANUAL: 5.3 X10*3/UL (ref 1.2–7)
NEUTS SEG NFR BLD MANUAL: 53 %
NRBC BLD-RTO: 0.6 /100 WBCS (ref 0–0)
OVALOCYTES BLD QL SMEAR: NORMAL
PLATELET # BLD AUTO: 303 X10*3/UL (ref 150–450)
POLYCHROMASIA BLD QL SMEAR: NORMAL
POTASSIUM SERPL-SCNC: 4.1 MMOL/L (ref 3.5–5.3)
RBC # BLD AUTO: 2.85 X10*6/UL (ref 4–5.2)
RBC MORPH BLD: NORMAL
SODIUM SERPL-SCNC: 137 MMOL/L (ref 136–145)
TARGETS BLD QL SMEAR: NORMAL
TOTAL CELLS COUNTED BLD: 100
VARIANT LYMPHS # BLD MANUAL: 0.3 X10*3/UL (ref 0–0.5)
VARIANT LYMPHS NFR BLD: 3 %
WBC # BLD AUTO: 10 X10*3/UL (ref 4.4–11.3)

## 2024-03-30 PROCEDURE — 85007 BL SMEAR W/DIFF WBC COUNT: CPT | Performed by: STUDENT IN AN ORGANIZED HEALTH CARE EDUCATION/TRAINING PROGRAM

## 2024-03-30 PROCEDURE — 80048 BASIC METABOLIC PNL TOTAL CA: CPT | Performed by: STUDENT IN AN ORGANIZED HEALTH CARE EDUCATION/TRAINING PROGRAM

## 2024-03-30 PROCEDURE — 99239 HOSP IP/OBS DSCHRG MGMT >30: CPT | Performed by: STUDENT IN AN ORGANIZED HEALTH CARE EDUCATION/TRAINING PROGRAM

## 2024-03-30 PROCEDURE — 2500000001 HC RX 250 WO HCPCS SELF ADMINISTERED DRUGS (ALT 637 FOR MEDICARE OP)

## 2024-03-30 PROCEDURE — 2500000004 HC RX 250 GENERAL PHARMACY W/ HCPCS (ALT 636 FOR OP/ED)

## 2024-03-30 PROCEDURE — 92526 ORAL FUNCTION THERAPY: CPT | Mod: GN | Performed by: SPEECH-LANGUAGE PATHOLOGIST

## 2024-03-30 PROCEDURE — 85027 COMPLETE CBC AUTOMATED: CPT | Performed by: STUDENT IN AN ORGANIZED HEALTH CARE EDUCATION/TRAINING PROGRAM

## 2024-03-30 PROCEDURE — 2500000004 HC RX 250 GENERAL PHARMACY W/ HCPCS (ALT 636 FOR OP/ED): Performed by: STUDENT IN AN ORGANIZED HEALTH CARE EDUCATION/TRAINING PROGRAM

## 2024-03-30 PROCEDURE — 2500000001 HC RX 250 WO HCPCS SELF ADMINISTERED DRUGS (ALT 637 FOR MEDICARE OP): Performed by: STUDENT IN AN ORGANIZED HEALTH CARE EDUCATION/TRAINING PROGRAM

## 2024-03-30 PROCEDURE — 36415 COLL VENOUS BLD VENIPUNCTURE: CPT | Performed by: STUDENT IN AN ORGANIZED HEALTH CARE EDUCATION/TRAINING PROGRAM

## 2024-03-30 RX ADMIN — Medication 1 TABLET: at 08:42

## 2024-03-30 RX ADMIN — FOLIC ACID 1 MG: 1 TABLET ORAL at 08:42

## 2024-03-30 RX ADMIN — MAGNESIUM OXIDE 400 MG (241.3 MG MAGNESIUM) TABLET 400 MG: TABLET at 08:42

## 2024-03-30 RX ADMIN — BUPROPION HYDROCHLORIDE 150 MG: 150 TABLET, EXTENDED RELEASE ORAL at 08:42

## 2024-03-30 RX ADMIN — Medication 100 MG: at 08:42

## 2024-03-30 RX ADMIN — CYANOCOBALAMIN TAB 500 MCG 1000 MCG: 500 TAB at 08:42

## 2024-03-30 RX ADMIN — PANTOPRAZOLE SODIUM 40 MG: 40 TABLET, DELAYED RELEASE ORAL at 06:06

## 2024-03-30 RX ADMIN — POLYETHYLENE GLYCOL 3350 17 G: 17 POWDER, FOR SOLUTION ORAL at 08:41

## 2024-03-30 RX ADMIN — METOPROLOL TARTRATE 50 MG: 50 TABLET, FILM COATED ORAL at 08:41

## 2024-03-30 ASSESSMENT — COGNITIVE AND FUNCTIONAL STATUS - GENERAL
CLIMB 3 TO 5 STEPS WITH RAILING: A LOT
MOBILITY SCORE: 17
TOILETING: A LOT
WALKING IN HOSPITAL ROOM: A LITTLE
DRESSING REGULAR UPPER BODY CLOTHING: A LITTLE
HELP NEEDED FOR BATHING: A LOT
TURNING FROM BACK TO SIDE WHILE IN FLAT BAD: A LITTLE
PERSONAL GROOMING: A LOT
DRESSING REGULAR LOWER BODY CLOTHING: A LOT
STANDING UP FROM CHAIR USING ARMS: A LITTLE
DAILY ACTIVITIY SCORE: 15
MOVING FROM LYING ON BACK TO SITTING ON SIDE OF FLAT BED WITH BEDRAILS: A LITTLE
MOVING TO AND FROM BED TO CHAIR: A LITTLE

## 2024-03-30 ASSESSMENT — PAIN SCALES - GENERAL: PAINLEVEL_OUTOF10: 0 - NO PAIN

## 2024-03-30 NOTE — PROGRESS NOTES
03/30/24 1325   Current Planned Discharge Disposition   Current Planned Discharge Disposition SNF  ([facility])     Pt medically cleared to DC to [facility]. Facility notified and final orders sent. 7000 previously completed in HENS. Pt requests that transportation be arranged for after 6PM so that family can be available at time of transfer. Transport confirmed for 6:15 by cot. Pt notified and agreeable to DC plan. RN updated and report number provided [phone].

## 2024-03-30 NOTE — PROGRESS NOTES
Speech-Language Pathology    SLP Adult Inpatient  Speech-Language Pathology Treatment     Patient Name: Jolanta Castaneda  MRN: [id number]  Today's Date: 3/30/2024  Time Calculation  Start Time: 0700  Stop Time: 0725  Time Calculation (min): 25 min         Current Problem:   1. Generalized weakness        2. Frequent falls        3. Weight loss        4. Nausea vomiting and diarrhea        5. Hypomagnesemia        6. Generalized abdominal pain        7. Alcohol use disorder  folic acid (Folvite) 1 mg tablet    multivitamin with minerals tablet    thiamine (Vitamin B-1) 100 mg tablet      8. Dry eyes  lubricating eye drops ophthalmic solution            SLP Assessment:  Pt seen in pt's room at bedside with PO intake of regular diet textures and thin liquids. Pt instructed in use of the following compensatory strategies for safe swallow: small bites/sips, alternating bites/sips, reduced rate of intake, and upright positioning. Pt reported understanding. Pt demonstrated use of aforementioned compensatory strategies with 80% accuracy given min verbal cues. Pt hesitant to comply with cues for small bites at this time. Pt demonstrated tolerance of thin liquids via cup sips WFL with no overt s/s aspiration/penetration. Pt demonstrated tolerance of regular diet textures characterized by cough x7. Pt instructed in use of effortful swallow and throat clear/reswallow in order to facilitate pt safety with PO intake.     PLAN:  Skilled speech therapy for dysphagia treatment continues to be warranted to provide training and instruction regarding the use of compensatory swallow strategies, and for pt/caregiver education in order to reduce risk of aspiration, dehydration and malnutrition.  SLP TX Plan: Continue Plan of Care  SLP Frequency: 1 follow-up session  Discussed POC: Patient  Discussed Risks/Benefits: Patient  Patient/Caregiver Agreeable: Yes  Inpatient/Swing Bed or Outpatient: Inpatient    Goals:   Pt to tolerate regular/thin  liquid diet without pulmonary compromise   Pt. To independently use safe swallow strategies to increase esophageal clearance and decrease risk of aspiration during intake      Subjective:  Pt. seen at bedside for dysphagia treatment.  Nursing reports no significant change in status.  Patient was agreeable to tx and cooperative.   Prior to Session Communication: Nurse    Objective:  Pain:  Pain Assessment  Pain Assessment: 0-10  Pain Score: 2  Oxygen Status:   Room Air    General Visit Information:  Pt. Admitted with increased weakness, weight loss   Past medical history: HTN, COPD, neuropathy, hypokalemia   Reason for Referral: dysphagia  Current Diet: Regular/Thin     Recommendations:   Solid Diet Recommendations: Regular  Liquid Diet Recommendations: Thin  Compensatory strategies: small bites/sips, alternating bites/sips, reduced rate of intake, and upright positioning  Medication administration: whole with liquid wash    Education:  Pt educated on the following compensatory strategies for safety with PO intake: small bites/sips, alternating bites/sips, reduced rate of intake, and upright positioning. Pt instructed in use of effortful swallow and throat clear/reswallow in order to facilitate pt safety with PO intake. Pt reported understanding.

## 2024-03-30 NOTE — DISCHARGE SUMMARY
Medical Group Discharge Summary  DISCHARGE DIAGNOSIS     Denies weakness  Frequent falls  Acute on chronic anemia  Esophageal dysmotility    HOSPITAL COURSE AND DETAILS     This is a 59-year-old female with a significant past medical history of hypertension, COPD, peripheral neuropathy, malnutrition that presented from home with worsening increased weakness, frequent falls, weight loss, nausea, vomiting.    Patient did have an acute drop in hemoglobin with no episodes of bleeding requiring 1 unit PRBCs.  Hemolysis workup was also negative.  Was seen by speech therapy during hospitalization and there are concerns for some esophageal dysmotility which will need outpatient workup with EGD.  Hemoglobin improved with transfusion.  Patient work with PT/OT and was deemed a candidate for SNF.  Pre-CERT was received today 3/30 and patient is being discharged to SNF for further care.    Patient will need follow-up with her primary care physician in the next 1 to 2 weeks after discharge from rehab to discuss hospitalization.  She will also require GI follow-up to discuss endoscopy and assessment of her esophageal dysmotility down the road.    Total time spent on discharge: >30min      ---Of note, this documentation is completed using the Dragon Dictation system (voice recognition software). There may be spelling and/or grammatical errors that were not corrected prior to final submission.---    Marshal Banks MD    DISCHARGE PHYSICAL EXAM     Last Recorded Vitals:  Vitals:    03/29/24 1430 03/29/24 1934 03/29/24 2359 03/30/24 0806   BP: 98/61 117/70 102/62 109/61   BP Location:       Patient Position:       Pulse: 85 89 81    Resp: 16      Temp: 37.5 °C (99.5 °F) 36.7 °C (98.1 °F) 36.4 °C (97.5 °F) 37.5 °C (99.5 °F)   TempSrc: Temporal      SpO2:  100% 97% 96%   Weight:       Height:           Physical Exam  Vitals reviewed.   Constitutional:       General: She is not in acute distress.     Appearance: Normal appearance.  She is not toxic-appearing.   HENT:      Head: Normocephalic and atraumatic.   Eyes:      Extraocular Movements: Extraocular movements intact.      Conjunctiva/sclera: Conjunctivae normal.   Cardiovascular:      Rate and Rhythm: Normal rate and regular rhythm.      Pulses: Normal pulses.      Heart sounds: Normal heart sounds.   Pulmonary:      Effort: Pulmonary effort is normal. No respiratory distress.      Breath sounds: Normal breath sounds. No wheezing.   Abdominal:      General: Bowel sounds are normal.      Palpations: Abdomen is soft.      Tenderness: There is no abdominal tenderness. There is no guarding.   Musculoskeletal:         General: Normal range of motion.      Cervical back: Normal range of motion and neck supple.   Neurological:      General: No focal deficit present.      Mental Status: She is alert and oriented to person, place, and time. Mental status is at baseline.           DISCHARGE MEDICATIONS        Your medication list        START taking these medications        Instructions Last Dose Given Next Dose Due   folic acid 1 mg tablet  Commonly known as: Folvite      Take 1 tablet (1 mg) by mouth once daily. Do not start before March 28, 2024.       lubricating eye drops ophthalmic solution      Administer 1 drop into both eyes 2 times a day as needed for dry eyes.       multivitamin with minerals tablet      Take 1 tablet by mouth once daily. Do not start before March 28, 2024.       thiamine 100 mg tablet  Commonly known as: Vitamin B-1      Take 1 tablet (100 mg) by mouth once daily. Do not start before March 28, 2024.              CONTINUE taking these medications        Instructions Last Dose Given Next Dose Due   buPROPion  mg 24 hr tablet  Commonly known as: Wellbutrin XL           cyanocobalamin 1,000 mcg tablet  Commonly known as: Vitamin B-12           Voltaren 1 % gel  Generic drug: diclofenac sodium           gabapentin 300 mg capsule  Commonly known as: Neurontin            magnesium oxide 400 mg (241.3 mg magnesium) tablet  Commonly known as: Mag-Ox           metoprolol tartrate 50 mg tablet  Commonly known as: Lopressor           mirtazapine 15 mg tablet  Commonly known as: Remeron           montelukast 10 mg tablet  Commonly known as: Singulair           pantoprazole 40 mg EC tablet  Commonly known as: ProtoNix                     Where to Get Your Medications        Information about where to get these medications is not yet available    Ask your nurse or doctor about these medications  folic acid 1 mg tablet  lubricating eye drops ophthalmic solution  multivitamin with minerals tablet  thiamine 100 mg tablet           OUTPATIENT FOLLOW-UP     No future appointments.

## 2024-04-03 ENCOUNTER — OFFICE VISIT (OUTPATIENT)
Dept: GERIATRIC MEDICINE | Age: 59
End: 2024-04-03
Payer: MEDICAID

## 2024-04-03 DIAGNOSIS — R29.6 FREQUENT FALLS: ICD-10-CM

## 2024-04-03 DIAGNOSIS — I10 PRIMARY HYPERTENSION: ICD-10-CM

## 2024-04-03 DIAGNOSIS — E46 MALNUTRITION, UNSPECIFIED TYPE (HCC): ICD-10-CM

## 2024-04-03 DIAGNOSIS — K25.9 GASTRIC ULCER, UNSPECIFIED CHRONICITY, UNSPECIFIED WHETHER GASTRIC ULCER HEMORRHAGE OR PERFORATION PRESENT: ICD-10-CM

## 2024-04-03 DIAGNOSIS — J44.9 CHRONIC OBSTRUCTIVE PULMONARY DISEASE, UNSPECIFIED COPD TYPE (HCC): ICD-10-CM

## 2024-04-03 DIAGNOSIS — G62.1 ALCOHOL-INDUCED POLYNEUROPATHY (HCC): ICD-10-CM

## 2024-04-03 DIAGNOSIS — E05.90 HYPERTHYROIDISM: ICD-10-CM

## 2024-04-03 DIAGNOSIS — R53.1 GENERALIZED WEAKNESS: Primary | ICD-10-CM

## 2024-04-03 PROCEDURE — 99310 SBSQ NF CARE HIGH MDM 45: CPT | Performed by: NURSE PRACTITIONER

## 2024-04-04 LAB
ANION GAP SERPL CALCULATED.3IONS-SCNC: 11 MEQ/L (ref 9–15)
ANISOCYTOSIS BLD QL SMEAR: ABNORMAL
BASOPHILS # BLD: 0.3 K/UL (ref 0–0.2)
BASOPHILS NFR BLD: 4 %
BUN SERPL-MCNC: 15 MG/DL (ref 6–20)
CALCIUM SERPL-MCNC: 9.8 MG/DL (ref 8.5–9.9)
CHLORIDE SERPL-SCNC: 104 MEQ/L (ref 95–107)
CO2 SERPL-SCNC: 24 MEQ/L (ref 20–31)
CREAT SERPL-MCNC: 0.55 MG/DL (ref 0.5–0.9)
EOSINOPHIL # BLD: 0.1 K/UL (ref 0–0.7)
EOSINOPHIL NFR BLD: 1 %
ERYTHROCYTE [DISTWIDTH] IN BLOOD BY AUTOMATED COUNT: 22.5 % (ref 11.5–14.5)
GLUCOSE SERPL-MCNC: 85 MG/DL (ref 70–99)
HCT VFR BLD AUTO: 28.1 % (ref 37–47)
HGB BLD-MCNC: 9.1 G/DL (ref 12–16)
LYMPHOCYTES # BLD: 2.1 K/UL (ref 1–4.8)
LYMPHOCYTES NFR BLD: 29 %
MCH RBC QN AUTO: 31.3 PG (ref 27–31.3)
MCHC RBC AUTO-ENTMCNC: 32.4 % (ref 33–37)
MCV RBC AUTO: 96.6 FL (ref 79.4–94.8)
MONOCYTES # BLD: 0.4 K/UL (ref 0.2–0.8)
MONOCYTES NFR BLD: 5.8 %
MYELOCYTES NFR BLD MANUAL: 1 %
NEUTROPHILS # BLD: 4.3 K/UL (ref 1.4–6.5)
NEUTS BAND NFR BLD MANUAL: 1 % (ref 5–11)
NEUTS SEG NFR BLD: 58 %
PLATELET # BLD AUTO: 407 K/UL (ref 130–400)
PLATELET BLD QL SMEAR: ABNORMAL
POIKILOCYTOSIS BLD QL SMEAR: ABNORMAL
POTASSIUM SERPL-SCNC: 4.7 MEQ/L (ref 3.4–4.9)
RBC # BLD AUTO: 2.91 M/UL (ref 4.2–5.4)
SODIUM SERPL-SCNC: 139 MEQ/L (ref 135–144)
TARGETS BLD QL SMEAR: ABNORMAL
VITAMIN D 25-HYDROXY: 23.4 NG/ML (ref 30–100)
WBC # BLD AUTO: 7.1 K/UL (ref 4.8–10.8)

## 2024-04-04 RX ORDER — CALCIUM CARBONATE 300MG(750)
1 TABLET,CHEWABLE ORAL 2 TIMES DAILY
COMMUNITY

## 2024-04-04 RX ORDER — POLYETHYLENE GLYCOL 3350 17 G/17G
17 POWDER, FOR SOLUTION ORAL DAILY
COMMUNITY

## 2024-04-04 RX ORDER — FOLIC ACID 1 MG/1
1 TABLET ORAL DAILY
COMMUNITY

## 2024-04-04 RX ORDER — CHOLECALCIFEROL (VITAMIN D3) 125 MCG
1000 CAPSULE ORAL DAILY
COMMUNITY

## 2024-04-04 RX ORDER — MIRTAZAPINE 15 MG/1
15 TABLET, FILM COATED ORAL DAILY
COMMUNITY

## 2024-04-04 RX ORDER — ACETAMINOPHEN 325 MG/1
650 TABLET ORAL EVERY 4 HOURS PRN
COMMUNITY

## 2024-04-04 RX ORDER — POTASSIUM CHLORIDE 1.5 G/1.58G
40 POWDER, FOR SOLUTION ORAL DAILY
COMMUNITY

## 2024-04-04 RX ORDER — PANTOPRAZOLE SODIUM 40 MG/1
40 TABLET, DELAYED RELEASE ORAL DAILY
COMMUNITY

## 2024-04-04 RX ORDER — METOPROLOL TARTRATE 50 MG/1
50 TABLET, FILM COATED ORAL 2 TIMES DAILY
COMMUNITY

## 2024-04-04 RX ORDER — MONTELUKAST SODIUM 10 MG/1
10 TABLET ORAL NIGHTLY
COMMUNITY

## 2024-04-04 RX ORDER — BUPROPION HYDROCHLORIDE 150 MG/1
150 TABLET ORAL EVERY MORNING
COMMUNITY

## 2024-04-04 RX ORDER — GABAPENTIN 300 MG/1
300 CAPSULE ORAL DAILY
COMMUNITY

## 2024-04-05 ENCOUNTER — OFFICE VISIT (OUTPATIENT)
Dept: GERIATRIC MEDICINE | Age: 59
End: 2024-04-05

## 2024-04-05 DIAGNOSIS — J42 CHRONIC BRONCHITIS, UNSPECIFIED CHRONIC BRONCHITIS TYPE (HCC): Primary | ICD-10-CM

## 2024-04-05 DIAGNOSIS — E44.0 MODERATE PROTEIN-CALORIE MALNUTRITION (HCC): ICD-10-CM

## 2024-04-05 DIAGNOSIS — G62.1 ALCOHOL-INDUCED POLYNEUROPATHY (HCC): ICD-10-CM

## 2024-04-05 DIAGNOSIS — I10 PRIMARY HYPERTENSION: ICD-10-CM

## 2024-04-09 ENCOUNTER — OFFICE VISIT (OUTPATIENT)
Dept: GERIATRIC MEDICINE | Age: 59
End: 2024-04-09

## 2024-04-09 DIAGNOSIS — G62.1 ALCOHOL-INDUCED POLYNEUROPATHY (HCC): ICD-10-CM

## 2024-04-09 DIAGNOSIS — J42 CHRONIC BRONCHITIS, UNSPECIFIED CHRONIC BRONCHITIS TYPE (HCC): Primary | ICD-10-CM

## 2024-04-09 DIAGNOSIS — I10 PRIMARY HYPERTENSION: ICD-10-CM

## 2024-04-11 ENCOUNTER — OFFICE VISIT (OUTPATIENT)
Dept: GERIATRIC MEDICINE | Age: 59
End: 2024-04-11

## 2024-04-11 DIAGNOSIS — R29.6 FREQUENT FALLS: Primary | ICD-10-CM

## 2024-04-11 DIAGNOSIS — L30.9 DERMATITIS: ICD-10-CM

## 2024-04-17 ENCOUNTER — OFFICE VISIT (OUTPATIENT)
Dept: GERIATRIC MEDICINE | Age: 59
End: 2024-04-17

## 2024-04-17 DIAGNOSIS — I10 PRIMARY HYPERTENSION: ICD-10-CM

## 2024-04-17 DIAGNOSIS — J42 CHRONIC BRONCHITIS, UNSPECIFIED CHRONIC BRONCHITIS TYPE (HCC): Primary | ICD-10-CM

## 2024-04-17 DIAGNOSIS — E44.0 MODERATE PROTEIN-CALORIE MALNUTRITION (HCC): ICD-10-CM

## 2024-04-19 ENCOUNTER — OFFICE VISIT (OUTPATIENT)
Dept: GERIATRIC MEDICINE | Age: 59
End: 2024-04-19

## 2024-04-19 DIAGNOSIS — R60.0 EDEMA OF BOTH LOWER EXTREMITIES: Primary | ICD-10-CM

## 2024-04-23 PROBLEM — E46 MALNUTRITION (HCC): Status: ACTIVE | Noted: 2024-04-23

## 2024-04-23 PROBLEM — G62.1 ALCOHOL-INDUCED POLYNEUROPATHY (HCC): Status: ACTIVE | Noted: 2024-04-23

## 2024-04-23 PROBLEM — K25.9 GASTRIC ULCER: Status: ACTIVE | Noted: 2024-04-23

## 2024-04-23 PROBLEM — R53.1 GENERALIZED WEAKNESS: Status: ACTIVE | Noted: 2024-04-23

## 2024-04-23 PROBLEM — R29.6 FREQUENT FALLS: Status: ACTIVE | Noted: 2024-04-23

## 2024-04-23 PROBLEM — J44.9 CHRONIC OBSTRUCTIVE PULMONARY DISEASE (HCC): Status: ACTIVE | Noted: 2024-04-23

## 2024-04-23 PROBLEM — I10 PRIMARY HYPERTENSION: Status: ACTIVE | Noted: 2024-04-23

## 2024-04-24 ENCOUNTER — OFFICE VISIT (OUTPATIENT)
Dept: GERIATRIC MEDICINE | Age: 59
End: 2024-04-24
Payer: MEDICAID

## 2024-04-24 DIAGNOSIS — M54.50 LOW BACK PAIN, UNSPECIFIED BACK PAIN LATERALITY, UNSPECIFIED CHRONICITY, UNSPECIFIED WHETHER SCIATICA PRESENT: Primary | ICD-10-CM

## 2024-04-24 PROCEDURE — 99308 SBSQ NF CARE LOW MDM 20: CPT | Performed by: NURSE PRACTITIONER

## 2024-04-25 ENCOUNTER — OFFICE VISIT (OUTPATIENT)
Dept: GERIATRIC MEDICINE | Age: 59
End: 2024-04-25

## 2024-04-25 DIAGNOSIS — J42 CHRONIC BRONCHITIS, UNSPECIFIED CHRONIC BRONCHITIS TYPE (HCC): Primary | ICD-10-CM

## 2024-04-25 DIAGNOSIS — I48.0 PAROXYSMAL ATRIAL FIBRILLATION (HCC): ICD-10-CM

## 2024-04-25 DIAGNOSIS — I10 PRIMARY HYPERTENSION: ICD-10-CM

## 2024-04-30 PROBLEM — L30.9 DERMATITIS: Status: ACTIVE | Noted: 2024-04-30

## 2024-04-30 NOTE — PROGRESS NOTES
[facility]  4/11/2024    Brooklyn Mathews  is a 59 y.o. in the NF being seen for a f/u of   Chief Complaint   Patient presents with    gait instability    Rash       HPI   Being seen today for frequent falls and rash to chest.     Past Medical History:   Diagnosis Date    Arthritis     Hyperlipidemia     Hypertension     Thyroid disease      Past Surgical History:   Procedure Laterality Date    APPENDECTOMY      BIOPSY LIP N/A 10/13/2016    EXCISION UPPER LIP LESION  performed by Tristan Strong MD at [facility] [place]    BREAST SURGERY      CHOLECYSTECTOMY      ENDOSCOPY, COLON, DIAGNOSTIC      HYSTERECTOMY       No family history on file.  Social History     Socioeconomic History    Marital status:      Spouse name: Not on file    Number of children: Not on file    Years of education: Not on file    Highest education level: Not on file   Occupational History    Not on file   Tobacco Use    Smoking status: Every Day    Smokeless tobacco: Never   Substance and Sexual Activity    Alcohol use: Yes    Drug use: Not on file    Sexual activity: Not on file   Other Topics Concern    Not on file   Social History Narrative    Not on file     Social Determinants of Health     Financial Resource Strain: Not on file   Food Insecurity: Not on file   Transportation Needs: Not on file   Physical Activity: Not on file   Stress: Not on file   Social Connections: Not on file   Intimate Partner Violence: Not on file   Housing Stability: Not on file       Allergies: Patient has no known allergies.  NF MEDICATIONS REVIEWED    ROS:  See HPI  Constitutional: There are no reports of behavioral issues, change in appetite, fever, or weakness. No gross bleeding issues.Patient reports the doctor she saw the other day ordered her cream for the rash on her chest but she has not received any cream yet and she thinks the rash is spreading.  Respiratory: denies SOB, dyspnea, dyspnea on exertion,   Cardiovascular: denies CP,

## 2024-05-02 ENCOUNTER — OFFICE VISIT (OUTPATIENT)
Dept: GERIATRIC MEDICINE | Age: 59
End: 2024-05-02

## 2024-05-02 DIAGNOSIS — J42 CHRONIC BRONCHITIS, UNSPECIFIED CHRONIC BRONCHITIS TYPE (HCC): Primary | ICD-10-CM

## 2024-05-05 PROBLEM — R60.0 EDEMA OF BOTH LOWER EXTREMITIES: Status: ACTIVE | Noted: 2024-05-05

## 2024-05-06 ENCOUNTER — OFFICE VISIT (OUTPATIENT)
Dept: GERIATRIC MEDICINE | Age: 59
End: 2024-05-06

## 2024-05-06 DIAGNOSIS — R29.6 FREQUENT FALLS: ICD-10-CM

## 2024-05-06 DIAGNOSIS — J42 CHRONIC BRONCHITIS, UNSPECIFIED CHRONIC BRONCHITIS TYPE (HCC): ICD-10-CM

## 2024-05-06 DIAGNOSIS — K25.9 GASTRIC ULCER, UNSPECIFIED CHRONICITY, UNSPECIFIED WHETHER GASTRIC ULCER HEMORRHAGE OR PERFORATION PRESENT: ICD-10-CM

## 2024-05-06 DIAGNOSIS — E05.90 HYPERTHYROIDISM: ICD-10-CM

## 2024-05-06 DIAGNOSIS — I10 PRIMARY HYPERTENSION: ICD-10-CM

## 2024-05-06 DIAGNOSIS — E44.0 MODERATE PROTEIN-CALORIE MALNUTRITION (HCC): ICD-10-CM

## 2024-05-06 DIAGNOSIS — D64.9 ACUTE ON CHRONIC ANEMIA: ICD-10-CM

## 2024-05-06 DIAGNOSIS — R53.1 GENERALIZED WEAKNESS: Primary | ICD-10-CM

## 2024-05-06 DIAGNOSIS — I48.0 PAROXYSMAL ATRIAL FIBRILLATION (HCC): ICD-10-CM

## 2024-05-06 DIAGNOSIS — G62.1 ALCOHOL-INDUCED POLYNEUROPATHY (HCC): ICD-10-CM

## 2024-05-06 LAB
ANION GAP SERPL CALCULATED.3IONS-SCNC: 11 MEQ/L (ref 9–15)
BASOPHILS # BLD: 0 K/UL (ref 0–0.2)
BASOPHILS NFR BLD: 0.5 %
BUN SERPL-MCNC: 12 MG/DL (ref 6–20)
CALCIUM SERPL-MCNC: 9.9 MG/DL (ref 8.5–9.9)
CHLORIDE SERPL-SCNC: 104 MEQ/L (ref 95–107)
CO2 SERPL-SCNC: 24 MEQ/L (ref 20–31)
CREAT SERPL-MCNC: 0.64 MG/DL (ref 0.5–0.9)
EOSINOPHIL # BLD: 0.2 K/UL (ref 0–0.7)
EOSINOPHIL NFR BLD: 4 %
ERYTHROCYTE [DISTWIDTH] IN BLOOD BY AUTOMATED COUNT: 17.9 % (ref 11.5–14.5)
GLUCOSE SERPL-MCNC: 79 MG/DL (ref 70–99)
HCT VFR BLD AUTO: 33.3 % (ref 37–47)
HGB BLD-MCNC: 10.7 G/DL (ref 12–16)
LYMPHOCYTES # BLD: 2.6 K/UL (ref 1–4.8)
LYMPHOCYTES NFR BLD: 44.1 %
MCH RBC QN AUTO: 29.6 PG (ref 27–31.3)
MCHC RBC AUTO-ENTMCNC: 32.1 % (ref 33–37)
MCV RBC AUTO: 92.2 FL (ref 79.4–94.8)
MONOCYTES # BLD: 0.5 K/UL (ref 0.2–0.8)
MONOCYTES NFR BLD: 9 %
NEUTROPHILS # BLD: 2.5 K/UL (ref 1.4–6.5)
NEUTS SEG NFR BLD: 41.9 %
PLATELET # BLD AUTO: 315 K/UL (ref 130–400)
POTASSIUM SERPL-SCNC: 4.7 MEQ/L (ref 3.4–4.9)
RBC # BLD AUTO: 3.61 M/UL (ref 4.2–5.4)
SODIUM SERPL-SCNC: 139 MEQ/L (ref 135–144)
WBC # BLD AUTO: 6 K/UL (ref 4.8–10.8)

## 2024-05-06 ASSESSMENT — ENCOUNTER SYMPTOMS
COUGH: 1
CONSTIPATION: 1
BACK PAIN: 1

## 2024-05-07 PROBLEM — M54.50 LOW BACK PAIN: Status: ACTIVE | Noted: 2024-05-07

## 2024-05-07 NOTE — PROGRESS NOTES
[facility]  4/24/2024    Brooklyn Mathews  is a 59 y.o. in the NF being seen for a f/u of   Chief Complaint   Patient presents with    Back Pain       HPI   Being seen today for back pain.     Past Medical History:   Diagnosis Date    Arthritis     Hyperlipidemia     Hypertension     Thyroid disease      Past Surgical History:   Procedure Laterality Date    APPENDECTOMY      BIOPSY LIP N/A 10/13/2016    EXCISION UPPER LIP LESION  [place] [facility]    BREAST SURGERY      CHOLECYSTECTOMY      ENDOSCOPY, COLON, DIAGNOSTIC      HYSTERECTOMY       No family history on file.  Social History     Socioeconomic History    Marital status:      Spouse name: Not on file    Number of children: Not on file    Years of education: Not on file    Highest education level: Not on file   Occupational History    Not on file   Tobacco Use    Smoking status: Every Day    Smokeless tobacco: Never   Substance and Sexual Activity    Alcohol use: Yes    Drug use: Not on file    Sexual activity: Not on file   Other Topics Concern    Not on file   Social History Narrative    Not on file     Social Determinants of Health     Financial Resource Strain: Not on file   Food Insecurity: Not on file   Transportation Needs: Not on file   Physical Activity: Not on file   Stress: Not on file   Social Connections: Not on file   Intimate Partner Violence: Not on file   Housing Stability: Not on file       Allergies: Patient has no known allergies.  NF MEDICATIONS REVIEWED    ROS:  See HPI  Constitutional: There are no reports of behavioral issues, change in appetite, fever, or weakness. No gross bleeding issues.  Respiratory: denies SOB, dyspnea, dyspnea on exertion,   Cardiovascular: denies CP, lightheadedness,   GI: No reports of change of bowel habits, no N/V/D/C.   : no reports of change in bladder habits  Extremities: No reports of pain issues, no edema    Physical exam:   /90, temperature 97.9,

## 2024-05-09 NOTE — PROGRESS NOTES
(LOPRESSOR) 50 MG tablet Take 1 tablet by mouth 2 times daily      polyethylene glycol (GLYCOLAX) 17 GM/SCOOP powder Take 17 g by mouth daily      mirtazapine (REMERON) 15 MG tablet Take 1 tablet by mouth daily      Multiple Vitamin (MULTIVITAMIN ADULT PO) Take 1 tablet by mouth daily      potassium chloride (KLOR-CON) 20 MEQ packet Take 40 mEq by mouth daily      pantoprazole (PROTONIX) 40 MG tablet Take 1 tablet by mouth daily      montelukast (SINGULAIR) 10 MG tablet Take 1 tablet by mouth nightly      vitamin B-12 (CYANOCOBALAMIN) 500 MCG tablet Take 2 tablets by mouth daily      diclofenac sodium (VOLTAREN) 1 % GEL Apply topically every 12 hours as needed for Pain To joints      levothyroxine (SYNTHROID) 25 MCG tablet TAKE ONE TABLET BY MOUTH EVERY DAY (Patient not taking: Reported on 4/4/2024)  5    vitamin B-1 (THIAMINE) 100 MG tablet Take 1 tablet by mouth daily       No current facility-administered medications on file prior to visit.         No family history on file.    Social History     Socioeconomic History    Marital status:      Spouse name: Not on file    Number of children: Not on file    Years of education: Not on file    Highest education level: Not on file   Occupational History    Not on file   Tobacco Use    Smoking status: Every Day    Smokeless tobacco: Never   Substance and Sexual Activity    Alcohol use: Yes    Drug use: Not on file    Sexual activity: Not on file   Other Topics Concern    Not on file   Social History Narrative    Not on file     Social Determinants of Health     Financial Resource Strain: Not on file   Food Insecurity: Not on file   Transportation Needs: Not on file   Physical Activity: Not on file   Stress: Not on file   Social Connections: Not on file   Intimate Partner Violence: Not on file   Housing Stability: Not on file         Lab Results   Component Value Date    LABA1C 4.6 03/28/2023     Lab Results   Component Value Date    EAG 85 03/28/2023       Lab

## 2024-05-16 NOTE — PROGRESS NOTES
SUBJECTIVE:  59-year-old  seen woman seen for office for her functional decline patient denies vomiting emesis fever chills patient's moods state is improved at this time.  Cognition impaired    ROS: Pupils reactive oral mucosas dry chest no crackles with cardiovascular showed a regular abdomen tender extremity trace edema  The rest of the 14 point ROS negative    PHYSICAL EXAM: VSS per facility record      ASSESSMENT & PLAN:   Diagnosis Orders   1. Chronic bronchitis, unspecified chronic bronchitis type (HCC)        2. Primary hypertension        3. Moderate protein-calorie malnutrition (HCC)          Monitor function closely.  Continue to support consider continue monitor respiratory status.  BMP CBC pending            Past Medical History:   Diagnosis Date    Arthritis     Hyperlipidemia     Hypertension     Thyroid disease          Past Surgical History:   Procedure Laterality Date    APPENDECTOMY      BIOPSY LIP N/A 10/13/2016    EXCISION UPPER LIP LESION  performed by Tristan Strong MD at [facility] [place]    BREAST SURGERY      CHOLECYSTECTOMY      ENDOSCOPY, COLON, DIAGNOSTIC      HYSTERECTOMY           Current Outpatient Medications on File Prior to Visit   Medication Sig Dispense Refill    acetaminophen (TYLENOL) 325 MG tablet Take 2 tablets by mouth every 4 hours as needed for Pain or Fever      buPROPion (WELLBUTRIN XL) 150 MG extended release tablet Take 1 tablet by mouth every morning      folic acid (FOLVITE) 1 MG tablet Take 1 tablet by mouth daily      gabapentin (NEURONTIN) 300 MG capsule Take 1 capsule by mouth daily.      Carboxymethylcellulose Sodium (LUBRICATING PLUS EYE DROPS OP) Apply 1 drop to eye every 12 hours as needed (for dry eyes)      Magnesium 400 MG TABS Take 1 tablet by mouth in the morning and 1 tablet in the evening.      metoprolol tartrate (LOPRESSOR) 50 MG tablet Take 1 tablet by mouth 2 times daily      polyethylene glycol (GLYCOLAX) 17 GM/SCOOP powder Take 17 g by mouth daily

## 2024-05-18 PROBLEM — I48.0 PAROXYSMAL ATRIAL FIBRILLATION (HCC): Status: ACTIVE | Noted: 2024-05-18

## 2024-05-18 PROBLEM — D64.9 ACUTE ON CHRONIC ANEMIA: Status: ACTIVE | Noted: 2024-05-18

## 2024-05-18 NOTE — PROGRESS NOTES
have reviewed this resident's medication and treatment plan as well as most recent lab work. Discharge home with son Thursday with HHC, PT/OT, nurse and aide. Nursing staff may call in a 2 week supply of medications to the pharmacy of her choice. Follow-up with PCP within 1-2 weeks of discharge.It has been a pleasure following this patient while at this facility. Greater than 30 minutes spent in the discharge planning of this patient. No further changes will be made at this time.      Return for 1-2 weeks with pcp.  40 minutes spent on visit  Please note this report is partially produced by using speech recognition hardware.  It may contain errors related to the system, including grammar, punctuation and spelling as well as words and phrases that may seem inaccurate.  For any questions or concerns feel free to contact me for clarification        Electronically Signed By: Lamar MERRILL CNP on 5/18/24   ______________________________  Lamar MERRILL CNP

## 2024-05-25 NOTE — PROGRESS NOTES
SUBJECTIVE:        ROS:  The rest of the 14 point ROS negative    PHYSICAL EXAM: VSS per facility record      ASSESSMENT & PLAN:   Diagnosis Orders   1. Chronic bronchitis, unspecified chronic bronchitis type (HCC)        2. Primary hypertension        3. Paroxysmal atrial fibrillation (HCC)                      Past Medical History:   Diagnosis Date    Arthritis     Hyperlipidemia     Hypertension     Thyroid disease          Past Surgical History:   Procedure Laterality Date    APPENDECTOMY      BIOPSY LIP N/A 10/13/2016    EXCISION UPPER LIP LESION  performed by Tristan Strong MD at [facility] [place]    BREAST SURGERY      CHOLECYSTECTOMY      ENDOSCOPY, COLON, DIAGNOSTIC      HYSTERECTOMY           Current Outpatient Medications on File Prior to Visit   Medication Sig Dispense Refill    acetaminophen (TYLENOL) 325 MG tablet Take 2 tablets by mouth every 4 hours as needed for Pain or Fever      buPROPion (WELLBUTRIN XL) 150 MG extended release tablet Take 1 tablet by mouth every morning      folic acid (FOLVITE) 1 MG tablet Take 1 tablet by mouth daily      gabapentin (NEURONTIN) 300 MG capsule Take 1 capsule by mouth daily.      Carboxymethylcellulose Sodium (LUBRICATING PLUS EYE DROPS OP) Apply 1 drop to eye every 12 hours as needed (for dry eyes)      Magnesium 400 MG TABS Take 1 tablet by mouth in the morning and 1 tablet in the evening.      metoprolol tartrate (LOPRESSOR) 50 MG tablet Take 1 tablet by mouth 2 times daily      polyethylene glycol (GLYCOLAX) 17 GM/SCOOP powder Take 17 g by mouth daily      mirtazapine (REMERON) 15 MG tablet Take 1 tablet by mouth daily      Multiple Vitamin (MULTIVITAMIN ADULT PO) Take 1 tablet by mouth daily      potassium chloride (KLOR-CON) 20 MEQ packet Take 40 mEq by mouth daily      pantoprazole (PROTONIX) 40 MG tablet Take 1 tablet by mouth daily      montelukast (SINGULAIR) 10 MG tablet Take 1 tablet by mouth nightly      vitamin B-12 (CYANOCOBALAMIN) 500 MCG tablet Take 2

## 2024-05-31 NOTE — PROGRESS NOTES
every 12 hours as needed for Pain To joints      levothyroxine (SYNTHROID) 25 MCG tablet TAKE ONE TABLET BY MOUTH EVERY DAY (Patient not taking: Reported on 4/4/2024)  5    vitamin B-1 (THIAMINE) 100 MG tablet Take 1 tablet by mouth daily       No current facility-administered medications on file prior to visit.         No family history on file.    Social History     Socioeconomic History    Marital status:      Spouse name: Not on file    Number of children: Not on file    Years of education: Not on file    Highest education level: Not on file   Occupational History    Not on file   Tobacco Use    Smoking status: Every Day    Smokeless tobacco: Never   Substance and Sexual Activity    Alcohol use: Yes    Drug use: Not on file    Sexual activity: Not on file   Other Topics Concern    Not on file   Social History Narrative    Not on file     Social Determinants of Health     Financial Resource Strain: Not on file   Food Insecurity: Not on file   Transportation Needs: Not on file   Physical Activity: Not on file   Stress: Not on file   Social Connections: Not on file   Intimate Partner Violence: Not on file   Housing Stability: Not on file         Lab Results   Component Value Date    LABA1C 4.6 03/28/2023     Lab Results   Component Value Date    EAG 85 03/28/2023       Lab Results   Component Value Date/Time     05/06/2024 10:25 AM    K 4.7 05/06/2024 10:25 AM     05/06/2024 10:25 AM    CO2 24 05/06/2024 10:25 AM    BUN 12 05/06/2024 10:25 AM    CREATININE 0.64 05/06/2024 10:25 AM    GLUCOSE 79 05/06/2024 10:25 AM    GLUCOSE 89 07/14/2022 09:58 AM    CALCIUM 9.9 05/06/2024 10:25 AM        Lab Results   Component Value Date    CHOL 265 (H) 07/14/2022     Lab Results   Component Value Date    TRIG 58 07/14/2022     Lab Results   Component Value Date    .0 07/14/2022     No components found for: \"LDLCHOLESTEROL\", \"LDLCALC\"  Lab Results   Component Value Date    VLDL 12 07/14/2022     Lab

## 2024-09-23 ENCOUNTER — LAB (OUTPATIENT)
Dept: LAB | Facility: LAB | Age: 59
End: 2024-09-23
Payer: MEDICAID

## 2024-09-23 DIAGNOSIS — M25.50 PAIN IN UNSPECIFIED JOINT: ICD-10-CM

## 2024-09-23 DIAGNOSIS — D64.9 ANEMIA, UNSPECIFIED: ICD-10-CM

## 2024-09-23 DIAGNOSIS — E78.5 HYPERLIPIDEMIA, UNSPECIFIED: ICD-10-CM

## 2024-09-23 DIAGNOSIS — N39.0 URINARY TRACT INFECTION, SITE NOT SPECIFIED: ICD-10-CM

## 2024-09-23 DIAGNOSIS — M79.10 MYALGIA, UNSPECIFIED SITE: ICD-10-CM

## 2024-09-23 DIAGNOSIS — I10 ESSENTIAL (PRIMARY) HYPERTENSION: Primary | ICD-10-CM

## 2024-09-23 DIAGNOSIS — E55.9 VITAMIN D DEFICIENCY, UNSPECIFIED: ICD-10-CM

## 2024-09-23 LAB
25(OH)D3 SERPL-MCNC: 30 NG/ML (ref 30–100)
ALBUMIN SERPL BCP-MCNC: 4.5 G/DL (ref 3.4–5)
ALP SERPL-CCNC: 98 U/L (ref 33–110)
ALT SERPL W P-5'-P-CCNC: 19 U/L (ref 7–45)
ANION GAP SERPL CALC-SCNC: 12 MMOL/L (ref 10–20)
APPEARANCE UR: CLEAR
AST SERPL W P-5'-P-CCNC: 26 U/L (ref 9–39)
BACTERIA #/AREA URNS AUTO: ABNORMAL /HPF
BASOPHILS # BLD AUTO: 0.02 X10*3/UL (ref 0–0.1)
BASOPHILS NFR BLD AUTO: 0.4 %
BILIRUB SERPL-MCNC: 0.5 MG/DL (ref 0–1.2)
BILIRUB UR STRIP.AUTO-MCNC: NEGATIVE MG/DL
BUN SERPL-MCNC: 18 MG/DL (ref 6–23)
CALCIUM SERPL-MCNC: 10.1 MG/DL (ref 8.6–10.3)
CHLORIDE SERPL-SCNC: 101 MMOL/L (ref 98–107)
CHOLEST SERPL-MCNC: 252 MG/DL (ref 0–199)
CHOLESTEROL/HDL RATIO: 2.7
CK SERPL-CCNC: 82 U/L (ref 0–215)
CO2 SERPL-SCNC: 31 MMOL/L (ref 21–32)
COLOR UR: YELLOW
CREAT SERPL-MCNC: 1.06 MG/DL (ref 0.5–1.05)
CREAT UR-MCNC: 172.4 MG/DL (ref 20–320)
CREAT UR-MCNC: 172.4 MG/DL (ref 20–320)
EGFRCR SERPLBLD CKD-EPI 2021: 61 ML/MIN/1.73M*2
EOSINOPHIL # BLD AUTO: 0.1 X10*3/UL (ref 0–0.7)
EOSINOPHIL NFR BLD AUTO: 1.8 %
ERYTHROCYTE [DISTWIDTH] IN BLOOD BY AUTOMATED COUNT: 16 % (ref 11.5–14.5)
ERYTHROCYTE [SEDIMENTATION RATE] IN BLOOD BY WESTERGREN METHOD: 23 MM/H (ref 0–30)
FERRITIN SERPL-MCNC: 2607 NG/ML (ref 8–150)
GLUCOSE SERPL-MCNC: 87 MG/DL (ref 74–99)
GLUCOSE UR STRIP.AUTO-MCNC: NORMAL MG/DL
HCT VFR BLD AUTO: 36.4 % (ref 36–46)
HDLC SERPL-MCNC: 92.7 MG/DL
HGB BLD-MCNC: 11.4 G/DL (ref 12–16)
HYALINE CASTS #/AREA URNS AUTO: ABNORMAL /LPF
IMM GRANULOCYTES # BLD AUTO: 0.04 X10*3/UL (ref 0–0.7)
IMM GRANULOCYTES NFR BLD AUTO: 0.7 % (ref 0–0.9)
IRON SATN MFR SERPL: 26 % (ref 25–45)
IRON SERPL-MCNC: 103 UG/DL (ref 35–150)
KETONES UR STRIP.AUTO-MCNC: NEGATIVE MG/DL
LDLC SERPL CALC-MCNC: 125 MG/DL
LEUKOCYTE ESTERASE UR QL STRIP.AUTO: ABNORMAL
LYMPHOCYTES # BLD AUTO: 2.47 X10*3/UL (ref 1.2–4.8)
LYMPHOCYTES NFR BLD AUTO: 43.6 %
MAGNESIUM SERPL-MCNC: 1.88 MG/DL (ref 1.6–2.4)
MCH RBC QN AUTO: 28.6 PG (ref 26–34)
MCHC RBC AUTO-ENTMCNC: 31.3 G/DL (ref 32–36)
MCV RBC AUTO: 92 FL (ref 80–100)
MICROALBUMIN UR-MCNC: <7 MG/L
MICROALBUMIN/CREAT UR: NORMAL MG/G{CREAT}
MONOCYTES # BLD AUTO: 0.35 X10*3/UL (ref 0.1–1)
MONOCYTES NFR BLD AUTO: 6.2 %
MUCOUS THREADS #/AREA URNS AUTO: ABNORMAL /LPF
NEUTROPHILS # BLD AUTO: 2.69 X10*3/UL (ref 1.2–7.7)
NEUTROPHILS NFR BLD AUTO: 47.3 %
NITRITE UR QL STRIP.AUTO: NEGATIVE
NON HDL CHOLESTEROL: 159 MG/DL (ref 0–149)
NRBC BLD-RTO: 0 /100 WBCS (ref 0–0)
PH UR STRIP.AUTO: 6.5 [PH]
PLATELET # BLD AUTO: 313 X10*3/UL (ref 150–450)
POTASSIUM SERPL-SCNC: 4.9 MMOL/L (ref 3.5–5.3)
PROT SERPL-MCNC: 7.4 G/DL (ref 6.4–8.2)
PROT UR STRIP.AUTO-MCNC: NEGATIVE MG/DL
PROT UR-ACNC: 10 MG/DL (ref 5–24)
PROT/CREAT UR: 0.06 MG/MG CREAT (ref 0–0.17)
RBC # BLD AUTO: 3.98 X10*6/UL (ref 4–5.2)
RBC # UR STRIP.AUTO: NEGATIVE /UL
RBC #/AREA URNS AUTO: ABNORMAL /HPF
RHEUMATOID FACT SER NEPH-ACNC: <10 IU/ML (ref 0–15)
SODIUM SERPL-SCNC: 139 MMOL/L (ref 136–145)
SP GR UR STRIP.AUTO: 1.02
SQUAMOUS #/AREA URNS AUTO: ABNORMAL /HPF
TIBC SERPL-MCNC: 399 UG/DL (ref 240–445)
TRIGL SERPL-MCNC: 173 MG/DL (ref 0–149)
TSH SERPL-ACNC: 1.94 MIU/L (ref 0.44–3.98)
UIBC SERPL-MCNC: 296 UG/DL (ref 110–370)
URATE SERPL-MCNC: 6.7 MG/DL (ref 2.3–6.7)
UROBILINOGEN UR STRIP.AUTO-MCNC: NORMAL MG/DL
VIT B12 SERPL-MCNC: 2227 PG/ML (ref 211–911)
VLDL: 35 MG/DL (ref 0–40)
WBC # BLD AUTO: 5.7 X10*3/UL (ref 4.4–11.3)
WBC #/AREA URNS AUTO: ABNORMAL /HPF

## 2024-09-23 PROCEDURE — 87086 URINE CULTURE/COLONY COUNT: CPT

## 2024-09-23 PROCEDURE — 83550 IRON BINDING TEST: CPT

## 2024-09-23 PROCEDURE — 84550 ASSAY OF BLOOD/URIC ACID: CPT

## 2024-09-23 PROCEDURE — 82550 ASSAY OF CK (CPK): CPT

## 2024-09-23 PROCEDURE — 83540 ASSAY OF IRON: CPT

## 2024-09-23 PROCEDURE — 85025 COMPLETE CBC W/AUTO DIFF WBC: CPT

## 2024-09-23 PROCEDURE — 80061 LIPID PANEL: CPT

## 2024-09-23 PROCEDURE — 81001 URINALYSIS AUTO W/SCOPE: CPT

## 2024-09-23 PROCEDURE — 84156 ASSAY OF PROTEIN URINE: CPT

## 2024-09-23 PROCEDURE — 82043 UR ALBUMIN QUANTITATIVE: CPT

## 2024-09-23 PROCEDURE — 82306 VITAMIN D 25 HYDROXY: CPT

## 2024-09-23 PROCEDURE — 83735 ASSAY OF MAGNESIUM: CPT

## 2024-09-23 PROCEDURE — 86431 RHEUMATOID FACTOR QUANT: CPT

## 2024-09-23 PROCEDURE — 84443 ASSAY THYROID STIM HORMONE: CPT

## 2024-09-23 PROCEDURE — 86038 ANTINUCLEAR ANTIBODIES: CPT

## 2024-09-23 PROCEDURE — 82570 ASSAY OF URINE CREATININE: CPT

## 2024-09-23 PROCEDURE — 82728 ASSAY OF FERRITIN: CPT

## 2024-09-23 PROCEDURE — 36415 COLL VENOUS BLD VENIPUNCTURE: CPT

## 2024-09-23 PROCEDURE — 82607 VITAMIN B-12: CPT

## 2024-09-23 PROCEDURE — 80053 COMPREHEN METABOLIC PANEL: CPT

## 2024-09-23 PROCEDURE — 85652 RBC SED RATE AUTOMATED: CPT

## 2024-09-24 LAB
ANA SER QL HEP2 SUBST: NEGATIVE
BACTERIA UR CULT: NORMAL

## 2024-10-09 ENCOUNTER — HOSPITAL ENCOUNTER (OUTPATIENT)
Dept: RADIOLOGY | Facility: CLINIC | Age: 59
Discharge: HOME | End: 2024-10-09
Payer: MEDICAID

## 2024-10-09 ENCOUNTER — OFFICE VISIT (OUTPATIENT)
Dept: ORTHOPEDIC SURGERY | Facility: CLINIC | Age: 59
End: 2024-10-09
Payer: MEDICAID

## 2024-10-09 DIAGNOSIS — M25.552 PAIN OF LEFT HIP: ICD-10-CM

## 2024-10-09 DIAGNOSIS — M53.3 SACROILIAC JOINT DYSFUNCTION OF BOTH SIDES: Primary | ICD-10-CM

## 2024-10-09 DIAGNOSIS — M25.551 PAIN OF RIGHT HIP: ICD-10-CM

## 2024-10-09 PROCEDURE — 73522 X-RAY EXAM HIPS BI 3-4 VIEWS: CPT

## 2024-10-09 PROCEDURE — 99214 OFFICE O/P EST MOD 30 MIN: CPT | Performed by: ORTHOPAEDIC SURGERY

## 2024-10-09 PROCEDURE — 99213 OFFICE O/P EST LOW 20 MIN: CPT | Performed by: ORTHOPAEDIC SURGERY

## 2024-10-09 PROCEDURE — 73522 X-RAY EXAM HIPS BI 3-4 VIEWS: CPT | Mod: BILATERAL PROCEDURE | Performed by: ORTHOPAEDIC SURGERY

## 2024-10-09 RX ORDER — METHYLPREDNISOLONE 4 MG/1
TABLET ORAL
Qty: 21 TABLET | Refills: 0 | Status: SHIPPED | OUTPATIENT
Start: 2024-10-09

## 2024-10-09 NOTE — PROGRESS NOTES
"  Subjective    Patient ID: Jolanta    Chief Complaint:   Chief Complaint   Patient presents with    Left Hip - Pain     New problem, xrays today    Right Hip - Pain     New problem, xrays today     History of present illness    59-year-old female presented clinic today for evaluation bilateral hip pain.  We have seen her previously for other injuries.  She states that over the last 6 months she has had increasing difficulty with weightbearing.  Most of her pain seems to be over her \"tailbone\".  She states that she is in constant pain with sitting with standing with walking with moving.  She has previous history of bilateral peripheral neuropathy.  She is being treated with Neurontin 300 mg.  Her primary care physician Dr. Lazo has been managing this.  She is also seen Dr. King previously.  She is ambulating with the assistance of a walker at this time.  She states she feels unsteady as far as her gait goes.      Past medical , Surgical, Family and social history reviewed.      Physical exam  General: No acute distress and breathing comfortably.  Patient is pleasant and cooperative with the examination.    Extremity  Both hips neurovascular intact.  She demonstrates good range of motion.  Negative straight leg raise testing.  Mild tenderness palpation over both SI joints.  Pain over both hip bursa region.  No calf swelling or tenderness.  Compartment soft.  Decreased sensation distally.    Diagnostics  [ none]  XR hips bilateral 3 or 4 VW w pelvis when performed    Result Date: 10/9/2024  Interpreted By:  Mayur Rick, STUDY: XR HIPS BILATERAL 3 OR 4 VW WITH PELVIS WHEN PERFORMED; 10/9/2024 10:53 am   INDICATION: Signs/Symptoms:pain.   ACCESSION NUMBER(S): [id number]   ORDERING CLINICIAN: MAYRU RICK   FINDINGS: AP pelvis and lateral view both hips. Joint spaces are well maintained mild joint space narrowing no evidence of fracture dislocation or other bony abnormality     Signed by: Mayur " [place] 10/9/2024 11:20 AM Dictation workstation:   [id number]       Procedure  [ none]    Assessment  Coccydynia  Bilateral SI joint dysfunction    Treatment plan  1.  At this time we a long discussion regards to conservative treatment options for pelvic pain.  2.  She will continue with Neurontin as needed.  She will begin outpatient physical therapy working on pelvic stability core strength.  Prescription Medrol Dosepak sent to the pharmacy.  Follow-up with us in approximately 6 weeks for reevaluation without x-ray.  3.  All of the patient's questions were answered.  For complete plan and/or surgical details, please refer to Dr. Rick's portion of the split/shared dictation.    Orders Placed This Encounter    XR hips bilateral 3 or 4 VW w pelvis when performed       This note was prepared using voice recognition software.  The details of this note are correct and have been reviewed, and corrected to the best of my ability.  Some grammatical areas may persist related to the Dragon software    Greg Daniels PA-C, [facility]  Or[facility]opedic [place]    [phone]    In a face-to-face encounter performed today, I Robert Rick MD performed a history and physical examination, discussed pertinent diagnostic studies if indicated, and discussed diagnosis and management strategies with both the patient and the midlevel provider.  I reviewed the midlevel's note and agree with the documented findings and plan of care.  Greater than 50% of the evaluation and treatment decision was performed by the physician myself during today's visit.    59-year-old female I seen previously presents with new complaint of pain in her pelvic area as she states she feels it in the posterior aspect of her pelvis along her tailbone does not recall any specific episode or injury she has significant bilateral lower extremity neuropathy and takes Neurontin for this she does not recall a specific episode or injury.  On  examination she has got tenderness along the SI joint posteriorly and into the sacral area minimal tenderness in the bursal area no pain with range of motion of her hips.  X-rays are obtained today see dictated x-ray report.  Impression is SI joint dysfunction and sacral pain.  Plan is Medrol Dosepak and physical therapy follow-up 6 weeks.  I reassured her structurally everything looks okay as far as her hip joints ago      Patient has severe impairment related to her presenting condition.  It is significantly impairing bodily function.  We did discuss surgical and nonsurgical options.    This note was prepared using voice recognition software.  The details of this note are correct and have been reviewed, and corrected to the best of my ability.  Some grammatical areas may persist related to the Dragon software    Robert Rick MD  Senior Attending Physician  [facility]    [phone]

## 2024-11-13 ENCOUNTER — OFFICE VISIT (OUTPATIENT)
Dept: ORTHOPEDIC SURGERY | Facility: CLINIC | Age: 59
End: 2024-11-13
Payer: MEDICAID

## 2024-11-13 DIAGNOSIS — M53.3 SACROILIAC JOINT DYSFUNCTION OF BOTH SIDES: Primary | ICD-10-CM

## 2024-11-13 PROCEDURE — 99213 OFFICE O/P EST LOW 20 MIN: CPT | Performed by: ORTHOPAEDIC SURGERY

## 2024-11-13 RX ORDER — MELOXICAM 15 MG/1
15 TABLET ORAL
Qty: 30 TABLET | Refills: 2 | Status: SHIPPED | OUTPATIENT
Start: 2024-11-13

## 2024-11-13 NOTE — PROGRESS NOTES
Subjective    Patient ID: Jolanta    Chief Complaint:   Chief Complaint   Patient presents with    Lower Back - Follow-up     Bilateral SI joint dysfunction, Coccydynia  S/P: Medrol Dosepak, Physical Therapy     History of present illness    59-year-old female presented clinic today for follow-up evaluation coccydynia and bilateral SI joint dysfunction.  She states that the Medrol Dosepak and physical therapy does seem to be helping at this time.  She is able to tolerate sitting for longer periods.  She is ambulating with the assistance of a walker today mostly due to her back.  No numbness tingling no fevers chills.  She has been on meloxicam for quite some time which also does seem to be helping.      Past medical , Surgical, Family and social history reviewed.      Physical exam  General: No acute distress and breathing comfortably.  Patient is pleasant and cooperative with the examination.    Extremity  Bilateral lower extremities neurovascular intact.  Good range of motion.  No groin pain at this time.  Mild coccydynia.  No calf swelling or tenderness.  Compartment soft.  Mild weakness.  Improving strength.    Diagnostics  [ none]  No results found.     Procedure  [ none]    Assessment  Bilateral SI joint dysfunction  Coccygeal pain    Treatment plan  1.  At this time she will continue with conservative methods such as outpatient therapy  2.  We did go ahead and send over refill to her pharmacy for meloxicam with a few refills on this.  3.  She will follow-up with us as needed.  For complete plan and/or surgical details, please refer to Dr. Rick's portion of the split/shared dictation.  4.  All of the patient's questions were answered.    No orders of the defined types were placed in this encounter.      This note was prepared using voice recognition software.  The details of this note are correct and have been reviewed, and corrected to the best of my ability.  Some grammatical areas may persist related  to the Dragon software    Greg Daniels PA-C, [facility]  [facility]  Orthopedic [place]    [phone]    In a face-to-face encounter performed today, I Robert Rick MD performed a history and physical examination, discussed pertinent diagnostic studies if indicated, and discussed diagnosis and management strategies with both the patient and the midlevel provider.  I reviewed the midlevel's note and agree with the documented findings and plan of care.  Greater than 50% of the evaluation and treatment decision was performed by the physician myself during today's visit.    59-year-old female here for follow-up of her bilateral SI joint pain she states the Medrol Dosepak and physical therapy seem to be helping she is not perfect but she is much better than she was no locking giving way no numbness or tingling she is ambulating with a walker.  On examination minimal tenderness palpation good range of motion neurologically intact.  Impression is resolving SI joint pain.  Plan is continue with her exercise program continue with the physical therapy, prescription for meloxicam sent to pharmacy, follow-up as needed.      Patient has severe impairment related to her presenting condition.  It is significantly impairing bodily function.  We did discuss surgical and nonsurgical options.    This note was prepared using voice recognition software.  The details of this note are correct and have been reviewed, and corrected to the best of my ability.  Some grammatical areas may persist related to the Dragon software    Robert Rick MD  Senior Attending Physician  [facility]    [phone]

## 2024-11-18 ENCOUNTER — TELEPHONE (OUTPATIENT)
Dept: ORTHOPEDIC SURGERY | Facility: CLINIC | Age: 59
End: 2024-11-18
Payer: MEDICAID

## 2024-11-18 DIAGNOSIS — M53.3 SACROILIAC JOINT DYSFUNCTION OF BOTH SIDES: ICD-10-CM

## 2024-11-19 RX ORDER — MELOXICAM 15 MG/1
15 TABLET ORAL
Qty: 30 TABLET | Refills: 2 | Status: SHIPPED | OUTPATIENT
Start: 2024-11-19 | End: 2024-11-20 | Stop reason: SDUPTHER

## 2024-11-20 ENCOUNTER — TELEPHONE (OUTPATIENT)
Dept: ORTHOPEDIC SURGERY | Facility: CLINIC | Age: 59
End: 2024-11-20
Payer: MEDICAID

## 2024-11-20 DIAGNOSIS — M53.3 SACROILIAC JOINT DYSFUNCTION OF BOTH SIDES: ICD-10-CM

## 2024-11-20 RX ORDER — MELOXICAM 15 MG/1
15 TABLET ORAL
Qty: 30 TABLET | Refills: 2 | Status: SHIPPED | OUTPATIENT
Start: 2024-11-20

## 2024-11-20 NOTE — TELEPHONE ENCOUNTER
----- Message from Naheed HERNANDEZ sent at 11/20/2024  9:14 AM EST -----  Regarding: SCRIPT WENT TO [facility]  She said she verified pharmacy was [facility] on [address] but her script went to [facility] [address]. She said she hasn't used that pharmacy in years. She doesn't want to go there to get it.

## 2024-12-18 ENCOUNTER — APPOINTMENT (OUTPATIENT)
Dept: RADIOLOGY | Facility: HOSPITAL | Age: 59
End: 2024-12-18
Payer: MEDICAID

## 2024-12-18 ENCOUNTER — HOSPITAL ENCOUNTER (INPATIENT)
Facility: HOSPITAL | Age: 59
LOS: 4 days | Discharge: HOME HEALTH | End: 2024-12-22
Attending: STUDENT IN AN ORGANIZED HEALTH CARE EDUCATION/TRAINING PROGRAM | Admitting: STUDENT IN AN ORGANIZED HEALTH CARE EDUCATION/TRAINING PROGRAM
Payer: MEDICAID

## 2024-12-18 ENCOUNTER — APPOINTMENT (OUTPATIENT)
Dept: CARDIOLOGY | Facility: HOSPITAL | Age: 59
End: 2024-12-18
Payer: MEDICAID

## 2024-12-18 DIAGNOSIS — N17.9 AKI (ACUTE KIDNEY INJURY) (CMS-HCC): ICD-10-CM

## 2024-12-18 DIAGNOSIS — E87.29 ALCOHOLIC KETOACIDOSIS: Primary | ICD-10-CM

## 2024-12-18 DIAGNOSIS — R07.9 CHEST PAIN, UNSPECIFIED: ICD-10-CM

## 2024-12-18 DIAGNOSIS — J18.9 PNEUMONIA OF BOTH LUNGS DUE TO INFECTIOUS ORGANISM, UNSPECIFIED PART OF LUNG: ICD-10-CM

## 2024-12-18 DIAGNOSIS — E44.0 MODERATE PROTEIN-CALORIE MALNUTRITION (WEIGHT FOR AGE 60-74% OF STANDARD) (MULTI): ICD-10-CM

## 2024-12-18 LAB
ALBUMIN SERPL BCP-MCNC: 4.7 G/DL (ref 3.4–5)
ALP SERPL-CCNC: 98 U/L (ref 33–110)
ALT SERPL W P-5'-P-CCNC: 21 U/L (ref 7–45)
ANION GAP BLDV CALCULATED.4IONS-SCNC: 34 MMOL/L (ref 10–25)
ANION GAP SERPL CALC-SCNC: 32 MMOL/L (ref 10–20)
APAP SERPL-MCNC: <10 UG/ML
AST SERPL W P-5'-P-CCNC: 31 U/L (ref 9–39)
B-OH-BUTYR SERPL-SCNC: 12.91 MMOL/L (ref 0.02–0.27)
BASE EXCESS BLDV CALC-SCNC: -25.8 MMOL/L (ref -2–3)
BASOPHILS # BLD AUTO: 0.02 X10*3/UL (ref 0–0.1)
BASOPHILS NFR BLD AUTO: 0.2 %
BILIRUB SERPL-MCNC: 0.4 MG/DL (ref 0–1.2)
BNP SERPL-MCNC: 46 PG/ML (ref 0–99)
BODY TEMPERATURE: ABNORMAL
BUN SERPL-MCNC: 15 MG/DL (ref 6–23)
CA-I BLDV-SCNC: 1.32 MMOL/L (ref 1.1–1.33)
CALCIUM SERPL-MCNC: 8.6 MG/DL (ref 8.6–10.3)
CARDIAC TROPONIN I PNL SERPL HS: 7 NG/L (ref 0–13)
CARDIAC TROPONIN I PNL SERPL HS: 8 NG/L (ref 0–13)
CHLORIDE BLDV-SCNC: 97 MMOL/L (ref 98–107)
CHLORIDE SERPL-SCNC: 104 MMOL/L (ref 98–107)
CO2 SERPL-SCNC: 5 MMOL/L (ref 21–32)
CREAT SERPL-MCNC: 1.43 MG/DL (ref 0.5–1.05)
CRITICAL CALL TIME: 2228
CRITICAL CALLED BY: ABNORMAL
CRITICAL CALLED TO: ABNORMAL
CRITICAL READ BACK: ABNORMAL
EGFRCR SERPLBLD CKD-EPI 2021: 42 ML/MIN/1.73M*2
EOSINOPHIL # BLD AUTO: 0.05 X10*3/UL (ref 0–0.7)
EOSINOPHIL NFR BLD AUTO: 0.5 %
ERYTHROCYTE [DISTWIDTH] IN BLOOD BY AUTOMATED COUNT: 16.2 % (ref 11.5–14.5)
ETHANOL SERPL-MCNC: <10 MG/DL
FLUAV RNA RESP QL NAA+PROBE: NOT DETECTED
FLUBV RNA RESP QL NAA+PROBE: NOT DETECTED
GLUCOSE BLDV-MCNC: 180 MG/DL (ref 74–99)
GLUCOSE SERPL-MCNC: 145 MG/DL (ref 74–99)
HCO3 BLDV-SCNC: 5.1 MMOL/L (ref 22–26)
HCT VFR BLD AUTO: 41.9 % (ref 36–46)
HCT VFR BLD EST: 39 % (ref 36–46)
HGB BLD-MCNC: 12.4 G/DL (ref 12–16)
HGB BLDV-MCNC: 13 G/DL (ref 12–16)
IMM GRANULOCYTES # BLD AUTO: 0.07 X10*3/UL (ref 0–0.7)
IMM GRANULOCYTES NFR BLD AUTO: 0.7 % (ref 0–0.9)
INHALED O2 CONCENTRATION: 28 %
LACTATE BLDV-SCNC: 2.3 MMOL/L (ref 0.4–2)
LIPASE SERPL-CCNC: 121 U/L (ref 9–82)
LYMPHOCYTES # BLD AUTO: 0.39 X10*3/UL (ref 1.2–4.8)
LYMPHOCYTES NFR BLD AUTO: 4 %
MAGNESIUM SERPL-MCNC: 1.79 MG/DL (ref 1.6–2.4)
MCH RBC QN AUTO: 30 PG (ref 26–34)
MCHC RBC AUTO-ENTMCNC: 29.6 G/DL (ref 32–36)
MCV RBC AUTO: 102 FL (ref 80–100)
MONOCYTES # BLD AUTO: 0.53 X10*3/UL (ref 0.1–1)
MONOCYTES NFR BLD AUTO: 5.4 %
NEUTROPHILS # BLD AUTO: 8.75 X10*3/UL (ref 1.2–7.7)
NEUTROPHILS NFR BLD AUTO: 89.2 %
NRBC BLD-RTO: 0 /100 WBCS (ref 0–0)
OXYHGB MFR BLDV: 61.7 % (ref 45–75)
PCO2 BLDV: 23 MM HG (ref 41–51)
PH BLDV: 6.95 PH (ref 7.33–7.43)
PHOSPHATE SERPL-MCNC: 4.9 MG/DL (ref 2.5–4.9)
PLATELET # BLD AUTO: 219 X10*3/UL (ref 150–450)
PO2 BLDV: 44 MM HG (ref 35–45)
POTASSIUM BLDV-SCNC: 4.7 MMOL/L (ref 3.5–5.3)
POTASSIUM SERPL-SCNC: 4.3 MMOL/L (ref 3.5–5.3)
PROT SERPL-MCNC: 7.8 G/DL (ref 6.4–8.2)
RBC # BLD AUTO: 4.13 X10*6/UL (ref 4–5.2)
SALICYLATES SERPL-MCNC: <3 MG/DL
SAO2 % BLDV: 62 % (ref 45–75)
SARS-COV-2 RNA RESP QL NAA+PROBE: NOT DETECTED
SODIUM BLDV-SCNC: 131 MMOL/L (ref 136–145)
SODIUM SERPL-SCNC: 137 MMOL/L (ref 136–145)
T4 FREE SERPL-MCNC: 0.7 NG/DL (ref 0.61–1.12)
TSH SERPL-ACNC: 0.14 MIU/L (ref 0.44–3.98)
WBC # BLD AUTO: 9.8 X10*3/UL (ref 4.4–11.3)

## 2024-12-18 PROCEDURE — 2500000004 HC RX 250 GENERAL PHARMACY W/ HCPCS (ALT 636 FOR OP/ED): Performed by: STUDENT IN AN ORGANIZED HEALTH CARE EDUCATION/TRAINING PROGRAM

## 2024-12-18 PROCEDURE — 83036 HEMOGLOBIN GLYCOSYLATED A1C: CPT | Mod: ELYLAB | Performed by: STUDENT IN AN ORGANIZED HEALTH CARE EDUCATION/TRAINING PROGRAM

## 2024-12-18 PROCEDURE — 99291 CRITICAL CARE FIRST HOUR: CPT | Performed by: STUDENT IN AN ORGANIZED HEALTH CARE EDUCATION/TRAINING PROGRAM

## 2024-12-18 PROCEDURE — 84439 ASSAY OF FREE THYROXINE: CPT | Performed by: STUDENT IN AN ORGANIZED HEALTH CARE EDUCATION/TRAINING PROGRAM

## 2024-12-18 PROCEDURE — 2550000001 HC RX 255 CONTRASTS: Performed by: STUDENT IN AN ORGANIZED HEALTH CARE EDUCATION/TRAINING PROGRAM

## 2024-12-18 PROCEDURE — 96361 HYDRATE IV INFUSION ADD-ON: CPT

## 2024-12-18 PROCEDURE — 71045 X-RAY EXAM CHEST 1 VIEW: CPT | Performed by: SURGERY

## 2024-12-18 PROCEDURE — 74174 CTA ABD&PLVS W/CONTRAST: CPT | Performed by: SURGERY

## 2024-12-18 PROCEDURE — 2020000001 HC ICU ROOM DAILY

## 2024-12-18 PROCEDURE — 84100 ASSAY OF PHOSPHORUS: CPT | Performed by: STUDENT IN AN ORGANIZED HEALTH CARE EDUCATION/TRAINING PROGRAM

## 2024-12-18 PROCEDURE — 71045 X-RAY EXAM CHEST 1 VIEW: CPT

## 2024-12-18 PROCEDURE — 36415 COLL VENOUS BLD VENIPUNCTURE: CPT | Performed by: STUDENT IN AN ORGANIZED HEALTH CARE EDUCATION/TRAINING PROGRAM

## 2024-12-18 PROCEDURE — 87636 SARSCOV2 & INF A&B AMP PRB: CPT | Performed by: STUDENT IN AN ORGANIZED HEALTH CARE EDUCATION/TRAINING PROGRAM

## 2024-12-18 PROCEDURE — 2500000005 HC RX 250 GENERAL PHARMACY W/O HCPCS: Performed by: STUDENT IN AN ORGANIZED HEALTH CARE EDUCATION/TRAINING PROGRAM

## 2024-12-18 PROCEDURE — 84484 ASSAY OF TROPONIN QUANT: CPT | Performed by: STUDENT IN AN ORGANIZED HEALTH CARE EDUCATION/TRAINING PROGRAM

## 2024-12-18 PROCEDURE — 80320 DRUG SCREEN QUANTALCOHOLS: CPT | Performed by: STUDENT IN AN ORGANIZED HEALTH CARE EDUCATION/TRAINING PROGRAM

## 2024-12-18 PROCEDURE — 2500000001 HC RX 250 WO HCPCS SELF ADMINISTERED DRUGS (ALT 637 FOR MEDICARE OP): Performed by: STUDENT IN AN ORGANIZED HEALTH CARE EDUCATION/TRAINING PROGRAM

## 2024-12-18 PROCEDURE — 83735 ASSAY OF MAGNESIUM: CPT | Performed by: STUDENT IN AN ORGANIZED HEALTH CARE EDUCATION/TRAINING PROGRAM

## 2024-12-18 PROCEDURE — 96365 THER/PROPH/DIAG IV INF INIT: CPT

## 2024-12-18 PROCEDURE — 71275 CT ANGIOGRAPHY CHEST: CPT

## 2024-12-18 PROCEDURE — 84145 PROCALCITONIN (PCT): CPT | Mod: ELYLAB | Performed by: STUDENT IN AN ORGANIZED HEALTH CARE EDUCATION/TRAINING PROGRAM

## 2024-12-18 PROCEDURE — 83690 ASSAY OF LIPASE: CPT | Performed by: STUDENT IN AN ORGANIZED HEALTH CARE EDUCATION/TRAINING PROGRAM

## 2024-12-18 PROCEDURE — 93005 ELECTROCARDIOGRAM TRACING: CPT

## 2024-12-18 PROCEDURE — 87040 BLOOD CULTURE FOR BACTERIA: CPT | Mod: ELYLAB | Performed by: STUDENT IN AN ORGANIZED HEALTH CARE EDUCATION/TRAINING PROGRAM

## 2024-12-18 PROCEDURE — 84443 ASSAY THYROID STIM HORMONE: CPT | Performed by: STUDENT IN AN ORGANIZED HEALTH CARE EDUCATION/TRAINING PROGRAM

## 2024-12-18 PROCEDURE — 82010 KETONE BODYS QUAN: CPT | Performed by: STUDENT IN AN ORGANIZED HEALTH CARE EDUCATION/TRAINING PROGRAM

## 2024-12-18 PROCEDURE — 82435 ASSAY OF BLOOD CHLORIDE: CPT | Performed by: STUDENT IN AN ORGANIZED HEALTH CARE EDUCATION/TRAINING PROGRAM

## 2024-12-18 PROCEDURE — 85025 COMPLETE CBC W/AUTO DIFF WBC: CPT | Performed by: STUDENT IN AN ORGANIZED HEALTH CARE EDUCATION/TRAINING PROGRAM

## 2024-12-18 PROCEDURE — 96375 TX/PRO/DX INJ NEW DRUG ADDON: CPT

## 2024-12-18 PROCEDURE — 83880 ASSAY OF NATRIURETIC PEPTIDE: CPT | Performed by: STUDENT IN AN ORGANIZED HEALTH CARE EDUCATION/TRAINING PROGRAM

## 2024-12-18 PROCEDURE — 71275 CT ANGIOGRAPHY CHEST: CPT | Performed by: SURGERY

## 2024-12-18 PROCEDURE — 80053 COMPREHEN METABOLIC PANEL: CPT | Performed by: STUDENT IN AN ORGANIZED HEALTH CARE EDUCATION/TRAINING PROGRAM

## 2024-12-18 RX ORDER — LORAZEPAM 2 MG/ML
1 INJECTION INTRAMUSCULAR EVERY 2 HOUR PRN
Status: DISCONTINUED | OUTPATIENT
Start: 2024-12-18 | End: 2024-12-18

## 2024-12-18 RX ORDER — ONDANSETRON HYDROCHLORIDE 2 MG/ML
4 INJECTION, SOLUTION INTRAVENOUS ONCE
Status: COMPLETED | OUTPATIENT
Start: 2024-12-18 | End: 2024-12-18

## 2024-12-18 RX ORDER — LANOLIN ALCOHOL/MO/W.PET/CERES
100 CREAM (GRAM) TOPICAL ONCE
Status: DISCONTINUED | OUTPATIENT
Start: 2024-12-18 | End: 2024-12-18

## 2024-12-18 RX ORDER — LORAZEPAM 2 MG/ML
0.5 INJECTION INTRAMUSCULAR EVERY 2 HOUR PRN
Status: DISCONTINUED | OUTPATIENT
Start: 2024-12-18 | End: 2024-12-18

## 2024-12-18 RX ORDER — FOLIC ACID 1 MG/1
1 TABLET ORAL ONCE
Status: COMPLETED | OUTPATIENT
Start: 2024-12-18 | End: 2024-12-18

## 2024-12-18 RX ORDER — THIAMINE HYDROCHLORIDE 100 MG/ML
500 INJECTION, SOLUTION INTRAMUSCULAR; INTRAVENOUS ONCE
Status: COMPLETED | OUTPATIENT
Start: 2024-12-18 | End: 2024-12-18

## 2024-12-18 RX ORDER — LORAZEPAM 2 MG/ML
2 INJECTION INTRAMUSCULAR EVERY 2 HOUR PRN
Status: DISCONTINUED | OUTPATIENT
Start: 2024-12-18 | End: 2024-12-18

## 2024-12-18 RX ORDER — SODIUM BICARBONATE 1 MEQ/ML
50 SYRINGE (ML) INTRAVENOUS ONCE
Status: COMPLETED | OUTPATIENT
Start: 2024-12-18 | End: 2024-12-18

## 2024-12-18 RX ORDER — PHENOBARBITAL SODIUM 130 MG/ML
260 INJECTION, SOLUTION INTRAMUSCULAR; INTRAVENOUS ONCE
Status: COMPLETED | OUTPATIENT
Start: 2024-12-18 | End: 2024-12-19

## 2024-12-18 RX ORDER — MORPHINE SULFATE 4 MG/ML
4 INJECTION, SOLUTION INTRAMUSCULAR; INTRAVENOUS ONCE
Status: COMPLETED | OUTPATIENT
Start: 2024-12-18 | End: 2024-12-18

## 2024-12-18 RX ORDER — DEXTROSE, SODIUM CHLORIDE, SODIUM LACTATE, POTASSIUM CHLORIDE, AND CALCIUM CHLORIDE 5; .6; .31; .03; .02 G/100ML; G/100ML; G/100ML; G/100ML; G/100ML
125 INJECTION, SOLUTION INTRAVENOUS CONTINUOUS
Status: DISCONTINUED | OUTPATIENT
Start: 2024-12-18 | End: 2024-12-19

## 2024-12-18 RX ORDER — VANCOMYCIN HYDROCHLORIDE 1 G/20ML
INJECTION, POWDER, LYOPHILIZED, FOR SOLUTION INTRAVENOUS DAILY PRN
Status: DISCONTINUED | OUTPATIENT
Start: 2024-12-18 | End: 2024-12-19

## 2024-12-18 RX ORDER — MULTIVIT-MIN/IRON FUM/FOLIC AC 7.5 MG-4
1 TABLET ORAL ONCE
Status: COMPLETED | OUTPATIENT
Start: 2024-12-18 | End: 2024-12-18

## 2024-12-18 RX ADMIN — CEFEPIME 2 G: 2 INJECTION, POWDER, FOR SOLUTION INTRAVENOUS at 22:45

## 2024-12-18 RX ADMIN — SODIUM BICARBONATE 50 MEQ: 84 INJECTION INTRAVENOUS at 22:57

## 2024-12-18 RX ADMIN — FOLIC ACID 1 MG: 1 TABLET ORAL at 22:43

## 2024-12-18 RX ADMIN — Medication 100 MG: at 22:42

## 2024-12-18 RX ADMIN — SODIUM CHLORIDE, POTASSIUM CHLORIDE, SODIUM LACTATE AND CALCIUM CHLORIDE 1000 ML: 600; 310; 30; 20 INJECTION, SOLUTION INTRAVENOUS at 22:43

## 2024-12-18 RX ADMIN — SODIUM CHLORIDE, POTASSIUM CHLORIDE, SODIUM LACTATE AND CALCIUM CHLORIDE 1000 ML: 600; 310; 30; 20 INJECTION, SOLUTION INTRAVENOUS at 23:58

## 2024-12-18 RX ADMIN — IOHEXOL 100 ML: 350 INJECTION, SOLUTION INTRAVENOUS at 21:31

## 2024-12-18 RX ADMIN — AZITHROMYCIN MONOHYDRATE 500 MG: 500 INJECTION, POWDER, LYOPHILIZED, FOR SOLUTION INTRAVENOUS at 23:50

## 2024-12-18 RX ADMIN — SODIUM CHLORIDE 1000 ML: 9 INJECTION, SOLUTION INTRAVENOUS at 23:52

## 2024-12-18 RX ADMIN — SODIUM CHLORIDE, SODIUM LACTATE, POTASSIUM CHLORIDE, CALCIUM CHLORIDE AND DEXTROSE MONOHYDRATE 125 ML/HR: 5; 600; 310; 30; 20 INJECTION, SOLUTION INTRAVENOUS at 23:39

## 2024-12-18 RX ADMIN — Medication 1 TABLET: at 22:42

## 2024-12-18 RX ADMIN — THIAMINE HYDROCHLORIDE 500 MG: 100 INJECTION, SOLUTION INTRAMUSCULAR; INTRAVENOUS at 23:03

## 2024-12-18 RX ADMIN — MORPHINE SULFATE 4 MG: 4 INJECTION, SOLUTION INTRAMUSCULAR; INTRAVENOUS at 21:42

## 2024-12-18 RX ADMIN — ONDANSETRON 4 MG: 2 INJECTION INTRAMUSCULAR; INTRAVENOUS at 21:40

## 2024-12-18 ASSESSMENT — LIFESTYLE VARIABLES
TREMOR: NO TREMOR
AUDITORY DISTURBANCES: NOT PRESENT
EVER HAD A DRINK FIRST THING IN THE MORNING TO STEADY YOUR NERVES TO GET RID OF A HANGOVER: NO
AGITATION: NORMAL ACTIVITY
HAVE PEOPLE ANNOYED YOU BY CRITICIZING YOUR DRINKING: NO
TOTAL SCORE: 0
HEADACHE, FULLNESS IN HEAD: NOT PRESENT
ORIENTATION AND CLOUDING OF SENSORIUM: ORIENTED AND CAN DO SERIAL ADDITIONS
HAVE YOU EVER FELT YOU SHOULD CUT DOWN ON YOUR DRINKING: NO
EVER FELT BAD OR GUILTY ABOUT YOUR DRINKING: NO
TOTAL SCORE: 4
NAUSEA AND VOMITING: MILD NAUSEA WITH NO VOMITING
TACTILE DISTURBANCES: MILD ITCHING, PINS AND NEEDLES, BURNING OR NUMBNESS
PAROXYSMAL SWEATS: NO SWEAT VISIBLE
ANXIETY: MILDLY ANXIOUS
VISUAL DISTURBANCES: NOT PRESENT

## 2024-12-18 ASSESSMENT — PAIN - FUNCTIONAL ASSESSMENT: PAIN_FUNCTIONAL_ASSESSMENT: 0-10

## 2024-12-18 ASSESSMENT — PAIN SCALES - GENERAL
PAINLEVEL_OUTOF10: 10 - WORST POSSIBLE PAIN
PAINLEVEL_OUTOF10: 10 - WORST POSSIBLE PAIN

## 2024-12-18 ASSESSMENT — PAIN DESCRIPTION - LOCATION
LOCATION: BACK
LOCATION: BACK

## 2024-12-18 ASSESSMENT — PAIN DESCRIPTION - PAIN TYPE: TYPE: ACUTE PAIN

## 2024-12-19 ENCOUNTER — APPOINTMENT (OUTPATIENT)
Dept: CARDIOLOGY | Facility: HOSPITAL | Age: 59
End: 2024-12-19
Payer: MEDICAID

## 2024-12-19 LAB
AMPHETAMINES UR QL SCN: ABNORMAL
ANION GAP BLDV CALCULATED.4IONS-SCNC: 14 MMOL/L (ref 10–25)
ANION GAP SERPL CALC-SCNC: 12 MMOL/L (ref 10–20)
ANION GAP SERPL CALC-SCNC: 13 MMOL/L (ref 10–20)
ANION GAP SERPL CALC-SCNC: 18 MMOL/L (ref 10–20)
ANION GAP SERPL CALC-SCNC: 31 MMOL/L (ref 10–20)
AORTIC VALVE MEAN GRADIENT: 3 MMHG
AORTIC VALVE PEAK VELOCITY: 1.24 M/S
APPEARANCE UR: CLEAR
ATRIAL RATE: 140 BPM
AV PEAK GRADIENT: 6 MMHG
AVA (PEAK VEL): 2.61 CM2
AVA (VTI): 2.76 CM2
BARBITURATES UR QL SCN: ABNORMAL
BASE EXCESS BLDV CALC-SCNC: -13.8 MMOL/L (ref -2–3)
BASOPHILS # BLD AUTO: 0.02 X10*3/UL (ref 0–0.1)
BASOPHILS NFR BLD AUTO: 0.3 %
BENZODIAZ UR QL SCN: ABNORMAL
BILIRUB UR STRIP.AUTO-MCNC: NEGATIVE MG/DL
BODY TEMPERATURE: ABNORMAL
BUN SERPL-MCNC: 12 MG/DL (ref 6–23)
BUN SERPL-MCNC: 14 MG/DL (ref 6–23)
BUN SERPL-MCNC: 14 MG/DL (ref 6–23)
BUN SERPL-MCNC: 16 MG/DL (ref 6–23)
BZE UR QL SCN: ABNORMAL
CA-I BLDV-SCNC: 1.05 MMOL/L (ref 1.1–1.33)
CALCIUM SERPL-MCNC: 7.5 MG/DL (ref 8.6–10.3)
CALCIUM SERPL-MCNC: 8.3 MG/DL (ref 8.6–10.3)
CALCIUM SERPL-MCNC: 8.7 MG/DL (ref 8.6–10.3)
CALCIUM SERPL-MCNC: 9.1 MG/DL (ref 8.6–10.3)
CANNABINOIDS UR QL SCN: ABNORMAL
CHLORIDE BLDV-SCNC: 109 MMOL/L (ref 98–107)
CHLORIDE SERPL-SCNC: 100 MMOL/L (ref 98–107)
CHLORIDE SERPL-SCNC: 101 MMOL/L (ref 98–107)
CHLORIDE SERPL-SCNC: 104 MMOL/L (ref 98–107)
CHLORIDE SERPL-SCNC: 105 MMOL/L (ref 98–107)
CO2 SERPL-SCNC: 13 MMOL/L (ref 21–32)
CO2 SERPL-SCNC: 18 MMOL/L (ref 21–32)
CO2 SERPL-SCNC: 20 MMOL/L (ref 21–32)
CO2 SERPL-SCNC: 5 MMOL/L (ref 21–32)
COLOR UR: ABNORMAL
CREAT SERPL-MCNC: 0.97 MG/DL (ref 0.5–1.05)
CREAT SERPL-MCNC: 1.07 MG/DL (ref 0.5–1.05)
CREAT SERPL-MCNC: 1.19 MG/DL (ref 0.5–1.05)
CREAT SERPL-MCNC: 1.26 MG/DL (ref 0.5–1.05)
CRITICAL CALL TIME: 823
CRITICAL CALLED BY: ABNORMAL
CRITICAL CALLED TO: ABNORMAL
CRITICAL READ BACK: ABNORMAL
EGFRCR SERPLBLD CKD-EPI 2021: 49 ML/MIN/1.73M*2
EGFRCR SERPLBLD CKD-EPI 2021: 53 ML/MIN/1.73M*2
EGFRCR SERPLBLD CKD-EPI 2021: 60 ML/MIN/1.73M*2
EGFRCR SERPLBLD CKD-EPI 2021: 67 ML/MIN/1.73M*2
EJECTION FRACTION APICAL 4 CHAMBER: 59.1
EJECTION FRACTION: 63 %
EOSINOPHIL # BLD AUTO: 0.01 X10*3/UL (ref 0–0.7)
EOSINOPHIL NFR BLD AUTO: 0.1 %
ERYTHROCYTE [DISTWIDTH] IN BLOOD BY AUTOMATED COUNT: 16 % (ref 11.5–14.5)
EST. AVERAGE GLUCOSE BLD GHB EST-MCNC: 82 MG/DL
FENTANYL+NORFENTANYL UR QL SCN: ABNORMAL
GLUCOSE BLD MANUAL STRIP-MCNC: 134 MG/DL (ref 74–99)
GLUCOSE BLD MANUAL STRIP-MCNC: 143 MG/DL (ref 74–99)
GLUCOSE BLD MANUAL STRIP-MCNC: 178 MG/DL (ref 74–99)
GLUCOSE BLD MANUAL STRIP-MCNC: 292 MG/DL (ref 74–99)
GLUCOSE BLDV-MCNC: 245 MG/DL (ref 74–99)
GLUCOSE SERPL-MCNC: 133 MG/DL (ref 74–99)
GLUCOSE SERPL-MCNC: 152 MG/DL (ref 74–99)
GLUCOSE SERPL-MCNC: 190 MG/DL (ref 74–99)
GLUCOSE SERPL-MCNC: 320 MG/DL (ref 74–99)
GLUCOSE UR STRIP.AUTO-MCNC: ABNORMAL MG/DL
HBA1C MFR BLD: 4.5 %
HCO3 BLDV-SCNC: 12.7 MMOL/L (ref 22–26)
HCT VFR BLD AUTO: 34 % (ref 36–46)
HCT VFR BLD EST: 29 % (ref 36–46)
HGB BLD-MCNC: 10.7 G/DL (ref 12–16)
HGB BLDV-MCNC: 9.6 G/DL (ref 12–16)
HOLD SPECIMEN: NORMAL
IMM GRANULOCYTES # BLD AUTO: 0.02 X10*3/UL (ref 0–0.7)
IMM GRANULOCYTES NFR BLD AUTO: 0.3 % (ref 0–0.9)
KETONES UR STRIP.AUTO-MCNC: ABNORMAL MG/DL
LACTATE BLDV-SCNC: 1.2 MMOL/L (ref 0.4–2)
LACTATE SERPL-SCNC: 1.8 MMOL/L (ref 0.4–2)
LEFT ATRIUM VOLUME AREA LENGTH INDEX BSA: 28.2 ML/M2
LEFT VENTRICLE INTERNAL DIMENSION DIASTOLE: 4.1 CM (ref 3.5–6)
LEFT VENTRICULAR OUTFLOW TRACT DIAMETER: 2 CM
LEUKOCYTE ESTERASE UR QL STRIP.AUTO: NEGATIVE
LV EJECTION FRACTION BIPLANE: 62 %
LYMPHOCYTES # BLD AUTO: 0.3 X10*3/UL (ref 1.2–4.8)
LYMPHOCYTES NFR BLD AUTO: 4.4 %
MAGNESIUM SERPL-MCNC: 1.09 MG/DL (ref 1.6–2.4)
MAGNESIUM SERPL-MCNC: 1.38 MG/DL (ref 1.6–2.4)
MAGNESIUM SERPL-MCNC: 2.39 MG/DL (ref 1.6–2.4)
MAGNESIUM SERPL-MCNC: 2.72 MG/DL (ref 1.6–2.4)
MCH RBC QN AUTO: 29.4 PG (ref 26–34)
MCHC RBC AUTO-ENTMCNC: 31.5 G/DL (ref 32–36)
MCV RBC AUTO: 93 FL (ref 80–100)
METHADONE UR QL SCN: ABNORMAL
MITRAL VALVE E/A RATIO: 1.04
MONOCYTES # BLD AUTO: 0.39 X10*3/UL (ref 0.1–1)
MONOCYTES NFR BLD AUTO: 5.7 %
MRSA DNA SPEC QL NAA+PROBE: NOT DETECTED
MUCOUS THREADS #/AREA URNS AUTO: ABNORMAL /LPF
NEUTROPHILS # BLD AUTO: 6.15 X10*3/UL (ref 1.2–7.7)
NEUTROPHILS NFR BLD AUTO: 89.2 %
NITRITE UR QL STRIP.AUTO: NEGATIVE
NRBC BLD-RTO: 0 /100 WBCS (ref 0–0)
OPIATES UR QL SCN: ABNORMAL
OXYCODONE+OXYMORPHONE UR QL SCN: ABNORMAL
OXYHGB MFR BLDV: 76.3 % (ref 45–75)
P AXIS: 77 DEGREES
P OFFSET: 203 MS
P ONSET: 151 MS
PCO2 BLDV: 31 MM HG (ref 41–51)
PCP UR QL SCN: ABNORMAL
PH BLDV: 7.22 PH (ref 7.33–7.43)
PH UR STRIP.AUTO: 5.5 [PH]
PHOSPHATE SERPL-MCNC: 1 MG/DL (ref 2.5–4.9)
PHOSPHATE SERPL-MCNC: 1.2 MG/DL (ref 2.5–4.9)
PHOSPHATE SERPL-MCNC: 2.6 MG/DL (ref 2.5–4.9)
PHOSPHATE SERPL-MCNC: 3 MG/DL (ref 2.5–4.9)
PLATELET # BLD AUTO: 167 X10*3/UL (ref 150–450)
PO2 BLDV: 39 MM HG (ref 35–45)
POTASSIUM BLDV-SCNC: 2.9 MMOL/L (ref 3.5–5.3)
POTASSIUM SERPL-SCNC: 3.3 MMOL/L (ref 3.5–5.3)
POTASSIUM SERPL-SCNC: 4 MMOL/L (ref 3.5–5.3)
POTASSIUM SERPL-SCNC: 4.1 MMOL/L (ref 3.5–5.3)
POTASSIUM SERPL-SCNC: 4.4 MMOL/L (ref 3.5–5.3)
PR INTERVAL: 136 MS
PROCALCITONIN SERPL-MCNC: 0.47 NG/ML
PROT UR STRIP.AUTO-MCNC: ABNORMAL MG/DL
Q ONSET: 219 MS
QRS COUNT: 23 BEATS
QRS DURATION: 80 MS
QT INTERVAL: 366 MS
QTC CALCULATION(BAZETT): 558 MS
QTC FREDERICIA: 485 MS
R AXIS: 64 DEGREES
RBC # BLD AUTO: 3.64 X10*6/UL (ref 4–5.2)
RBC # UR STRIP.AUTO: ABNORMAL /UL
RBC #/AREA URNS AUTO: >20 /HPF
RIGHT VENTRICLE FREE WALL PEAK S': 10.4 CM/S
RIGHT VENTRICLE PEAK SYSTOLIC PRESSURE: 51.4 MMHG
SAO2 % BLDV: 78 % (ref 45–75)
SODIUM BLDV-SCNC: 133 MMOL/L (ref 136–145)
SODIUM SERPL-SCNC: 128 MMOL/L (ref 136–145)
SODIUM SERPL-SCNC: 131 MMOL/L (ref 136–145)
SODIUM SERPL-SCNC: 132 MMOL/L (ref 136–145)
SODIUM SERPL-SCNC: 134 MMOL/L (ref 136–145)
SP GR UR STRIP.AUTO: 1.04
SQUAMOUS #/AREA URNS AUTO: ABNORMAL /HPF
T AXIS: 68 DEGREES
T OFFSET: 402 MS
T4 FREE SERPL-MCNC: 0.74 NG/DL (ref 0.61–1.12)
TRICUSPID ANNULAR PLANE SYSTOLIC EXCURSION: 2 CM
UROBILINOGEN UR STRIP.AUTO-MCNC: NORMAL MG/DL
VENTRICULAR RATE: 140 BPM
WBC # BLD AUTO: 6.9 X10*3/UL (ref 4.4–11.3)
WBC #/AREA URNS AUTO: ABNORMAL /HPF

## 2024-12-19 PROCEDURE — 2500000004 HC RX 250 GENERAL PHARMACY W/ HCPCS (ALT 636 FOR OP/ED): Performed by: STUDENT IN AN ORGANIZED HEALTH CARE EDUCATION/TRAINING PROGRAM

## 2024-12-19 PROCEDURE — 2500000002 HC RX 250 W HCPCS SELF ADMINISTERED DRUGS (ALT 637 FOR MEDICARE OP, ALT 636 FOR OP/ED)

## 2024-12-19 PROCEDURE — 36415 COLL VENOUS BLD VENIPUNCTURE: CPT | Performed by: STUDENT IN AN ORGANIZED HEALTH CARE EDUCATION/TRAINING PROGRAM

## 2024-12-19 PROCEDURE — 2500000005 HC RX 250 GENERAL PHARMACY W/O HCPCS

## 2024-12-19 PROCEDURE — 2020000001 HC ICU ROOM DAILY

## 2024-12-19 PROCEDURE — 93306 TTE W/DOPPLER COMPLETE: CPT

## 2024-12-19 PROCEDURE — 94640 AIRWAY INHALATION TREATMENT: CPT

## 2024-12-19 PROCEDURE — 87449 NOS EACH ORGANISM AG IA: CPT | Mod: ELYLAB | Performed by: STUDENT IN AN ORGANIZED HEALTH CARE EDUCATION/TRAINING PROGRAM

## 2024-12-19 PROCEDURE — 83735 ASSAY OF MAGNESIUM: CPT | Performed by: STUDENT IN AN ORGANIZED HEALTH CARE EDUCATION/TRAINING PROGRAM

## 2024-12-19 PROCEDURE — 2500000005 HC RX 250 GENERAL PHARMACY W/O HCPCS: Performed by: STUDENT IN AN ORGANIZED HEALTH CARE EDUCATION/TRAINING PROGRAM

## 2024-12-19 PROCEDURE — 2500000004 HC RX 250 GENERAL PHARMACY W/ HCPCS (ALT 636 FOR OP/ED)

## 2024-12-19 PROCEDURE — 2500000002 HC RX 250 W HCPCS SELF ADMINISTERED DRUGS (ALT 637 FOR MEDICARE OP, ALT 636 FOR OP/ED): Performed by: STUDENT IN AN ORGANIZED HEALTH CARE EDUCATION/TRAINING PROGRAM

## 2024-12-19 PROCEDURE — 82947 ASSAY GLUCOSE BLOOD QUANT: CPT

## 2024-12-19 PROCEDURE — 83605 ASSAY OF LACTIC ACID: CPT | Performed by: STUDENT IN AN ORGANIZED HEALTH CARE EDUCATION/TRAINING PROGRAM

## 2024-12-19 PROCEDURE — 87899 AGENT NOS ASSAY W/OPTIC: CPT | Mod: ELYLAB | Performed by: STUDENT IN AN ORGANIZED HEALTH CARE EDUCATION/TRAINING PROGRAM

## 2024-12-19 PROCEDURE — 82435 ASSAY OF BLOOD CHLORIDE: CPT

## 2024-12-19 PROCEDURE — 84100 ASSAY OF PHOSPHORUS: CPT | Performed by: STUDENT IN AN ORGANIZED HEALTH CARE EDUCATION/TRAINING PROGRAM

## 2024-12-19 PROCEDURE — 99291 CRITICAL CARE FIRST HOUR: CPT

## 2024-12-19 PROCEDURE — 2500000001 HC RX 250 WO HCPCS SELF ADMINISTERED DRUGS (ALT 637 FOR MEDICARE OP): Performed by: STUDENT IN AN ORGANIZED HEALTH CARE EDUCATION/TRAINING PROGRAM

## 2024-12-19 PROCEDURE — 93306 TTE W/DOPPLER COMPLETE: CPT | Performed by: INTERNAL MEDICINE

## 2024-12-19 PROCEDURE — 80307 DRUG TEST PRSMV CHEM ANLYZR: CPT | Performed by: STUDENT IN AN ORGANIZED HEALTH CARE EDUCATION/TRAINING PROGRAM

## 2024-12-19 PROCEDURE — 87641 MR-STAPH DNA AMP PROBE: CPT | Performed by: STUDENT IN AN ORGANIZED HEALTH CARE EDUCATION/TRAINING PROGRAM

## 2024-12-19 PROCEDURE — 81001 URINALYSIS AUTO W/SCOPE: CPT | Performed by: STUDENT IN AN ORGANIZED HEALTH CARE EDUCATION/TRAINING PROGRAM

## 2024-12-19 PROCEDURE — 80048 BASIC METABOLIC PNL TOTAL CA: CPT | Performed by: STUDENT IN AN ORGANIZED HEALTH CARE EDUCATION/TRAINING PROGRAM

## 2024-12-19 PROCEDURE — 85025 COMPLETE CBC W/AUTO DIFF WBC: CPT | Performed by: STUDENT IN AN ORGANIZED HEALTH CARE EDUCATION/TRAINING PROGRAM

## 2024-12-19 RX ORDER — LANOLIN ALCOHOL/MO/W.PET/CERES
400 CREAM (GRAM) TOPICAL 2 TIMES DAILY
Status: DISCONTINUED | OUTPATIENT
Start: 2024-12-19 | End: 2024-12-22 | Stop reason: HOSPADM

## 2024-12-19 RX ORDER — DEXTROSE 50 % IN WATER (D50W) INTRAVENOUS SYRINGE
12.5
Status: DISCONTINUED | OUTPATIENT
Start: 2024-12-19 | End: 2024-12-20

## 2024-12-19 RX ORDER — FOLIC ACID 1 MG/1
1 TABLET ORAL DAILY
Status: DISCONTINUED | OUTPATIENT
Start: 2024-12-19 | End: 2024-12-22 | Stop reason: HOSPADM

## 2024-12-19 RX ORDER — PHENOBARBITAL SODIUM 65 MG/ML
130 INJECTION, SOLUTION INTRAMUSCULAR; INTRAVENOUS
Status: DISCONTINUED | OUTPATIENT
Start: 2024-12-19 | End: 2024-12-22 | Stop reason: HOSPADM

## 2024-12-19 RX ORDER — MIRTAZAPINE 15 MG/1
15 TABLET, FILM COATED ORAL NIGHTLY
Status: DISCONTINUED | OUTPATIENT
Start: 2024-12-19 | End: 2024-12-22 | Stop reason: HOSPADM

## 2024-12-19 RX ORDER — DICLOFENAC SODIUM 10 MG/G
2 GEL TOPICAL 2 TIMES DAILY PRN
Status: DISCONTINUED | OUTPATIENT
Start: 2024-12-19 | End: 2024-12-22 | Stop reason: HOSPADM

## 2024-12-19 RX ORDER — GABAPENTIN 300 MG/1
300 CAPSULE ORAL ONCE
Status: COMPLETED | OUTPATIENT
Start: 2024-12-19 | End: 2024-12-19

## 2024-12-19 RX ORDER — MONTELUKAST SODIUM 10 MG/1
10 TABLET ORAL NIGHTLY
Status: DISCONTINUED | OUTPATIENT
Start: 2024-12-19 | End: 2024-12-22 | Stop reason: HOSPADM

## 2024-12-19 RX ORDER — BUPROPION HYDROCHLORIDE 150 MG/1
150 TABLET ORAL DAILY
Status: DISCONTINUED | OUTPATIENT
Start: 2024-12-19 | End: 2024-12-22 | Stop reason: HOSPADM

## 2024-12-19 RX ORDER — DEXTROSE 50 % IN WATER (D50W) INTRAVENOUS SYRINGE
25
Status: DISCONTINUED | OUTPATIENT
Start: 2024-12-19 | End: 2024-12-20

## 2024-12-19 RX ORDER — GABAPENTIN 300 MG/1
300 CAPSULE ORAL NIGHTLY
Status: DISCONTINUED | OUTPATIENT
Start: 2024-12-19 | End: 2024-12-21

## 2024-12-19 RX ORDER — MAGNESIUM SULFATE HEPTAHYDRATE 40 MG/ML
2 INJECTION, SOLUTION INTRAVENOUS ONCE
Status: COMPLETED | OUTPATIENT
Start: 2024-12-19 | End: 2024-12-19

## 2024-12-19 RX ORDER — LIDOCAINE 560 MG/1
1 PATCH PERCUTANEOUS; TOPICAL; TRANSDERMAL DAILY
Status: DISCONTINUED | OUTPATIENT
Start: 2024-12-19 | End: 2024-12-22 | Stop reason: HOSPADM

## 2024-12-19 RX ORDER — DEXTROSE MONOHYDRATE AND SODIUM CHLORIDE 5; .9 G/100ML; G/100ML
100 INJECTION, SOLUTION INTRAVENOUS CONTINUOUS
Status: ACTIVE | OUTPATIENT
Start: 2024-12-19 | End: 2024-12-20

## 2024-12-19 RX ORDER — LIDOCAINE 560 MG/1
1 PATCH PERCUTANEOUS; TOPICAL; TRANSDERMAL DAILY
Status: DISCONTINUED | OUTPATIENT
Start: 2024-12-20 | End: 2024-12-19

## 2024-12-19 RX ORDER — SIMETHICONE 80 MG
80 TABLET,CHEWABLE ORAL ONCE
Status: COMPLETED | OUTPATIENT
Start: 2024-12-19 | End: 2024-12-19

## 2024-12-19 RX ORDER — LANOLIN ALCOHOL/MO/W.PET/CERES
100 CREAM (GRAM) TOPICAL DAILY
Status: DISCONTINUED | OUTPATIENT
Start: 2024-12-19 | End: 2024-12-22 | Stop reason: HOSPADM

## 2024-12-19 RX ORDER — PANTOPRAZOLE SODIUM 40 MG/1
40 TABLET, DELAYED RELEASE ORAL DAILY
Status: DISCONTINUED | OUTPATIENT
Start: 2024-12-19 | End: 2024-12-22 | Stop reason: HOSPADM

## 2024-12-19 RX ORDER — INSULIN LISPRO 100 [IU]/ML
0-10 INJECTION, SOLUTION INTRAVENOUS; SUBCUTANEOUS
Status: DISCONTINUED | OUTPATIENT
Start: 2024-12-19 | End: 2024-12-22 | Stop reason: HOSPADM

## 2024-12-19 RX ORDER — METOPROLOL TARTRATE 50 MG/1
50 TABLET ORAL 2 TIMES DAILY
Status: DISCONTINUED | OUTPATIENT
Start: 2024-12-19 | End: 2024-12-22 | Stop reason: HOSPADM

## 2024-12-19 RX ORDER — MELOXICAM 7.5 MG/1
15 TABLET ORAL
Status: DISCONTINUED | OUTPATIENT
Start: 2024-12-19 | End: 2024-12-22 | Stop reason: HOSPADM

## 2024-12-19 RX ORDER — IPRATROPIUM BROMIDE AND ALBUTEROL SULFATE 2.5; .5 MG/3ML; MG/3ML
3 SOLUTION RESPIRATORY (INHALATION)
Status: DISCONTINUED | OUTPATIENT
Start: 2024-12-19 | End: 2024-12-20

## 2024-12-19 RX ORDER — VIT C/E/ZN/COPPR/LUTEIN/ZEAXAN 250MG-90MG
1000 CAPSULE ORAL DAILY
Status: DISCONTINUED | OUTPATIENT
Start: 2024-12-19 | End: 2024-12-22 | Stop reason: HOSPADM

## 2024-12-19 RX ADMIN — LIDOCAINE 4% 1 PATCH: 40 PATCH TOPICAL at 23:08

## 2024-12-19 RX ADMIN — PIPERACILLIN SODIUM AND TAZOBACTAM SODIUM 3.38 G: 3; .375 INJECTION, SOLUTION INTRAVENOUS at 18:04

## 2024-12-19 RX ADMIN — PHENOBARBITAL SODIUM 260 MG: 130 INJECTION INTRAMUSCULAR; INTRAVENOUS at 00:15

## 2024-12-19 RX ADMIN — Medication 3 L/MIN: at 07:28

## 2024-12-19 RX ADMIN — GABAPENTIN 300 MG: 300 CAPSULE ORAL at 06:05

## 2024-12-19 RX ADMIN — IPRATROPIUM BROMIDE AND ALBUTEROL SULFATE 3 ML: 2.5; .5 SOLUTION RESPIRATORY (INHALATION) at 19:28

## 2024-12-19 RX ADMIN — IPRATROPIUM BROMIDE AND ALBUTEROL SULFATE 3 ML: 2.5; .5 SOLUTION RESPIRATORY (INHALATION) at 07:28

## 2024-12-19 RX ADMIN — METOPROLOL TARTRATE 50 MG: 50 TABLET, FILM COATED ORAL at 08:49

## 2024-12-19 RX ADMIN — FOLIC ACID 1 MG: 1 TABLET ORAL at 08:49

## 2024-12-19 RX ADMIN — Medication 1 L/MIN: at 22:00

## 2024-12-19 RX ADMIN — PIPERACILLIN SODIUM AND TAZOBACTAM SODIUM 3.38 G: 3; .375 INJECTION, SOLUTION INTRAVENOUS at 10:05

## 2024-12-19 RX ADMIN — MIRTAZAPINE 15 MG: 15 TABLET, FILM COATED ORAL at 21:23

## 2024-12-19 RX ADMIN — PANTOPRAZOLE SODIUM 40 MG: 40 TABLET, DELAYED RELEASE ORAL at 05:00

## 2024-12-19 RX ADMIN — DEXTROSE AND SODIUM CHLORIDE 100 ML/HR: 5; 900 INJECTION, SOLUTION INTRAVENOUS at 08:30

## 2024-12-19 RX ADMIN — BUPROPION HYDROCHLORIDE 150 MG: 150 TABLET, EXTENDED RELEASE ORAL at 08:48

## 2024-12-19 RX ADMIN — POTASSIUM PHOSPHATE, MONOBASIC AND POTASSIUM PHOSPHATE, DIBASIC 30 MMOL: 224; 236 INJECTION, SOLUTION, CONCENTRATE INTRAVENOUS at 18:54

## 2024-12-19 RX ADMIN — INSULIN LISPRO 6 UNITS: 100 INJECTION, SOLUTION INTRAVENOUS; SUBCUTANEOUS at 08:32

## 2024-12-19 RX ADMIN — SODIUM PHOSPHATE, MONOBASIC, MONOHYDRATE AND SODIUM PHOSPHATE, DIBASIC, ANHYDROUS 21 MMOL: 142; 276 INJECTION, SOLUTION INTRAVENOUS at 10:53

## 2024-12-19 RX ADMIN — PIPERACILLIN SODIUM AND TAZOBACTAM SODIUM 3.38 G: 3; .375 INJECTION, SOLUTION INTRAVENOUS at 05:00

## 2024-12-19 RX ADMIN — MONTELUKAST SODIUM 10 MG: 10 TABLET, FILM COATED ORAL at 21:23

## 2024-12-19 RX ADMIN — Medication 1000 MCG: at 08:49

## 2024-12-19 RX ADMIN — Medication 1 L/MIN: at 19:00

## 2024-12-19 RX ADMIN — MAGNESIUM SULFATE HEPTAHYDRATE 2 G: 40 INJECTION, SOLUTION INTRAVENOUS at 06:05

## 2024-12-19 RX ADMIN — GABAPENTIN 300 MG: 300 CAPSULE ORAL at 21:23

## 2024-12-19 RX ADMIN — MAGNESIUM SULFATE HEPTAHYDRATE 2 G: 40 INJECTION, SOLUTION INTRAVENOUS at 08:49

## 2024-12-19 RX ADMIN — IPRATROPIUM BROMIDE AND ALBUTEROL SULFATE 3 ML: 2.5; .5 SOLUTION RESPIRATORY (INHALATION) at 13:12

## 2024-12-19 RX ADMIN — Medication 100 MG: at 08:49

## 2024-12-19 RX ADMIN — SIMETHICONE 80 MG: 80 TABLET, CHEWABLE ORAL at 21:29

## 2024-12-19 RX ADMIN — VANCOMYCIN HYDROCHLORIDE 1750 MG: 5 INJECTION, POWDER, LYOPHILIZED, FOR SOLUTION INTRAVENOUS at 01:45

## 2024-12-19 SDOH — ECONOMIC STABILITY: INCOME INSECURITY: IN THE PAST 12 MONTHS HAS THE ELECTRIC, GAS, OIL, OR WATER COMPANY THREATENED TO SHUT OFF SERVICES IN YOUR HOME?: NO

## 2024-12-19 SDOH — SOCIAL STABILITY: SOCIAL INSECURITY: DOES ANYONE TRY TO KEEP YOU FROM HAVING/CONTACTING OTHER FRIENDS OR DOING THINGS OUTSIDE YOUR HOME?: NO

## 2024-12-19 SDOH — SOCIAL STABILITY: SOCIAL INSECURITY: WITHIN THE LAST YEAR, HAVE YOU BEEN HUMILIATED OR EMOTIONALLY ABUSED IN OTHER WAYS BY YOUR PARTNER OR EX-PARTNER?: NO

## 2024-12-19 SDOH — ECONOMIC STABILITY: HOUSING INSECURITY: AT ANY TIME IN THE PAST 12 MONTHS, WERE YOU HOMELESS OR LIVING IN A SHELTER (INCLUDING NOW)?: PATIENT DECLINED

## 2024-12-19 SDOH — SOCIAL STABILITY: SOCIAL INSECURITY: WERE YOU ABLE TO COMPLETE ALL THE BEHAVIORAL HEALTH SCREENINGS?: YES

## 2024-12-19 SDOH — ECONOMIC STABILITY: HOUSING INSECURITY: IN THE LAST 12 MONTHS, WAS THERE A TIME WHEN YOU WERE NOT ABLE TO PAY THE MORTGAGE OR RENT ON TIME?: PATIENT DECLINED

## 2024-12-19 SDOH — SOCIAL STABILITY: SOCIAL INSECURITY: ABUSE: ADULT

## 2024-12-19 SDOH — ECONOMIC STABILITY: FOOD INSECURITY
HOW HARD IS IT FOR YOU TO PAY FOR THE VERY BASICS LIKE FOOD, HOUSING, MEDICAL CARE, AND HEATING?: PATIENT UNABLE TO ANSWER

## 2024-12-19 SDOH — SOCIAL STABILITY: SOCIAL INSECURITY: WITHIN THE LAST YEAR, HAVE YOU BEEN AFRAID OF YOUR PARTNER OR EX-PARTNER?: NO

## 2024-12-19 SDOH — SOCIAL STABILITY: SOCIAL INSECURITY: DO YOU FEEL ANYONE HAS EXPLOITED OR TAKEN ADVANTAGE OF YOU FINANCIALLY OR OF YOUR PERSONAL PROPERTY?: NO

## 2024-12-19 SDOH — SOCIAL STABILITY: SOCIAL INSECURITY: ARE YOU OR HAVE YOU BEEN THREATENED OR ABUSED PHYSICALLY, EMOTIONALLY, OR SEXUALLY BY ANYONE?: NO

## 2024-12-19 SDOH — SOCIAL STABILITY: SOCIAL INSECURITY: HAVE YOU HAD THOUGHTS OF HARMING ANYONE ELSE?: NO

## 2024-12-19 SDOH — ECONOMIC STABILITY: HOUSING INSECURITY: IN THE PAST 12 MONTHS, HOW MANY TIMES HAVE YOU MOVED WHERE YOU WERE LIVING?: 0

## 2024-12-19 SDOH — SOCIAL STABILITY: SOCIAL INSECURITY
WITHIN THE LAST YEAR, HAVE YOU BEEN RAPED OR FORCED TO HAVE ANY KIND OF SEXUAL ACTIVITY BY YOUR PARTNER OR EX-PARTNER?: NO

## 2024-12-19 SDOH — SOCIAL STABILITY: SOCIAL INSECURITY
WITHIN THE LAST YEAR, HAVE YOU BEEN KICKED, HIT, SLAPPED, OR OTHERWISE PHYSICALLY HURT BY YOUR PARTNER OR EX-PARTNER?: NO

## 2024-12-19 SDOH — SOCIAL STABILITY: SOCIAL INSECURITY: ARE THERE ANY APPARENT SIGNS OF INJURIES/BEHAVIORS THAT COULD BE RELATED TO ABUSE/NEGLECT?: NO

## 2024-12-19 SDOH — SOCIAL STABILITY: SOCIAL INSECURITY: DO YOU FEEL UNSAFE GOING BACK TO THE PLACE WHERE YOU ARE LIVING?: NO

## 2024-12-19 SDOH — SOCIAL STABILITY: SOCIAL INSECURITY: HAVE YOU HAD ANY THOUGHTS OF HARMING ANYONE ELSE?: NO

## 2024-12-19 SDOH — ECONOMIC STABILITY: TRANSPORTATION INSECURITY
IN THE PAST 12 MONTHS, HAS LACK OF TRANSPORTATION KEPT YOU FROM MEDICAL APPOINTMENTS OR FROM GETTING MEDICATIONS?: PATIENT DECLINED

## 2024-12-19 SDOH — ECONOMIC STABILITY: FOOD INSECURITY: WITHIN THE PAST 12 MONTHS, THE FOOD YOU BOUGHT JUST DIDN'T LAST AND YOU DIDN'T HAVE MONEY TO GET MORE.: PATIENT DECLINED

## 2024-12-19 SDOH — ECONOMIC STABILITY: FOOD INSECURITY
WITHIN THE PAST 12 MONTHS, YOU WORRIED THAT YOUR FOOD WOULD RUN OUT BEFORE YOU GOT THE MONEY TO BUY MORE.: PATIENT DECLINED

## 2024-12-19 SDOH — SOCIAL STABILITY: SOCIAL INSECURITY: HAS ANYONE EVER THREATENED TO HURT YOUR FAMILY OR YOUR PETS?: NO

## 2024-12-19 ASSESSMENT — COGNITIVE AND FUNCTIONAL STATUS - GENERAL
MOBILITY SCORE: 24
DAILY ACTIVITIY SCORE: 24
MOBILITY SCORE: 24
DAILY ACTIVITIY SCORE: 24
PATIENT BASELINE BEDBOUND: NO

## 2024-12-19 ASSESSMENT — ACTIVITIES OF DAILY LIVING (ADL)
ASSISTIVE_DEVICE: CANE;WALKER
LACK_OF_TRANSPORTATION: PATIENT DECLINED
DRESSING YOURSELF: INDEPENDENT
HEARING - LEFT EAR: FUNCTIONAL
HEARING - RIGHT EAR: FUNCTIONAL
GROOMING: INDEPENDENT
PATIENT'S MEMORY ADEQUATE TO SAFELY COMPLETE DAILY ACTIVITIES?: YES
TOILETING: INDEPENDENT
ADEQUATE_TO_COMPLETE_ADL: NO
WALKS IN HOME: INDEPENDENT
FEEDING YOURSELF: INDEPENDENT
LACK_OF_TRANSPORTATION: PATIENT DECLINED
JUDGMENT_ADEQUATE_SAFELY_COMPLETE_DAILY_ACTIVITIES: YES
BATHING: INDEPENDENT

## 2024-12-19 ASSESSMENT — PAIN SCALES - GENERAL
PAINLEVEL_OUTOF10: 6
PAINLEVEL_OUTOF10: 2
PAINLEVEL_OUTOF10: 0 - NO PAIN
PAINLEVEL_OUTOF10: 4

## 2024-12-19 ASSESSMENT — PAIN - FUNCTIONAL ASSESSMENT
PAIN_FUNCTIONAL_ASSESSMENT: 0-10

## 2024-12-19 ASSESSMENT — LIFESTYLE VARIABLES
HOW OFTEN DO YOU HAVE 6 OR MORE DRINKS ON ONE OCCASION: NEVER
HOW OFTEN DO YOU HAVE A DRINK CONTAINING ALCOHOL: NEVER
SKIP TO QUESTIONS 9-10: 1
SUBSTANCE_ABUSE_PAST_12_MONTHS: NO
AUDIT-C TOTAL SCORE: 0
AUDIT-C TOTAL SCORE: 0
HOW MANY STANDARD DRINKS CONTAINING ALCOHOL DO YOU HAVE ON A TYPICAL DAY: PATIENT DOES NOT DRINK
PRESCIPTION_ABUSE_PAST_12_MONTHS: NO

## 2024-12-19 ASSESSMENT — PATIENT HEALTH QUESTIONNAIRE - PHQ9
2. FEELING DOWN, DEPRESSED OR HOPELESS: NOT AT ALL
1. LITTLE INTEREST OR PLEASURE IN DOING THINGS: NOT AT ALL
SUM OF ALL RESPONSES TO PHQ9 QUESTIONS 1 & 2: 0

## 2024-12-19 NOTE — CONSULTS
Social Work Note  The LSW participated in the SICU interdisciplinary rounds this date. The pt is admitted with alcohol ketoacidosis. The LSW has made a referral to the Substance Use Navigator Specialist  re: the pt's history of alcohol abuse and follow up.

## 2024-12-19 NOTE — PROGRESS NOTES
"[facility] Critical Care Medicine       Date:  12/19/2024  Patient:  Jolanta Castaneda  YOB: 1965  MRN:  [id number]   Admit Date:  12/18/2024  ========================================================================================================    Chief Complaint   Patient presents with    Chest Pain     Patient BIB EMS from home with complaints of \"ill feeling, weakness x 3-4 days\". Pt lives alone. A&Ox4.         History of Present Illness:  Jolanta Castaneda is a 59 y.o. year old female patient with Past Medical History of gastric ulcer, hypertension, COPD, peripheral neuropathy, and malnutrition with past surgical history of cholecystectomy and whom is legally blind who presents with chest pain and vomiting. Pt states that about 3 days ago on Monday she had one glass of vodka which she rarely has and since then has had a lack of appetite. Her last meal was on Sunday and she tried having soda (root beer) but it did not agree with her and she has been having intermittent episodes of vomiting. She states that prior to this vodka consumption on Sunday she had not had anything to drink in at least 2 weeks if not longer and that she does not drink regularly but only on occasion and has never had withdrawal or hospitalization for alcohol use. She also denies other illicit drug use. She denies fevers, coughing, shortness of breath, dysuria, and currently does not have abdominal pain but she did have lower chest and upper abdominal pain prior to coming in. Pt denies headaches and neck pain. Pt also denies being on metformin, Jardiance, or other SGLTs, and insulin and denies all history of diabetes.     In the ER the patient was tachycardic, hypertensive, tachypneic and mildly hypoxemic requiring NC 2-3L. Pt was found to have a Cr of 1.43 (Baseline approx 0.5), bicarb of 5, AG of 32, Beta-hydroxy of 12, Glucose of 145, and pH of 6.9 with a lactic acid of 2.3 and lipase of 121, trop stable at 8-7 and EKG with sinus " tachycardia. Pt was ordered 1L of LR, Cefepime, vancomycin, and azithromycin after CTA did not show dissection or PE but should some multifocal small areas of ground glass possibly due to PNA. Due to IV access issues patient had only received approximately 300cc of LR, morphine, bicarb, and thiamine, and is pending Abx administration at this time and rest of IV fluids. ICU was called for severe acidosis and ER had ordered phenobarbital for possible ETOH withdrawal.        Interval ICU Events:  12/19: admitted to the ICU overnight with metabolic acidosis starvation ketosis. Lab work improved overnight. Glucose 320 this morning on D5LR, sodium 128 - will switch fluids to D5NS, hypophosphatemia, and hypomagnesemia - will replace.     Medical History:  No past medical history on file.  Past Surgical History:   Procedure Laterality Date    OTHER SURGICAL HISTORY  10/27/2020    Hysterectomy    OTHER SURGICAL HISTORY  10/27/2020    Appendectomy    OTHER SURGICAL HISTORY  10/27/2020    Gallbladder surgery    OTHER SURGICAL HISTORY  10/27/2020    Glossectomy     Medications Prior to Admission   Medication Sig Dispense Refill Last Dose/Taking    buPROPion XL (Wellbutrin XL) 150 mg 24 hr tablet Take 1 tablet (150 mg) by mouth once daily. Do not crush, chew, or split.   Unknown    cyanocobalamin (Vitamin B-12) 1,000 mcg tablet Take 1 tablet (1,000 mcg) by mouth once daily.   Unknown    diclofenac sodium (Voltaren) 1 % gel Apply 2.25 inches (2 g) topically 2 times a day as needed (To affected joints).   Unknown    folic acid (Folvite) 1 mg tablet Take 1 tablet (1 mg) by mouth once daily. Do not start before March 28, 2024.   Unknown    gabapentin (Neurontin) 300 mg capsule Take 1 capsule (300 mg) by mouth once daily at bedtime.   Unknown    lubricating eye drops ophthalmic solution Administer 1 drop into both eyes 2 times a day as needed for dry eyes.   Unknown    magnesium oxide (Mag-Ox) 400 mg (241.3 mg magnesium) tablet Take  "1 tablet (400 mg) by mouth 2 times a day.   Unknown    meloxicam (Mobic) 15 mg tablet Take 1 tablet (15 mg) by mouth once daily with breakfast. 30 tablet 2 Unknown    metoprolol tartrate (Lopressor) 50 mg tablet Take 1 tablet by mouth 2 times a day.   Unknown    mirtazapine (Remeron) 15 mg tablet Take 1 tablet (15 mg) by mouth once daily at bedtime.   Unknown    montelukast (Singulair) 10 mg tablet Take 1 tablet (10 mg) by mouth once daily at bedtime.   Unknown    multivitamin with minerals tablet Take 1 tablet by mouth once daily. Do not start before March 28, 2024.   Unknown    pantoprazole (ProtoNix) 40 mg EC tablet Take 1 tablet (40 mg) by mouth once daily in the morning. Take before meals. Do not crush, chew, or split.   Unknown    thiamine (Vitamin B-1) 100 mg tablet Take 1 tablet (100 mg) by mouth once daily. Do not start before March 28, 2024.   Unknown     Patient has no known allergies.  Social History     Tobacco Use    Smoking status: Every Day     Current packs/day: 0.50     Types: Cigarettes   Substance Use Topics    Alcohol use: Yes    Drug use: Defer     Family History   Problem Relation Name Age of Onset    Coronary artery disease Mother      Other (ruptured cerebral aneurysm) Mother      Other (ruptured cerebral aneurysm) Father      Coronary artery disease Father      Alzheimer's disease Maternal Grandmother         Review of Systems:  14 point review of systems was completed and negative except for those specially mention in my HPI    Physical Exam:    Heart Rate:  []   Temp:  [36 °C (96.8 °F)-37 °C (98.6 °F)]   Resp:  [13-30]   BP: (115-175)/(66-90)   Height:  [175.3 cm (5' 9\")]   Weight:  [59 kg (130 lb 1.1 oz)-68 kg (150 lb)]   SpO2:  [88 %-100 %]     Physical Exam  Constitutional:       Appearance: She is ill-appearing.   HENT:      Head: Normocephalic.      Mouth/Throat:      Mouth: Mucous membranes are dry.   Eyes:      Pupils: Pupils are equal, round, and reactive to light. "   Cardiovascular:      Rate and Rhythm: Regular rhythm. Tachycardia present.      Pulses: Normal pulses.      Heart sounds: Normal heart sounds.   Pulmonary:      Effort: Pulmonary effort is normal.      Breath sounds: Normal breath sounds.   Abdominal:      General: Abdomen is flat. Bowel sounds are normal.      Palpations: Abdomen is soft.   Musculoskeletal:         General: Normal range of motion.   Skin:     General: Skin is warm and dry.      Capillary Refill: Capillary refill takes less than 2 seconds.   Neurological:      Mental Status: She is oriented to person, place, and time and easily aroused. She is lethargic.         Objective:    I have reviewed all medications, laboratory results, and imaging pertinent for today's encounter    Assessment/Plan:    I am currently managing this critically ill patient for the following problems:    Neuro/Psych/Pain Ctrl/Sedation:  #Alcohol use - low suspicion for Alcohol withdrawal at this time  - Pain control as needed with PRN Tylenol and PRN opioids as needed  - CAM-ICU & Delirium Precautions  - Will add PRN phenobarb and CIWA scoring as needed  - PT/OT evaluation   - C/W Patient's bupropion XL, folic acid, multivitamin, mirtazapine, and thiamine supplements  - C/W Home gabapentin     Respiratory/ENT:  #Tachypnea  #Mild Hypoxemia Resp failure  #Possible PNA vs aspiration pneumonitis  #History of COPD currently without exacerbation  -Supplemental Oxygen as needed to maintain an SpO2>90%  -Incentive spirometry and Early mobilization for lung volume expansion  - Will add Duoneb Q6H for over night and then likely transition to Q6H PRN   - No need for steroids at this time  - Abx as below in ID section     Cardiovascular:  #HTN  #Hypovolemia  - Will continue with IV fluid resuscitation  - C/W Tele monitoring  - Will restart her home BB as some of the tachycardia could be related to BB withdrawal and rebound but most likely a lot related to severe dehydration  - Echo  ordered     Renal/Volume Status (Intra & Extravascular):  #ADELE - due to pre-renal etiology of hypovolemia  - Will giver addition 1L of LR and 1L of NS  - D5 normal saline for 24 hours  - Monitor BMPs/Mg/Phos Q6H for now  - Repeat lactic acid in AM  - Monitor Cr Q6H with BMPs  - Monitor UOP Q4H  - Avoid nephrotoxic drugs and renally dose all medications     GI:  #Vomiting  #History of Gastric Ulcer  - C/W Home PPI  - Sips and chips for now     Infectious Disease:  #Possible PNA vs aspiration pneumonitis  - Will Check procal and if <0.25 can discontinue Abx  - Will check MRSA PCR and if negative will discontinue Vancomycin  - Continue Zosyn for now  - Will follow-up blood cultures and narrow Abx as needed     Heme/Onc:  - No current active issues  - Monitor Hgb Daily and transfuse for Hgb<7 or bleeding with hemodynamic instability  - Pt liekly very dry as baseline Hgb around 7-9 and currently 12 and likely hemo concentrated  - HSQ for VTE PPx     Endocrine  #Starvation ketosis  #Low TSH  - D5 normal saline  - Check Finger sticks Q4H for now  - Add ISS as needed  - Finger sticks goals 100-180  - Will add T4     Ethics/Code Status:  - Full code     :  DVT Prophylaxis: HSQ  GI Prophylaxis: Yes - PPI  Bowel Regimen: no  Diet: clear liquid diet  CVC: None  [place]: None  [place]: External catheter  Restraints: None  Dispo: ICU    Critical Care Time:  35 minutes spent in preparing to see patient (I.e. review of medical records), evaluation of diagnostics (I.e. labs, imaging, etc.), documentation, discussing plan of care with patient/ family/ caregiver, and/ or coordination of care with multidisciplinary team. Time does not include completion of procedure time.      Kaye Campos, APRN-CNP

## 2024-12-19 NOTE — CONSULTS
"Nutrition Initial Assessment:   Nutrition Assessment    Reason for Assessment: Provider consult order    Patient is a 59 y.o. female presenting with alcoholic ketoacidosis  Past Medical History of gastric ulcer, hypertension, COPD, peripheral neuropathy, and malnutrition with past surgical history of cholecystectomy and whom is legally blind who presents with chest pain and vomiting     Nutrition History:  Energy Intake:  (no meal intakes recorded at this time, diet advanced to clear liquids this morning)  Food and Nutrient History: RDN consult for nutrition assessment/recommendation. Met with pt who reports not eating since Sunday, reports having nausea and vomiting. Pt states nausea has improved, eating small amount of clear liquid tray at visit. Pt states she has been gaining wt recently, states  lbs. Pt denies any chewing or swallowing difficulty. Discussed adding Ensure Clear and Gelatine while on clear liquids - pt agreeable. Will follow for diet advancement, pt has had Ensure in the past and agreeable to adding to meals if po intake remains poor. Will continue to monitor.  Vitamin/Herbal Supplement Use: B12, folvite, mag-ox, MVI, thiamine per home med list  Food Allergies/Intolerances:  None  GI Symptoms: Nausea  Oral Problems: None       Anthropometrics:  Height: 175.3 cm (5' 9\")   Weight: 59 kg (130 lb 1.1 oz)   BMI (Calculated): 19.2  IBW/kg (Dietitian Calculated): 65.9 kg  Percent of IBW: 90 %       Weight History:   Wt Readings from Last 10 Encounters:   12/19/24 59 kg (130 lb 1.1 oz)   03/25/24 52.2 kg (115 lb)   02/03/23 54 kg (119 lb)   12/02/22 52.6 kg (116 lb)   10/28/22 53.5 kg (118 lb)   10/11/22 50.9 kg (112 lb 5 oz)   05/12/22 48.2 kg (106 lb 5 oz)   02/15/22 51.7 kg (114 lb)   11/16/21 47.6 kg (105 lb)   07/29/21 47.8 kg (105 lb 5 oz)      Weight Change %:  Weight History / % Weight Change: per chart review, pt with 15 lb desirable wt gain in 9 months  Significant Weight Loss: " No    Nutrition Focused Physical Exam Findings:    Subcutaneous Fat Loss:   Orbital Fat Pads: Well nourished (slightly bulging fat pads)  Buccal Fat Pads: Well nourished (full, rounded cheeks)  Triceps: Well nourished (ample fat tissue)  Muscle Wasting:  Temporalis: Well nourished (well-defined muscle)  Pectoralis (Clavicular Region): Well nourished (clavicle not visible)  Deltoid/Trapezius: Well nourished (rounded appearance at arm, shoulder, neck)  Interosseous: Well nourished (muscle bulges)  Edema:  Edema: none  Physical Findings:  Skin: Negative    Nutrition Significant Labs:  BMP Trend:   Results from last 7 days   Lab Units 12/19/24  1217 12/19/24  0627 12/18/24  2355 12/18/24  2125   GLUCOSE mg/dL 152* 320* 190* 145*   CALCIUM mg/dL 9.1 8.3* 7.5* 8.6   SODIUM mmol/L 131* 128* 132* 137   POTASSIUM mmol/L 4.0 4.1 4.4 4.3   CO2 mmol/L 18* 13* 5* 5*   CHLORIDE mmol/L 104 101 100 104   BUN mg/dL 14 16 14 15   CREATININE mg/dL 1.19* 1.26* 1.07* 1.43*    , A1C:  Lab Results   Component Value Date    HGBA1C 4.5 12/18/2024   , BG POCT trend:   Results from last 7 days   Lab Units 12/19/24  1206 12/19/24  0806   POCT GLUCOSE mg/dL 134* 292*        Nutrition Specific Medications:  Scheduled medications  cyanocobalamin, 1,000 mcg, oral, Daily  folic acid, 1 mg, oral, Daily  insulin lispro, 0-10 Units, subcutaneous, TID AC  [Held by provider] magnesium oxide, 400 mg, oral, BID  pantoprazole, 40 mg, oral, Daily  piperacillin-tazobactam, 3.375 g, intravenous, q8h  sodium phosphate, 21 mmol, intravenous, Once  thiamine, 100 mg, oral, Daily    Continuous medications  D5 % and 0.9 % sodium chloride, 100 mL/hr, Last Rate: 100 mL/hr (12/19/24 0830)    I/O:    ;      Dietary Orders (From admission, onward)       Start     Ordered    12/19/24 0831  Adult diet Clear Liquid  Diet effective now        Question:  Diet type  Answer:  Clear Liquid    12/19/24 0830    12/19/24 0121  May Participate in Room Service  ( ROOM SERVICE MAY  PARTICIPATE)  Once        Question:  .  Answer:  Yes    12/19/24 0120                     Estimated Needs:   Total Energy Estimated Needs (kCal): 1770 kCal  Method for Estimating Needs: 30 kcal/kg ABW  Total Protein Estimated Needs (g): 71 g  Method for Estimating Needs: 1.2 g/kg ABW  Total Fluid Estimated Needs (mL): 1770 mL  Method for Estimating Needs: 1 ml/kcal or per MD        Nutrition Diagnosis   Malnutrition Diagnosis  Patient has Malnutrition Diagnosis: No    Nutrition Diagnosis  Patient has Nutrition Diagnosis: Yes  Diagnosis Status (1): New  Nutrition Diagnosis 1: Inadequate oral intake  Related to (1): nausea, vomiting  As Evidenced by (1): pt report of poor po intake x 4 days prior to admission       Nutrition Interventions/Recommendations         Nutrition Prescription:  Individualized Nutrition Prescription Provided for : Advance to Regular diet as medically appropriate. Ensure Clear and Gelatine while on clear liquid diet        Nutrition Interventions:   Interventions: Meals and snacks, Medical food supplement  Meals and Snacks: General healthful diet  Goal: Consumes 3 meals per day  Medical Food Supplement: Commercial beverage  Goal: Ensure Clear TID (provides 240 kcal, 8 g protein per serving). Gelatein Plus TID (160 kcal and 20 g protein per serving)    Collaboration and Referral of Nutrition Care: Collaboration by nutrition professional with other providers  Goal: IDT roseline RN, secure chat NP    Nutrition Education:   No needs at this time       Nutrition Monitoring and Evaluation   Food/Nutrient Related History Monitoring  Monitoring and Evaluation Plan: Energy intake, Amount of food, Fluid intake  Energy Intake: Estimated energy intake  Criteria: Pt meets >75% of estimated energy needs  Fluid Intake: Estimated fluid intake  Criteria: Meets >75% of estimated fluid needs  Amount of Food: Estimated amout of food, Medical food intake  Criteria: Pt consumes >75% of meals and supplements    Body  Composition/Growth/Weight History  Monitoring and Evaluation Plan: Weight  Weight: Measured weight  Criteria: Maintains stable weight    Biochemical Data, Medical Tests and Procedures  Monitoring and Evaluation Plan: Glucose/endocrine profile, Electrolyte/renal panel  Electrolyte and Renal Panel: Sodium, Potassium, Phosphorus  Criteria: Electrolytes WNL  Glucose/Endocrine Profile: Glucose, casual  Criteria: BG within desirable range              Time Spent (min): 45 minutes

## 2024-12-19 NOTE — ED PROVIDER NOTES
"HPI   Chief Complaint   Patient presents with    Chest Pain     Patient BIB EMS from home with complaints of \"ill feeling, weakness x 3-4 days\". Pt lives alone. A&Ox4.       Patient is a 59-year-old female with history of hypertension, neuropathy, hysterectomy, cholecystectomy, who presents for multiple medical complaints.  Patient states for the last several days has been having chest and abdominal pain.  Endorses cough, runny nose, sore throat, nausea, vomiting and some diarrhea.  Vomiting is nonbloody.  Diarrhea is without black or bloody stools.  Last bowel movement was today, small.  States she has been having decreased flatus.  Endorses cough, runny nose, sore throat, shortness of breath.  No fevers or chills.  Denies history of MI, CVA, DVT or PE.  Denies significant family history of medical problems.  No cardiac stents.  Endorses tobacco, no alcohol or drugs.  States she also has pain in her back. Endorses some urinary frequency.              Patient History   No past medical history on file.  Past Surgical History:   Procedure Laterality Date    OTHER SURGICAL HISTORY  10/27/2020    Hysterectomy    OTHER SURGICAL HISTORY  10/27/2020    Appendectomy    OTHER SURGICAL HISTORY  10/27/2020    Gallbladder surgery    OTHER SURGICAL HISTORY  10/27/2020    Glossectomy     Family History   Problem Relation Name Age of Onset    Coronary artery disease Mother      Other (ruptured cerebral aneurysm) Mother      Other (ruptured cerebral aneurysm) Father      Coronary artery disease Father      Alzheimer's disease Maternal Grandmother       Social History     Tobacco Use    Smoking status: Every Day     Current packs/day: 1.00     Types: Cigarettes    Smokeless tobacco: Not on file   Substance Use Topics    Alcohol use: Yes    Drug use: Defer       Physical Exam   ED Triage Vitals [12/18/24 2104]   Temperature Heart Rate Respirations BP   36.4 °C (97.5 °F) (!) 133 20 162/84      Pulse Ox Temp Source Heart Rate Source " Patient Position   (!) 88 % Temporal Monitor --      BP Location FiO2 (%)     -- --       Physical Exam  Constitutional:       General: She is not in acute distress.  HENT:      Head: Normocephalic.   Eyes:      Extraocular Movements: Extraocular movements intact.      Conjunctiva/sclera: Conjunctivae normal.      Pupils: Pupils are equal, round, and reactive to light.   Cardiovascular:      Rate and Rhythm: Regular rhythm. Tachycardia present.      Pulses: Normal pulses.           Radial pulses are 2+ on the right side and 2+ on the left side.        Dorsalis pedis pulses are 2+ on the right side and 2+ on the left side.   Pulmonary:      Effort: Pulmonary effort is normal.      Breath sounds: Normal breath sounds.   Abdominal:      General: There is no distension.      Palpations: Abdomen is soft. There is no mass.      Tenderness: There is no abdominal tenderness. There is no guarding.   Musculoskeletal:         General: No deformity.      Cervical back: Normal range of motion and neck supple.      Right lower leg: No edema.      Left lower leg: No edema.   Skin:     General: Skin is warm and dry.      Findings: No lesion or rash.   Neurological:      General: No focal deficit present.      Mental Status: She is alert and oriented to person, place, and time. Mental status is at baseline.      Cranial Nerves: No cranial nerve deficit.      Sensory: No sensory deficit.      Motor: No weakness.   Psychiatric:         Mood and Affect: Mood normal.           ED Course & MDM   Diagnoses as of 12/18/24 2351   Alcoholic ketoacidosis   Pneumonia of both lungs due to infectious organism, unspecified part of lung                 No data recorded                                 Medical Decision Making  EKG my interpretation: Rate 140, rhythm regular, axis normal, , QRS 80, QTc 558, T waves, unremarkable, ST segments: No elevations or depressions, interpretation: Sinus tachycardia, no STEMI    Patient is a 59-year-old  female above-stated past medical history who presents for multiple medical complaints.  Patient's CT scan showed signs of bilateral pneumonia.  Sepsis concern was noted at 2220. Patient's EKG is nonischemic, troponins are negative x 2, I have low suspicion for ACS.  Patient's CMP shows a mild ADELE, IV fluids were given.  Patient's anion gap is 32, beta-hydroxy is 12.91, bicarb is 5, her venous full panel shows a metabolic acidosis, given her drinking history and lack of diabetes history, I suspect she is suffering from alcoholic ketoacidosis.  Patient was given a amp of bicarb and started on a D5 LR drip.  She was given antibiotics for her pneumonia noted on her CT scan.  No dissection on her CT scan, cardiac tamponade, Boerhaave's, and I have low clinical suspicion for pulmonary embolism.  On further discussion, patient states her last drink was on Sunday, approximately 3 days ago.  Lipase is 121, not consistent with pancreatitis.  CBC shows no leukocytosis or anemia.  Given patient's tachycardia and her discomfort on arrival and her tachypnea, I did broaden her antibiotics when covering her pneumonia.  Lactate is 2.3, no occasion for 30 cc/kg.  I discussed patient's case with the ICU who accept the patient for admission.    Disclaimer: This note was dictated using speech recognition software. Minor errors in transcription may be present. Please call if questions.     Denny Youngblood MD  [facility]  Contact on [employer]        Problems Addressed:  Alcoholic ketoacidosis: acute illness or injury  Pneumonia of both lungs due to infectious organism, unspecified part of lung: acute illness or injury    Amount and/or Complexity of Data Reviewed  Labs: ordered.  Radiology: ordered.  ECG/medicine tests: ordered and independent interpretation performed.        Procedure  Critical Care    Performed by: Denny Youngblood MD  Authorized by: Denny Youngblood MD    Critical care provider statement:     Critical  care time (minutes):  35    Critical care time was exclusive of:  Separately billable procedures and treating other patients    Critical care was necessary to treat or prevent imminent or life-threatening deterioration of the following conditions: alcoholic ketoacidosis.    Critical care was time spent personally by me on the following activities:  Development of treatment plan with patient or surrogate, evaluation of patient's response to treatment, examination of patient, obtaining history from patient or surrogate, ordering and performing treatments and interventions, ordering and review of laboratory studies, ordering and review of radiographic studies, pulse oximetry, re-evaluation of patient's condition and review of old charts    Care discussed with: admitting provider         Denny Youngblood MD  12/19/24 0005

## 2024-12-19 NOTE — H&P
"[facility] Critical Care Medicine History & Physical      Date:  12/18/2024  Patient:  Jolanta Castaneda  YOB: 1965  MRN:  [id number]   Admit Date:  12/18/2024  ========================================================================================================    Chief Complaint   Patient presents with    Chest Pain     Patient BIB EMS from home with complaints of \"ill feeling, weakness x 3-4 days\". Pt lives alone. A&Ox4.     History of Present Illness:  Jolanta Castaneda is a 59 y.o. year old female patient with Past Medical History of gastric ulcer, hypertension, COPD, peripheral neuropathy, and malnutrition with past surgical history of cholecystectomy and whom is legally blind who presents with chest pain and vomiting. Pt states that about 3 days ago on Monday she had one glass of vodka which she rarely has and since then has had a lack of appetite. Her last meal was on Sunday and she tried having soda (root beer) but it did not agree with her and she has been having intermittent episodes of vomiting. She states that prior to this vodka consumption on Sunday she had not had anything to drink in at least 2 weeks if not longer and that she does not drink regularly but only on occasion and has never had withdrawal or hospitalization for alcohol use. She also denies other illicit drug use. She denies fevers, coughing, shortness of breath, dysuria, and currently does not have abdominal pain but she did have lower chest and upper abdominal pain prior to coming in. Pt denies headaches and neck pain. Pt also denies being on metformin, Jardiance, or other SGLTs, and insulin and denies all history of diabetes.    In the ER the patient was tachycardic, hypertensive, tachypneic and mildly hypoxemic requiring NC 2-3L. Pt was found to have a Cr of 1.43 (Baseline approx 0.5), bicarb of 5, AG of 32, Beta-hydroxy of 12, Glucose of 145, and pH of 6.9 with a lactic acid of 2.3 and lipase of 121, trop stable at 8-7 and " "EKG with sinus tachycardia. Pt was ordered 1L of LR, Cefepime, vancomycin, and azithromycin after CTA did not show dissection or PE but should some multifocal small areas of ground glass possibly due to PNA. Due to IV access issues patient had only received approximately 300cc of LR, morphine, bicarb, and thiamine, and is pending Abx administration at this time and rest of IV fluids. ICU was called for severe acidosis and ER had ordered phenobarbital for possible ETOH withdrawal.     Medical History:  No past medical history on file.  Past Surgical History:   Procedure Laterality Date    OTHER SURGICAL HISTORY  10/27/2020    Hysterectomy    OTHER SURGICAL HISTORY  10/27/2020    Appendectomy    OTHER SURGICAL HISTORY  10/27/2020    Gallbladder surgery    OTHER SURGICAL HISTORY  10/27/2020    Glossectomy     (Not in a hospital admission)    Patient has no known allergies.  Social History     Tobacco Use    Smoking status: Every Day     Current packs/day: 1.00     Types: Cigarettes   Substance Use Topics    Alcohol use: Yes    Drug use: Defer     Family History   Problem Relation Name Age of Onset    Coronary artery disease Mother      Other (ruptured cerebral aneurysm) Mother      Other (ruptured cerebral aneurysm) Father      Coronary artery disease Father      Alzheimer's disease Maternal Grandmother         Review of Systems:  14 point review of systems was completed and negative except for those specially mention in my HPI    Physical Exam:    Heart Rate:  [130-144]   Temperature:  [36.4 °C (97.5 °F)-37 °C (98.6 °F)]   Respirations:  [20-30]   BP: (157-175)/(84-90)   Height:  [175.3 cm (5' 9\")]   Weight:  [68 kg (150 lb)]   Pulse Ox:  [88 %-100 %]     Physical Exam  Constitutional:       General: She is in acute distress.      Appearance: Normal appearance. She is ill-appearing. She is not toxic-appearing.   HENT:      Mouth/Throat:      Mouth: Mucous membranes are dry.   Eyes:      General: No scleral icterus.    "  Pupils: Pupils are equal, round, and reactive to light.   Cardiovascular:      Rate and Rhythm: Regular rhythm. Tachycardia present.      Pulses: Normal pulses.      Heart sounds: No murmur heard.  Pulmonary:      Effort: Pulmonary effort is normal.      Comments: Tachypneic with very deep and rapid breathing  Nearly kussmaul respirations  Otherwise lung sounds normal and clear without notable rales or wheezes or rhonchi  Patient talking in complete sentences and answering all my questions  Abdominal:      General: Abdomen is flat.      Palpations: Abdomen is soft.      Tenderness: There is no abdominal tenderness.   Musculoskeletal:      Right lower leg: No edema.      Left lower leg: No edema.   Skin:     General: Skin is warm and dry.      Capillary Refill: Capillary refill takes less than 2 seconds.      Findings: No rash.   Neurological:      General: No focal deficit present.      Mental Status: She is alert and oriented to person, place, and time.      Cranial Nerves: No cranial nerve deficit.      Motor: No weakness.      Comments: Patient alert and oriented x 4 (Name, place, date, president, and events)  Moving all extremities normally without without deficits    Psychiatric:         Mood and Affect: Mood normal.         Behavior: Behavior normal.         Objective:    I have reviewed all medications, laboratory results, and imaging pertinent for today's encounter    Assessment/Plan:  Pt is a 60 y/o F w/ a PMHx of gastric ulcer, hypertension, COPD, peripheral neuropathy, and malnutrition with past surgical history of cholecystectomy and whom is legally blind who p/w chest pain and vomiting without evidence of PE or dissection and likely from vomiting due to severe metabolic acidosis from starvation ketosis due to alcohol gastritis and with low suspicion for withdrawal at this time but complicated by ADELE and possible pneumonia vs pneumonitis at this time. Pt requires ICU care due to severe acidosis.      Neuro/Psych/Pain Ctrl/Sedation:  #Alcohol use - low suspicion for Alcohol withdrawal at this time  - Pain control as needed with PRN Tylenol and PRN opioids as needed  - CAM-ICU & Delirium Precautions  - Will add PRN phenobarb and CIWA scoring as needed although vital sign abnormalities likely due to severe dehydration and will continue to resuscitate   - PT/OT evaluation once medically more stable   - C/W Patient's bupropion XL, folic acid, multivitamin, mirtazapine, and thiamine supplements  - C/W Home gabapentin    Respiratory/ENT:  #Tachypnea  #Mild Hypoxemia Resp failure  #Possible PNA vs aspiration pneumonitis  #History of COPD currently without exacerbation  -Supplemental Oxygen as needed to maintain an SpO2>90%  -Incentive spirometry and Early mobilization for lung volume expansion  - Will add Duoneb Q6H for over night and then likely transition to Q6H PRN   - No need for steroids at this time  - Abx as below in ID section    Cardiovascular:  #HTN  #Hypovolemia  - Will continue with IV fluid resuscitation  - C/W Tele monitoring  - Will restart her home BB as some of the tachycardia could be related to BB withdrawal and rebound but most likely a lot related to severe dehydration  - Monitor for shock and need for pressors to maintain a MAP of 65mmHg for renal perfusion    Renal/Volume Status (Intra & Extravascular):  #ADELE - due to pre-renal etiology of hypovolemia  - Will giver addition 1L of LR and 1L of NS  - C/W D5LR for now  - Monitor BMPs/Mg/Phos Q6H for now  - Repeat lactic acid in AM  - Monitor Cr Q6H with BMPs  - Monitor UOP Q4H  - Avoid nephrotoxic drugs and renally dose all medications    GI:  #Vomiting  #History of Gastric Ulcer  - C/W Home PPI  - Sips and chips for now    Infectious Disease:  #Possible PNA vs aspiration pneumonitis  - Will Check procal and if <0.25 can discontinue Abx  - Will check MRSA PCR and if negative will discontinue Vancomycin  - Will change Cefepime to Zosyn given ADELE  and patient higher risk for cefepime induce neurotoxictity  - Will follow-up blood cultures and narrow Abx as needed  - Check urine leg and strep and if urine leg negative discontinue Azithromycin    Heme/Onc:  - No current active issues  - Monitor Hgb Daily and transfuse for Hgb<7 or bleeding with hemodynamic instability  - Pt liekly very dry as baseline Hgb around 7-9 and currently 12 and likely hemo concentrated  - HSQ for VTE PPx    Endocrine  #Starvation ketosis  #Low TSH  - C/W D5 LR for now and can switch to D5 1/2NS vs D5W pending sodium trend  - Check Finger sticks Q4H for now  - Add ISS as needed  - Finger sticks goals 100-180  - Will add T4  - High risk for re-feeding syndrome and will monitor BMPs with mag and phos Q6H given her malnutrition    Ethics/Code Status:  - Full code    :  DVT Prophylaxis: HSQ  GI Prophylaxis: Yes - PPI  Bowel Regimen: Will add once diet resumed but none for now  Diet: NPO except sips and chips  CVC: None  [place]: None  [place]andujano: External catheter  Restraints: None  Dispo: ICU    Critical Care Time:  35 minutes  Critical Care Activities: Fluid resuscitation and management of severe acidosis and time spent with patient explaining current diagnosis and treatment plan and answering any questions.    Gutierrez Fontenot MD

## 2024-12-19 NOTE — CONSULTS
Jolanta Castaneda, Age 59 is being initiated on pharmacy to dose vancomycin for Pneumonia in the ED.  Patient is only ordered 1 dose of vancomycin therapy, as ED orders are not valid to continue when pt is transferred to a floor. Renal function is currently 44.3.    Patient will be given an initial dose of 1750mg    Attending or ID Services must reorder if Vancomycin is to be continued.    Please contact the pharmacy at [phone] with any questions.    Reviewed and approved by LIZABETH ISSA on 12/18/24 at 10:30 PM.

## 2024-12-19 NOTE — PROGRESS NOTES
Vancomycin Dosing by Pharmacy- Cessation of Therapy    Consult to pharmacy for vancomycin dosing has been discontinued by the prescriber, pharmacy will sign off at this time.    Please call pharmacy if there are further questions or re-enter a consult if vancomycin is resumed.     Selina Gallegos, PharmD

## 2024-12-20 LAB
ANION GAP SERPL CALC-SCNC: 10 MMOL/L (ref 10–20)
ANION GAP SERPL CALC-SCNC: 11 MMOL/L (ref 10–20)
ANION GAP SERPL CALC-SCNC: 12 MMOL/L (ref 10–20)
BASOPHILS # BLD AUTO: 0.01 X10*3/UL (ref 0–0.1)
BASOPHILS NFR BLD AUTO: 0.2 %
BUN SERPL-MCNC: 10 MG/DL (ref 6–23)
BUN SERPL-MCNC: 7 MG/DL (ref 6–23)
BUN SERPL-MCNC: 9 MG/DL (ref 6–23)
CALCIUM SERPL-MCNC: 8.3 MG/DL (ref 8.6–10.3)
CALCIUM SERPL-MCNC: 8.4 MG/DL (ref 8.6–10.3)
CALCIUM SERPL-MCNC: 8.8 MG/DL (ref 8.6–10.3)
CHLORIDE SERPL-SCNC: 108 MMOL/L (ref 98–107)
CHLORIDE SERPL-SCNC: 111 MMOL/L (ref 98–107)
CHLORIDE SERPL-SCNC: 112 MMOL/L (ref 98–107)
CO2 SERPL-SCNC: 18 MMOL/L (ref 21–32)
CO2 SERPL-SCNC: 19 MMOL/L (ref 21–32)
CO2 SERPL-SCNC: 19 MMOL/L (ref 21–32)
CREAT SERPL-MCNC: 0.76 MG/DL (ref 0.5–1.05)
CREAT SERPL-MCNC: 0.86 MG/DL (ref 0.5–1.05)
CREAT SERPL-MCNC: 0.95 MG/DL (ref 0.5–1.05)
EGFRCR SERPLBLD CKD-EPI 2021: 69 ML/MIN/1.73M*2
EGFRCR SERPLBLD CKD-EPI 2021: 78 ML/MIN/1.73M*2
EGFRCR SERPLBLD CKD-EPI 2021: 90 ML/MIN/1.73M*2
EOSINOPHIL # BLD AUTO: 0.1 X10*3/UL (ref 0–0.7)
EOSINOPHIL NFR BLD AUTO: 1.8 %
ERYTHROCYTE [DISTWIDTH] IN BLOOD BY AUTOMATED COUNT: 15.9 % (ref 11.5–14.5)
GLUCOSE BLD MANUAL STRIP-MCNC: 111 MG/DL (ref 74–99)
GLUCOSE BLD MANUAL STRIP-MCNC: 112 MG/DL (ref 74–99)
GLUCOSE BLD MANUAL STRIP-MCNC: 134 MG/DL (ref 74–99)
GLUCOSE BLD MANUAL STRIP-MCNC: 91 MG/DL (ref 74–99)
GLUCOSE SERPL-MCNC: 155 MG/DL (ref 74–99)
GLUCOSE SERPL-MCNC: 164 MG/DL (ref 74–99)
GLUCOSE SERPL-MCNC: 164 MG/DL (ref 74–99)
HCT VFR BLD AUTO: 29.2 % (ref 36–46)
HGB BLD-MCNC: 9.6 G/DL (ref 12–16)
HOLD SPECIMEN: NORMAL
IMM GRANULOCYTES # BLD AUTO: 0.04 X10*3/UL (ref 0–0.7)
IMM GRANULOCYTES NFR BLD AUTO: 0.7 % (ref 0–0.9)
LEGIONELLA AG UR QL: NEGATIVE
LYMPHOCYTES # BLD AUTO: 1.21 X10*3/UL (ref 1.2–4.8)
LYMPHOCYTES NFR BLD AUTO: 21.5 %
MAGNESIUM SERPL-MCNC: 1.83 MG/DL (ref 1.6–2.4)
MAGNESIUM SERPL-MCNC: 1.93 MG/DL (ref 1.6–2.4)
MAGNESIUM SERPL-MCNC: 2.04 MG/DL (ref 1.6–2.4)
MCH RBC QN AUTO: 29.6 PG (ref 26–34)
MCHC RBC AUTO-ENTMCNC: 32.9 G/DL (ref 32–36)
MCV RBC AUTO: 90 FL (ref 80–100)
MONOCYTES # BLD AUTO: 0.26 X10*3/UL (ref 0.1–1)
MONOCYTES NFR BLD AUTO: 4.6 %
NEUTROPHILS # BLD AUTO: 4.02 X10*3/UL (ref 1.2–7.7)
NEUTROPHILS NFR BLD AUTO: 71.2 %
NRBC BLD-RTO: 0 /100 WBCS (ref 0–0)
PHOSPHATE SERPL-MCNC: 2.2 MG/DL (ref 2.5–4.9)
PHOSPHATE SERPL-MCNC: 2.6 MG/DL (ref 2.5–4.9)
PHOSPHATE SERPL-MCNC: 3.8 MG/DL (ref 2.5–4.9)
PLATELET # BLD AUTO: 140 X10*3/UL (ref 150–450)
POTASSIUM SERPL-SCNC: 2.9 MMOL/L (ref 3.5–5.3)
POTASSIUM SERPL-SCNC: 3.1 MMOL/L (ref 3.5–5.3)
POTASSIUM SERPL-SCNC: 3.9 MMOL/L (ref 3.5–5.3)
RBC # BLD AUTO: 3.24 X10*6/UL (ref 4–5.2)
S PNEUM AG UR QL: NEGATIVE
SODIUM SERPL-SCNC: 136 MMOL/L (ref 136–145)
SODIUM SERPL-SCNC: 136 MMOL/L (ref 136–145)
SODIUM SERPL-SCNC: 138 MMOL/L (ref 136–145)
WBC # BLD AUTO: 5.6 X10*3/UL (ref 4.4–11.3)

## 2024-12-20 PROCEDURE — 80048 BASIC METABOLIC PNL TOTAL CA: CPT | Performed by: STUDENT IN AN ORGANIZED HEALTH CARE EDUCATION/TRAINING PROGRAM

## 2024-12-20 PROCEDURE — 86431 RHEUMATOID FACTOR QUANT: CPT | Mod: ELYLAB | Performed by: STUDENT IN AN ORGANIZED HEALTH CARE EDUCATION/TRAINING PROGRAM

## 2024-12-20 PROCEDURE — 2500000005 HC RX 250 GENERAL PHARMACY W/O HCPCS: Performed by: STUDENT IN AN ORGANIZED HEALTH CARE EDUCATION/TRAINING PROGRAM

## 2024-12-20 PROCEDURE — 94640 AIRWAY INHALATION TREATMENT: CPT

## 2024-12-20 PROCEDURE — 1210000001 HC SEMI-PRIVATE ROOM DAILY

## 2024-12-20 PROCEDURE — 83735 ASSAY OF MAGNESIUM: CPT | Performed by: STUDENT IN AN ORGANIZED HEALTH CARE EDUCATION/TRAINING PROGRAM

## 2024-12-20 PROCEDURE — 2500000002 HC RX 250 W HCPCS SELF ADMINISTERED DRUGS (ALT 637 FOR MEDICARE OP, ALT 636 FOR OP/ED): Performed by: STUDENT IN AN ORGANIZED HEALTH CARE EDUCATION/TRAINING PROGRAM

## 2024-12-20 PROCEDURE — 36415 COLL VENOUS BLD VENIPUNCTURE: CPT | Performed by: STUDENT IN AN ORGANIZED HEALTH CARE EDUCATION/TRAINING PROGRAM

## 2024-12-20 PROCEDURE — 2500000001 HC RX 250 WO HCPCS SELF ADMINISTERED DRUGS (ALT 637 FOR MEDICARE OP): Performed by: STUDENT IN AN ORGANIZED HEALTH CARE EDUCATION/TRAINING PROGRAM

## 2024-12-20 PROCEDURE — 82947 ASSAY GLUCOSE BLOOD QUANT: CPT

## 2024-12-20 PROCEDURE — 85025 COMPLETE CBC W/AUTO DIFF WBC: CPT | Performed by: STUDENT IN AN ORGANIZED HEALTH CARE EDUCATION/TRAINING PROGRAM

## 2024-12-20 PROCEDURE — 2500000001 HC RX 250 WO HCPCS SELF ADMINISTERED DRUGS (ALT 637 FOR MEDICARE OP)

## 2024-12-20 PROCEDURE — 86235 NUCLEAR ANTIGEN ANTIBODY: CPT | Mod: ELYLAB | Performed by: STUDENT IN AN ORGANIZED HEALTH CARE EDUCATION/TRAINING PROGRAM

## 2024-12-20 PROCEDURE — 86038 ANTINUCLEAR ANTIBODIES: CPT | Mod: ELYLAB | Performed by: STUDENT IN AN ORGANIZED HEALTH CARE EDUCATION/TRAINING PROGRAM

## 2024-12-20 PROCEDURE — 2500000004 HC RX 250 GENERAL PHARMACY W/ HCPCS (ALT 636 FOR OP/ED): Performed by: STUDENT IN AN ORGANIZED HEALTH CARE EDUCATION/TRAINING PROGRAM

## 2024-12-20 PROCEDURE — 86036 ANCA SCREEN EACH ANTIBODY: CPT | Performed by: STUDENT IN AN ORGANIZED HEALTH CARE EDUCATION/TRAINING PROGRAM

## 2024-12-20 PROCEDURE — 97161 PT EVAL LOW COMPLEX 20 MIN: CPT | Mod: GP

## 2024-12-20 PROCEDURE — 84100 ASSAY OF PHOSPHORUS: CPT | Performed by: STUDENT IN AN ORGANIZED HEALTH CARE EDUCATION/TRAINING PROGRAM

## 2024-12-20 PROCEDURE — 2500000004 HC RX 250 GENERAL PHARMACY W/ HCPCS (ALT 636 FOR OP/ED)

## 2024-12-20 PROCEDURE — 97165 OT EVAL LOW COMPLEX 30 MIN: CPT | Mod: GO

## 2024-12-20 PROCEDURE — 99233 SBSQ HOSP IP/OBS HIGH 50: CPT

## 2024-12-20 RX ORDER — IPRATROPIUM BROMIDE AND ALBUTEROL SULFATE 2.5; .5 MG/3ML; MG/3ML
3 SOLUTION RESPIRATORY (INHALATION) EVERY 6 HOURS PRN
Status: DISCONTINUED | OUTPATIENT
Start: 2024-12-20 | End: 2024-12-22 | Stop reason: HOSPADM

## 2024-12-20 RX ORDER — POTASSIUM CHLORIDE 20 MEQ/1
40 TABLET, EXTENDED RELEASE ORAL ONCE
Status: COMPLETED | OUTPATIENT
Start: 2024-12-20 | End: 2024-12-20

## 2024-12-20 RX ORDER — AMOXICILLIN AND CLAVULANATE POTASSIUM 875; 125 MG/1; MG/1
1 TABLET, FILM COATED ORAL EVERY 12 HOURS SCHEDULED
Status: DISCONTINUED | OUTPATIENT
Start: 2024-12-20 | End: 2024-12-22 | Stop reason: HOSPADM

## 2024-12-20 RX ORDER — POTASSIUM CHLORIDE 14.9 MG/ML
20 INJECTION INTRAVENOUS
Status: COMPLETED | OUTPATIENT
Start: 2024-12-20 | End: 2024-12-20

## 2024-12-20 RX ADMIN — GABAPENTIN 300 MG: 300 CAPSULE ORAL at 20:44

## 2024-12-20 RX ADMIN — AMOXICILLIN AND CLAVULANATE POTASSIUM 1 TABLET: 875; 125 TABLET, FILM COATED ORAL at 13:05

## 2024-12-20 RX ADMIN — AMOXICILLIN AND CLAVULANATE POTASSIUM 1 TABLET: 875; 125 TABLET, FILM COATED ORAL at 20:44

## 2024-12-20 RX ADMIN — POTASSIUM CHLORIDE 20 MEQ: 14.9 INJECTION, SOLUTION INTRAVENOUS at 08:55

## 2024-12-20 RX ADMIN — BUPROPION HYDROCHLORIDE 150 MG: 150 TABLET, EXTENDED RELEASE ORAL at 09:03

## 2024-12-20 RX ADMIN — PANTOPRAZOLE SODIUM 40 MG: 40 TABLET, DELAYED RELEASE ORAL at 06:36

## 2024-12-20 RX ADMIN — POTASSIUM CHLORIDE 40 MEQ: 1500 TABLET, EXTENDED RELEASE ORAL at 06:36

## 2024-12-20 RX ADMIN — IPRATROPIUM BROMIDE AND ALBUTEROL SULFATE 3 ML: 2.5; .5 SOLUTION RESPIRATORY (INHALATION) at 00:08

## 2024-12-20 RX ADMIN — Medication 1000 MCG: at 09:03

## 2024-12-20 RX ADMIN — POTASSIUM CHLORIDE 20 MEQ: 14.9 INJECTION, SOLUTION INTRAVENOUS at 06:36

## 2024-12-20 RX ADMIN — MIRTAZAPINE 15 MG: 15 TABLET, FILM COATED ORAL at 20:44

## 2024-12-20 RX ADMIN — METOPROLOL TARTRATE 50 MG: 50 TABLET, FILM COATED ORAL at 20:44

## 2024-12-20 RX ADMIN — METOPROLOL TARTRATE 50 MG: 50 TABLET, FILM COATED ORAL at 09:03

## 2024-12-20 RX ADMIN — LIDOCAINE 4% 1 PATCH: 40 PATCH TOPICAL at 13:02

## 2024-12-20 RX ADMIN — PIPERACILLIN SODIUM AND TAZOBACTAM SODIUM 3.38 G: 3; .375 INJECTION, SOLUTION INTRAVENOUS at 02:06

## 2024-12-20 RX ADMIN — Medication 100 MG: at 09:02

## 2024-12-20 RX ADMIN — FOLIC ACID 1 MG: 1 TABLET ORAL at 09:02

## 2024-12-20 RX ADMIN — MONTELUKAST SODIUM 10 MG: 10 TABLET, FILM COATED ORAL at 20:44

## 2024-12-20 SDOH — HEALTH STABILITY: PHYSICAL HEALTH: ON AVERAGE, HOW MANY DAYS PER WEEK DO YOU ENGAGE IN MODERATE TO STRENUOUS EXERCISE (LIKE A BRISK WALK)?: 0 DAYS

## 2024-12-20 SDOH — ECONOMIC STABILITY: FOOD INSECURITY: WITHIN THE PAST 12 MONTHS, THE FOOD YOU BOUGHT JUST DIDN'T LAST AND YOU DIDN'T HAVE MONEY TO GET MORE.: NEVER TRUE

## 2024-12-20 SDOH — ECONOMIC STABILITY: INCOME INSECURITY: IN THE PAST 12 MONTHS HAS THE ELECTRIC, GAS, OIL, OR WATER COMPANY THREATENED TO SHUT OFF SERVICES IN YOUR HOME?: NO

## 2024-12-20 SDOH — SOCIAL STABILITY: SOCIAL INSECURITY: WITHIN THE LAST YEAR, HAVE YOU BEEN AFRAID OF YOUR PARTNER OR EX-PARTNER?: NO

## 2024-12-20 SDOH — HEALTH STABILITY: MENTAL HEALTH
DO YOU FEEL STRESS - TENSE, RESTLESS, NERVOUS, OR ANXIOUS, OR UNABLE TO SLEEP AT NIGHT BECAUSE YOUR MIND IS TROUBLED ALL THE TIME - THESE DAYS?: VERY MUCH

## 2024-12-20 SDOH — HEALTH STABILITY: PHYSICAL HEALTH
HOW OFTEN DO YOU NEED TO HAVE SOMEONE HELP YOU WHEN YOU READ INSTRUCTIONS, PAMPHLETS, OR OTHER WRITTEN MATERIAL FROM YOUR DOCTOR OR PHARMACY?: NEVER

## 2024-12-20 SDOH — ECONOMIC STABILITY: HOUSING INSECURITY: IN THE LAST 12 MONTHS, WAS THERE A TIME WHEN YOU WERE NOT ABLE TO PAY THE MORTGAGE OR RENT ON TIME?: NO

## 2024-12-20 SDOH — ECONOMIC STABILITY: TRANSPORTATION INSECURITY: IN THE PAST 12 MONTHS, HAS LACK OF TRANSPORTATION KEPT YOU FROM MEDICAL APPOINTMENTS OR FROM GETTING MEDICATIONS?: NO

## 2024-12-20 SDOH — SOCIAL STABILITY: SOCIAL INSECURITY: WITHIN THE LAST YEAR, HAVE YOU BEEN HUMILIATED OR EMOTIONALLY ABUSED IN OTHER WAYS BY YOUR PARTNER OR EX-PARTNER?: NO

## 2024-12-20 SDOH — HEALTH STABILITY: PHYSICAL HEALTH: ON AVERAGE, HOW MANY MINUTES DO YOU ENGAGE IN EXERCISE AT THIS LEVEL?: 0 MIN

## 2024-12-20 SDOH — HEALTH STABILITY: MENTAL HEALTH: HOW OFTEN DO YOU HAVE SIX OR MORE DRINKS ON ONE OCCASION?: NEVER

## 2024-12-20 SDOH — ECONOMIC STABILITY: HOUSING INSECURITY: IN THE PAST 12 MONTHS, HOW MANY TIMES HAVE YOU MOVED WHERE YOU WERE LIVING?: 3

## 2024-12-20 SDOH — ECONOMIC STABILITY: HOUSING INSECURITY: AT ANY TIME IN THE PAST 12 MONTHS, WERE YOU HOMELESS OR LIVING IN A SHELTER (INCLUDING NOW)?: YES

## 2024-12-20 SDOH — HEALTH STABILITY: MENTAL HEALTH: HOW MANY DRINKS CONTAINING ALCOHOL DO YOU HAVE ON A TYPICAL DAY WHEN YOU ARE DRINKING?: PATIENT UNABLE TO ANSWER

## 2024-12-20 SDOH — ECONOMIC STABILITY: FOOD INSECURITY: WITHIN THE PAST 12 MONTHS, YOU WORRIED THAT YOUR FOOD WOULD RUN OUT BEFORE YOU GOT THE MONEY TO BUY MORE.: NEVER TRUE

## 2024-12-20 SDOH — ECONOMIC STABILITY: FOOD INSECURITY: HOW HARD IS IT FOR YOU TO PAY FOR THE VERY BASICS LIKE FOOD, HOUSING, MEDICAL CARE, AND HEATING?: NOT HARD AT ALL

## 2024-12-20 SDOH — HEALTH STABILITY: MENTAL HEALTH: HOW OFTEN DO YOU HAVE A DRINK CONTAINING ALCOHOL?: PATIENT UNABLE TO ANSWER

## 2024-12-20 ASSESSMENT — COGNITIVE AND FUNCTIONAL STATUS - GENERAL
MOBILITY SCORE: 11
TURNING FROM BACK TO SIDE WHILE IN FLAT BAD: A LITTLE
DRESSING REGULAR LOWER BODY CLOTHING: A LOT
DAILY ACTIVITIY SCORE: 16
DAILY ACTIVITIY SCORE: 23
HELP NEEDED FOR BATHING: A LOT
WALKING IN HOSPITAL ROOM: A LOT
CLIMB 3 TO 5 STEPS WITH RAILING: A LOT
TOILETING: A LOT
MOVING TO AND FROM BED TO CHAIR: A LOT
MOBILITY SCORE: 17
STANDING UP FROM CHAIR USING ARMS: A LITTLE
CLIMB 3 TO 5 STEPS WITH RAILING: TOTAL
MOVING TO AND FROM BED TO CHAIR: A LITTLE
TURNING FROM BACK TO SIDE WHILE IN FLAT BAD: A LOT
TOILETING: A LITTLE
DRESSING REGULAR UPPER BODY CLOTHING: A LITTLE
MOVING FROM LYING ON BACK TO SITTING ON SIDE OF FLAT BED WITH BEDRAILS: A LOT
STANDING UP FROM CHAIR USING ARMS: A LOT
PERSONAL GROOMING: A LITTLE
WALKING IN HOSPITAL ROOM: A LOT

## 2024-12-20 ASSESSMENT — LIFESTYLE VARIABLES
ORIENTATION AND CLOUDING OF SENSORIUM: ORIENTED AND CAN DO SERIAL ADDITIONS
AUDITORY DISTURBANCES: NOT PRESENT
AGITATION: NORMAL ACTIVITY
TREMOR: NO TREMOR
AUDIT-C TOTAL SCORE: -1
TACTILE DISTURBANCES: VERY MILD ITCHING, PINS AND NEEDLES, BURNING OR NUMBNESS
PAROXYSMAL SWEATS: NO SWEAT VISIBLE
SKIP TO QUESTIONS 9-10: 0
TOTAL SCORE: 4
VISUAL DISTURBANCES: NOT PRESENT
PULSE: 99
HEADACHE, FULLNESS IN HEAD: MILD
ANXIETY: MILDLY ANXIOUS
NAUSEA AND VOMITING: NO NAUSEA AND NO VOMITING

## 2024-12-20 ASSESSMENT — PAIN SCALES - GENERAL
PAINLEVEL_OUTOF10: 4
PAINLEVEL_OUTOF10: 0 - NO PAIN
PAINLEVEL_OUTOF10: 2
PAINLEVEL_OUTOF10: 5 - MODERATE PAIN
PAINLEVEL_OUTOF10: 4
PAINLEVEL_OUTOF10: 0 - NO PAIN

## 2024-12-20 ASSESSMENT — PAIN - FUNCTIONAL ASSESSMENT
PAIN_FUNCTIONAL_ASSESSMENT: 0-10

## 2024-12-20 ASSESSMENT — PAIN DESCRIPTION - ORIENTATION: ORIENTATION: RIGHT

## 2024-12-20 ASSESSMENT — ACTIVITIES OF DAILY LIVING (ADL)
LACK_OF_TRANSPORTATION: NO
BATHING_ASSISTANCE: MINIMAL
LACK_OF_TRANSPORTATION: NO

## 2024-12-20 ASSESSMENT — PAIN DESCRIPTION - LOCATION: LOCATION: SHOULDER

## 2024-12-20 NOTE — PROGRESS NOTES
"[facility] Critical Care Medicine       Date:  12/20/2024  Patient:  Jolanta Castaneda  YOB: 1965  MRN:  [id number]   Admit Date:  12/18/2024  ========================================================================================================    Chief Complaint   Patient presents with    Chest Pain     Patient BIB EMS from home with complaints of \"ill feeling, weakness x 3-4 days\". Pt lives alone. A&Ox4.         History of Present Illness:  Jolanta Castaneda is a 59 y.o. year old female patient with Past Medical History of gastric ulcer, hypertension, COPD, peripheral neuropathy, and malnutrition with past surgical history of cholecystectomy and whom is legally blind who presents with chest pain and vomiting. Pt states that about 3 days ago on Monday she had one glass of vodka which she rarely has and since then has had a lack of appetite. Her last meal was on Sunday and she tried having soda (root beer) but it did not agree with her and she has been having intermittent episodes of vomiting. She states that prior to this vodka consumption on Sunday she had not had anything to drink in at least 2 weeks if not longer and that she does not drink regularly but only on occasion and has never had withdrawal or hospitalization for alcohol use. She also denies other illicit drug use. She denies fevers, coughing, shortness of breath, dysuria, and currently does not have abdominal pain but she did have lower chest and upper abdominal pain prior to coming in. Pt denies headaches and neck pain. Pt also denies being on metformin, Jardiance, or other SGLTs, and insulin and denies all history of diabetes.     In the ER the patient was tachycardic, hypertensive, tachypneic and mildly hypoxemic requiring NC 2-3L. Pt was found to have a Cr of 1.43 (Baseline approx 0.5), bicarb of 5, AG of 32, Beta-hydroxy of 12, Glucose of 145, and pH of 6.9 with a lactic acid of 2.3 and lipase of 121, trop stable at 8-7 and EKG with sinus " tachycardia. Pt was ordered 1L of LR, Cefepime, vancomycin, and azithromycin after CTA did not show dissection or PE but should some multifocal small areas of ground glass possibly due to PNA. Due to IV access issues patient had only received approximately 300cc of LR, morphine, bicarb, and thiamine, and is pending Abx administration at this time and rest of IV fluids. ICU was called for severe acidosis and ER had ordered phenobarbital for possible ETOH withdrawal.        Interval ICU Events:  12/19: admitted to the ICU overnight with metabolic acidosis starvation ketosis. Lab work improved overnight. Glucose 320 this morning on D5LR, sodium 128 - will switch fluids to D5NS, hypophosphatemia, and hypomagnesemia - will replace.     12/20: no acute events overnight. Patient tolerating clear liquid diet. Will advance to regular diet. Replace electrolytes. Will plan to transfer to [facility] today.    Medical History:  No past medical history on file.  Past Surgical History:   Procedure Laterality Date    OTHER SURGICAL HISTORY  10/27/2020    Hysterectomy    OTHER SURGICAL HISTORY  10/27/2020    Appendectomy    OTHER SURGICAL HISTORY  10/27/2020    Gallbladder surgery    OTHER SURGICAL HISTORY  10/27/2020    Glossectomy     Medications Prior to Admission   Medication Sig Dispense Refill Last Dose/Taking    buPROPion XL (Wellbutrin XL) 150 mg 24 hr tablet Take 1 tablet (150 mg) by mouth once daily. Do not crush, chew, or split.   Unknown    cyanocobalamin (Vitamin B-12) 1,000 mcg tablet Take 1 tablet (1,000 mcg) by mouth once daily.   Unknown    diclofenac sodium (Voltaren) 1 % gel Apply 2.25 inches (2 g) topically 2 times a day as needed (To affected joints).   Unknown    folic acid (Folvite) 1 mg tablet Take 1 tablet (1 mg) by mouth once daily. Do not start before March 28, 2024.   Unknown    gabapentin (Neurontin) 300 mg capsule Take 1 capsule (300 mg) by mouth once daily at bedtime.   Unknown    lubricating eye drops  ophthalmic solution Administer 1 drop into both eyes 2 times a day as needed for dry eyes.   Unknown    magnesium oxide (Mag-Ox) 400 mg (241.3 mg magnesium) tablet Take 1 tablet (400 mg) by mouth 2 times a day.   Unknown    meloxicam (Mobic) 15 mg tablet Take 1 tablet (15 mg) by mouth once daily with breakfast. 30 tablet 2 Unknown    metoprolol tartrate (Lopressor) 50 mg tablet Take 1 tablet by mouth 2 times a day.   Unknown    mirtazapine (Remeron) 15 mg tablet Take 1 tablet (15 mg) by mouth once daily at bedtime.   Unknown    montelukast (Singulair) 10 mg tablet Take 1 tablet (10 mg) by mouth once daily at bedtime.   Unknown    multivitamin with minerals tablet Take 1 tablet by mouth once daily. Do not start before March 28, 2024.   Unknown    pantoprazole (ProtoNix) 40 mg EC tablet Take 1 tablet (40 mg) by mouth once daily in the morning. Take before meals. Do not crush, chew, or split.   Unknown    thiamine (Vitamin B-1) 100 mg tablet Take 1 tablet (100 mg) by mouth once daily. Do not start before March 28, 2024.   Unknown     Patient has no known allergies.  Social History     Tobacco Use    Smoking status: Every Day     Current packs/day: 0.50     Types: Cigarettes   Substance Use Topics    Alcohol use: Yes    Drug use: Defer     Family History   Problem Relation Name Age of Onset    Coronary artery disease Mother      Other (ruptured cerebral aneurysm) Mother      Other (ruptured cerebral aneurysm) Father      Coronary artery disease Father      Alzheimer's disease Maternal Grandmother         Review of Systems:  14 point review of systems was completed and negative except for those specially mention in my HPI    Physical Exam:    Heart Rate:  [70-96]   Temp:  [35.9 °C (96.6 °F)-36.1 °C (97 °F)]   Resp:  [11-58]   BP: ()/(44-79)   Weight:  [59 kg (130 lb 1.1 oz)]   SpO2:  [94 %-100 %]     Physical Exam  Constitutional:       Appearance: She is ill-appearing.   HENT:      Head: Normocephalic.       Mouth/Throat:      Mouth: Mucous membranes are dry.   Eyes:      Pupils: Pupils are equal, round, and reactive to light.   Cardiovascular:      Rate and Rhythm: Regular rhythm. Tachycardia present.      Pulses: Normal pulses.      Heart sounds: Normal heart sounds.   Pulmonary:      Effort: Pulmonary effort is normal.      Breath sounds: Normal breath sounds.   Abdominal:      General: Abdomen is flat. Bowel sounds are normal.      Palpations: Abdomen is soft.   Musculoskeletal:         General: Normal range of motion.   Skin:     General: Skin is warm and dry.      Capillary Refill: Capillary refill takes less than 2 seconds.   Neurological:      Mental Status: She is oriented to person, place, and time and easily aroused. She is lethargic.         Objective:    I have reviewed all medications, laboratory results, and imaging pertinent for today's encounter    Assessment/Plan:    I am currently managing this critically ill patient for the following problems:    Neuro/Psych/Pain Ctrl/Sedation:  #Alcohol use - low suspicion for Alcohol withdrawal at this time  - Pain control as needed with PRN Tylenol and PRN opioids as needed  - CAM-ICU & Delirium Precautions  - PT/OT evaluation   - C/W Patient's bupropion XL, folic acid, multivitamin, mirtazapine, and thiamine supplements  - C/W Home gabapentin     Respiratory/ENT:  #Tachypnea  #Mild Hypoxemia Resp failure  #Possible PNA vs aspiration pneumonitis  #History of COPD currently without exacerbation  -Supplemental Oxygen as needed to maintain an SpO2>90%  -Incentive spirometry and Early mobilization for lung volume expansion  - Duoneb PRN  - Abx as below in ID section     Cardiovascular:  #HTN  #Hypovolemia  - Will continue with IV fluid resuscitation  - C/W Tele monitoring  - Will restart her home BB as some of the tachycardia could be related to BB withdrawal and rebound but most likely a lot related to severe dehydration  - Echo ordered     Renal/Volume Status (Intra  & Extravascular):  #ADELE - resolved  - Monitor BMPs/Mg/Phos Q6H for now  - Monitor UOP Q4H  - Avoid nephrotoxic drugs and renally dose all medications     GI:  #Vomiting  #History of Gastric Ulcer  - C/W Home PPI  - Sips and chips for now     Infectious Disease:  #Possible PNA vs aspiration pneumonitis  - Procal elevated  - Will check MRSA PCR and if negative will discontinue Vancomycin  - Augmentin for 7 days     Heme/Onc:  - No current active issues  - Monitor Hgb Daily and transfuse for Hgb<7 or bleeding with hemodynamic instability  - HSQ for VTE PPx     Endocrine  #Starvation ketosis  #Low TSH  - Finger sticks goals 100-180  - Will add T4     Ethics/Code Status:  - Full code     :  DVT Prophylaxis: HSQ  GI Prophylaxis: Yes - PPI  Bowel Regimen: no  Diet: clear liquid diet  CVC: None  [place]: None  Mandujano: External catheter  Restraints: None  Dispo: transfer to [facility]    Critical Care Time:  35 minutes spent in preparing to see patient (I.e. review of medical records), evaluation of diagnostics (I.e. labs, imaging, etc.), documentation, discussing plan of care with patient/ family/ caregiver, and/ or coordination of care with multidisciplinary team. Time does not include completion of procedure time.      HOWIE Rodriguez-CNP

## 2024-12-20 NOTE — CARE PLAN
Problem: Pain - Adult  Goal: Verbalizes/displays adequate comfort level or baseline comfort level  Outcome: Progressing     Problem: Safety - Adult  Goal: Free from fall injury  Outcome: Progressing     Problem: Discharge Planning  Goal: Discharge to home or other facility with appropriate resources  Outcome: Progressing     Problem: Chronic Conditions and Co-morbidities  Goal: Patient's chronic conditions and co-morbidity symptoms are monitored and maintained or improved  Outcome: Progressing     Problem: Nutrition  Goal: Less than 5 days NPO/clear liquids  Outcome: Progressing  Goal: Oral intake greater 75%  Outcome: Progressing  Goal: Consume prescribed supplement  Outcome: Progressing  Goal: Adequate PO fluid intake  Outcome: Progressing  Goal: BG  mg/dL  Outcome: Progressing  Goal: Lab values WNL  Outcome: Progressing  Goal: Electrolytes WNL  Outcome: Progressing  Goal: Maintain stable weight  Outcome: Progressing     Problem: Skin  Goal: Decreased wound size/increased tissue granulation at next dressing change  Outcome: Progressing  Goal: Participates in plan/prevention/treatment measures  Outcome: Progressing  Goal: Prevent/manage excess moisture  Outcome: Progressing  Goal: Prevent/minimize sheer/friction injuries  Outcome: Progressing  Goal: Promote/optimize nutrition  Outcome: Progressing  Goal: Promote skin healing  Outcome: Progressing     Problem: Pain  Goal: Takes deep breaths with improved pain control throughout the shift  Outcome: Progressing  Goal: Turns in bed with improved pain control throughout the shift  Outcome: Progressing  Goal: Walks with improved pain control throughout the shift  Outcome: Progressing  Goal: Performs ADL's with improved pain control throughout shift  Outcome: Progressing  Goal: Participates in PT with improved pain control throughout the shift  Outcome: Progressing  Goal: Free from opioid side effects throughout the shift  Outcome: Progressing  Goal: Free from  acute confusion related to pain meds throughout the shift  Outcome: Progressing   The patient's goals for the shift include      The clinical goals for the shift include Patient will remain safe without injury throughout this shift.

## 2024-12-20 NOTE — PROGRESS NOTES
Occupational Therapy    Evaluation    Patient Name: Jolanta Castaneda  MRN: [id number]  Department: [facility]  Room: 12/12-A  Today's Date: 12/20/2024  Time Calculation  Start Time: 1203  Stop Time: 1235  Time Calculation (min): 32 min        Assessment:  OT Assessment: Impaired endurance, balance, safety, ADl independence and functional transfers.  Barriers to Discharge Home: Caregiver assistance  Caregiver Assistance: Patient lives alone and/or does not have reliable caregiver assistance  Evaluation/Treatment Tolerance: Patient limited by fatigue  End of Session Communication: Bedside nurse  End of Session Patient Position:  (on BSC with RN. Pt. continues to have incontitnence of bowel during transfers and ambulation.)  OT Assessment Results: Decreased ADL status, Decreased safe judgment during ADL, Decreased endurance, Decreased functional mobility, Decreased IADLs, Decreased trunk control for functional activities  Evaluation/Treatment Tolerance: Patient limited by fatigue  Plan:  Treatment Interventions: ADL retraining, Functional transfer training, Endurance training  OT Frequency: 2 times per week  OT Discharge Recommendations: Moderate intensity level of continued care  OT - OK to Discharge: Yes (when medically stable/cleared)    Subjective   Current Problem:  1. Alcoholic ketoacidosis  Transthoracic Echo (TTE) Complete    Transthoracic Echo (TTE) Complete      2. Pneumonia of both lungs due to infectious organism, unspecified part of lung  Transthoracic Echo (TTE) Complete    Transthoracic Echo (TTE) Complete      3. Moderate protein-calorie malnutrition (weight for age 60-74% of standard) (Multi)        4. Chest pain, unspecified  Transthoracic Echo (TTE) Complete        General:  General  Reason for Referral: ADL impairment  Referred By: Cathy OT/PT 12/19  Past Medical History Relevant to Rehab: HTN, COPD, neuropathy, legally blind, kassy, gastric ulcer  Family/Caregiver Present: No  Co-Treatment:  PT  Co-Treatment Reason: to maximize pt. safety  Prior to Session Communication: Bedside nurse  Patient Position Received: Bed, 3 rail up, Alarm off, not on at start of session  General Comment: pt. is 60 y/o female to ED with chest pain and vomiting. Per EMR, pt. had glass of vodka 3 days ago and has had lack of appetite since. Pt. was hypertensive and tachycardic in ED. CXR (-), CT angio chest: ground glass opacities in R middle lobe, hepatomegaly. Hgb 9.6, K 2.9, tox screen (+) barbiturate, opiates.  Precautions:  Medical Precautions: Fall precautions    Vitals: 100% 02, 80 HR, 119/73 BP. Pre vitals.     Pain:  Pain Assessment  Pain Assessment: 0-10  0-10 (Numeric) Pain Score: 4  Pain Type: Acute pain  Pain Location:  (headache and lower abdominal pain)    Objective   Cognition:  Overall Cognitive Status: Within Functional Limits     Home Living:  Home Living Comments: pt. lives alone in apartment. single floor, no steps. States laundry is on site, but it is not usable. Pt. states that she plans to use laundramat down the street. Has walk in shower and shower chair.  Prior Function:  Prior Function Comments: Independent for ADLs, has meals delivered, states that she does not clean and has not been doing laundry. one fall. Drives. uses cane most often, but owns WW and RW    ADL:  Eating Assistance: Independent  Grooming Assistance: Stand by  Bathing Assistance: Minimal  UE Dressing Assistance: Stand by  LE Dressing Assistance: Moderate  Toileting Assistance with Device: Moderate  Activity Tolerance:  Endurance: Decreased tolerance for upright activites  Activity Tolerance Comments: fatigue  Bed Mobility/Transfers: Bed Mobility  Bed Mobility: Yes  Bed Mobility 1  Bed Mobility 1: Supine to sitting  Level of Assistance 1: Minimum assistance  Bed Mobility Comments 1: assist to bring trunk to upright sit    Transfers  Transfer: Yes  Transfer 1  Technique 1: Sit to stand  Transfer Device 1: Walker  Transfer Level of  Assistance 1: Moderate assistance  Trials/Comments 1: assist to lift, steady, balance. VCs to push from bed/arm rest surface as pt. continues to attempt to pull up on walker. x1 LOB upon standing from commode. x4 trials througout evaluation.    Functional Mobility:  Functional Mobility  Functional Mobility Performed:  (~10 feet in room;limited by ICU lines. Pt. demonstrates Mod A with WW for safety, steadying, and balance. Pt requires cues for use of walker, assist with walker mgmt.)    Standing Balance:  Dynamic Standing Balance  Dynamic Standing-Comments: Poor +     Vision:Vision - Basic Assessment  Current Vision:  (Per EMR, pt. legally blind, however did not note deficits or barriers due to vision.)    Strength:  Strength Comments: BUEs 3+/5 MMT    Hand Function:  Gross Grasp: Functional  Extremities: RUE   RUE : Within Functional Limits and LUE   LUE: Within Functional Limits    Outcome Measures:[facility] Daily Activity  Putting on and taking off regular lower body clothing: A lot  Bathing (including washing, rinsing, drying): A lot  Putting on and taking off regular upper body clothing: A little  Toileting, which includes using toilet, bedpan or urinal: A lot  Taking care of personal grooming such as brushing teeth: A little  Eating Meals: None  Daily Activity - Total Score: 16    Education Documentation  ADL Training, taught by Teri Hudson OT at 12/20/2024  3:26 PM.  Learner: Patient  Readiness: Acceptance  Method: Explanation  Response: Verbalizes Understanding, Needs Reinforcement    IP EDUCATION:  Education  Individual(s) Educated: Patient  Education Provided: Risk and benefits of OT discussed with patient or other, Fall precautons, POC discussed and agreed upon    Goals:  Encounter Problems       Encounter Problems (Active)       OT Goals       Supervision for all functional transfers  (Progressing)       Start:  12/20/24    Expected End:  01/03/25            Supervision for LB dressing  (Progressing)       Start:  12/20/24    Expected End:  01/03/25            Supervision for toileting tasks and clothing mgmt  (Progressing)       Start:  12/20/24    Expected End:  01/03/25            Fair + dyn standing balance x8 minutes during ADLs and functional activities  (Progressing)       Start:  12/20/24    Expected End:  01/03/25

## 2024-12-20 NOTE — PROGRESS NOTES
Jolanta Castaneda was assessed by a Substance Use Navigator Specialist (SUNS) at 12:15 PM     Contact Number obtained during encounter:     Medical History: No past medical history on file.     Psychiatric History:  NA    Social History:   Social History     Socioeconomic History    Marital status: Single     Spouse name: Not on file    Number of children: Not on file    Years of education: Not on file    Highest education level: Not on file   Occupational History    Not on file   Tobacco Use    Smoking status: Every Day     Current packs/day: 0.50     Types: Cigarettes    Smokeless tobacco: Not on file   Substance and Sexual Activity    Alcohol use: Yes    Drug use: Defer    Sexual activity: Defer   Other Topics Concern    Not on file   Social History Narrative    Not on file     Social Drivers of Health     Financial Resource Strain: Low Risk  (12/20/2024)    Overall Financial Resource Strain (CARDIA)     Difficulty of Paying Living Expenses: Not hard at all   Food Insecurity: No Food Insecurity (12/20/2024)    Hunger Vital Sign     Worried About Running Out of Food in the Last Year: Never true     Ran Out of Food in the Last Year: Never true   Transportation Needs: No Transportation Needs (12/20/2024)    PRAPARE - Transportation     Lack of Transportation (Medical): No     Lack of Transportation (Non-Medical): No   Physical Activity: Inactive (12/20/2024)    Exercise Vital Sign     Days of Exercise per Week: 0 days     Minutes of Exercise per Session: 0 min   Stress: Stress Concern Present (12/20/2024)    [employer] [place] [employer] - Occupational Stress Questionnaire     Feeling of Stress : Very much   Social Connections: Not on file   Intimate Partner Violence: Not At Risk (12/20/2024)    Humiliation, Afraid, Rape, and Kick questionnaire     Fear of Current or Ex-Partner: No     Emotionally Abused: No     Physically Abused: No     Sexually Abused: No   Housing Stability: High Risk (12/20/2024)     Housing Stability Vital Sign     Unable to Pay for Housing in the Last Year: No     Number of Times Moved in the Last Year: 3     Homeless in the Last Year: Yes        UDS / Tox panel results:   none    Substance(s) used: ALCOHOL     Brief Summary of Assessment:   SUNS got a referral from Delilah PANCHAL about pt alcohol abuse. SUNS talked with pt. Pt said they lived in a apartment fro 19 years then got sick and got on SSD. Pt got sick and then went to [facility] and after that went to [facility] a women's long term shelter. Pt recently just moved into her new apartment. Pt is struggling / stressed about getting her new place set up / un packing. Pt said their friend has been lying on when they can help pt out. Pt shared they don't struggle with any mental health. Pt said they have family in [place] but they don't have a strong relationship with them anymore. Pt said they worked at [employer] for 10+ years until they got sick. Pt said they went out Monday for a drink with a friend. Pt said drinking isn't a issue for patient currently. PT had to evaluation pt so OLMAN struggled to get a answer about pt past / current alcohol use. SUNS did see some alcohol abuse in their chart in the past.    Diagnosis / Diagnostic Impression:   Alcohol abuse disorder (remission or denial)    Summary / Plan:  SUNS gave pt SUNS card / will outreach pt in a  couple weeks.,     Patient interested in treatment: No        Transportation Provided: NA

## 2024-12-20 NOTE — PROGRESS NOTES
"   12/20/24 1042   Discharge Planning   Living Arrangements Friends   Support Systems Friends/neighbors   Assistance Needed yes, Pt from home, states Independent with walker or cane, pt is legally blind, has license and car but has someone drive her car, pt had 1 fall in July, denies injury, pt gets meals on wheels   Type of Residence Private residence  (1 level apartment)   Number of Stairs to Enter Residence 0   Number of Stairs Within Residence 0   Do you have animals or pets at home? No   Home or Post Acute Services In home services;Post acute facilities (Rehab/SNF/etc)   Expected Discharge Disposition Home   Does the patient need discharge transport arranged? No   Financial Resource Strain   How hard is it for you to pay for the very basics like food, housing, medical care, and heating? Not hard   Housing Stability   In the last 12 months, was there a time when you were not able to pay the mortgage or rent on time? N   In the past 12 months, how many times have you moved where you were living? 0   At any time in the past 12 months, were you homeless or living in a shelter (including now)? N   Transportation Needs   In the past 12 months, has lack of transportation kept you from medical appointments or from getting medications? no   In the past 12 months, has lack of transportation kept you from meetings, work, or from getting things needed for daily living? No   Stroke Family Assessment   Stroke Family Assessment Needed No   Intensity of Service   Intensity of Service >30 min     Pt admitted to SICU with diagnosis Alcoholic ketoacidosis. Pt states lives alone in 1 level apartment, just moved into apartment on December 6th, states just bought all new furniture- prior to that pt states she was at [facility] for about a month then [facility] homeless shelter prior to moving in [facility]. Pt became very tearful during conversation, stating that she is so stressed, \"My friend Miguel Is supposed to be helping me with this " "move, I have things in storage that if not out by tomorrow 12/21 I have to pay more, Miguel didn't label boxes and now I have to go through everything, and it is just too much, I don't know what to do.\" Pt also goes on to say she is estranged from her son, she went to his home to ask for new phone number, wants to see her grand baby and her son refused to give his phone number to her. Pt ambulates with cane or walker, receives meals on wheels but states has had poor appetite recently, pt unable to say how much or how often she drinks alcohol- only was able to tell me when at [facility] and [employer] was not allowed to drink. Pt friend drives her car or [employer] insurance provides rides to PCP. Pt PCP is Dr. Lazo and Pharmacy is [facility] in [place]. Pt denies barriers to appts or medications. Pt states receives SS disability, able to pay rent and electric, water and gas are provided with apt. Pt states she had to quit working d/t neuropathy. Pt is unsure of DC preference, will await PT/OT evals to aide in DC planning. LENKA Mazariegos and OLMAN Goddard notified of this pt. CT team will continue monitoring case progression for DC planning.  "

## 2024-12-20 NOTE — PROGRESS NOTES
Physical Therapy    Physical Therapy Evaluation    Patient Name: Jolanta Castaneda  MRN: [id number]  Today's Date: 12/20/2024   Time Calculation  Start Time: 1204  Stop Time: 1235  Time Calculation (min): 31 min  12/12-A    Assessment/Plan   PT Assessment  PT Assessment Results: Decreased strength, Decreased endurance, Impaired balance, Decreased mobility, Decreased coordination, Pain  Rehab Prognosis: Fair  Evaluation/Treatment Tolerance: Patient limited by fatigue, Patient limited by pain  Barriers to Participation: Comorbidities  End of Session Communication: Bedside nurse  Assessment Comment: pt with decreased mobility /gait strength balance endurance pt to benefit from skilled PT to address deficits and improve functional mobility .  End of Session Patient Position:  (up on BSC with RN staff)  IP OR SWING BED PT PLAN  Inpatient or Swing Bed: Inpatient  PT Plan  Treatment/Interventions: Bed mobility, Transfer training, Gait training, Stair training, Balance training, Strengthening, Endurance training, Therapeutic exercise, Therapeutic activity  PT Plan: Ongoing PT  PT Frequency: 3 times per week  PT Discharge Recommendations: Moderate intensity level of continued care  PT Recommended Transfer Status: Assist x1, Assistive device  PT - OK to Discharge: Yes (once medically ready for discharge to next level of care.)    Subjective     Current Problem:  1. Alcoholic ketoacidosis  Transthoracic Echo (TTE) Complete    Transthoracic Echo (TTE) Complete      2. Pneumonia of both lungs due to infectious organism, unspecified part of lung  Transthoracic Echo (TTE) Complete    Transthoracic Echo (TTE) Complete      3. Moderate protein-calorie malnutrition (weight for age 60-74% of standard) (Multi)        4. Chest pain, unspecified  Transthoracic Echo (TTE) Complete        General Visit Information:  General  Reason for Referral: weakness/impaired mobility  Referred By: Cathy OT/PT 12/19  Past Medical History Relevant to  Rehab: HTN, COPD, neuropathy, legally blind, kassy, gastric ulcer  Family/Caregiver Present: No  Co-Treatment: OT  Co-Treatment Reason: to maximize pt. safety  Prior to Session Communication: Bedside nurse  Patient Position Received: Bed, 3 rail up, Alarm off, not on at start of session  General Comment: pt. is 60 y/o female to ED with chest pain and vomiting. Per EMR, pt. had glass of vodka 3 days ago and has had lack of appetite since. Pt. was hypertensive and tachycardic in ED. CXR (-), CT angio chest: ground glass opacities in R middle lobe, hepatomegaly. Hgb 9.6, K 2.9, tox screen (+) barbiturate, opiates.  Home Living:  Home Living  Home Living Comments: pt. lives alone in apartment. single floor, no steps. States laundry is on site, but it is not usable. Pt. states that she plans to use laundramat down the street. Has walk in shower and shower chair.  Prior Level of Function:  Prior Function Per Pt/Caregiver Report  Prior Function Comments: Independent for ADLs, has meals delivered, states that she does not clean and has not been doing laundry. one fall. Drives. uses cane most often, but owns WW and RW  Precautions:  Precautions  Medical Precautions: Fall precautions  Objective   Pain:  Pain Assessment  Pain Assessment: 0-10  0-10 (Numeric) Pain Score: 4  Pain Type: Acute pain  Pain Location:  (lower ABD and a headache)  Cognition:  Cognition  Overall Cognitive Status: Within Functional Limits  Orientation Level: Oriented X4  General Assessments:  Activity Tolerance  Endurance: Decreased tolerance for upright activites  Sensation  Sensation Comment: intact BLEs  Strength  Strength Comments: BLE 3+/5  Coordination  Movements are Fluid and Coordinated: Yes  Static Sitting Balance  Static Sitting-Comment/Number of Minutes: fair  Dynamic Sitting Balance  Dynamic Sitting-Comments: fair  Static Standing Balance  Static Standing-Comment/Number of Minutes: fair -  Dynamic Standing Balance  Dynamic Standing-Comments:  fair -  Functional Assessments:  Bed Mobility  Bed Mobility: Yes  Bed Mobility 1  Bed Mobility 1: Supine to sitting  Level of Assistance 1: Minimum assistance  Bed Mobility Comments 1: assistance to sit up . cues to scoot out  Transfers  Transfer: Yes  Transfer 1  Technique 1: Sit to stand  Transfer Device 1: Walker (FWW)  Transfer Level of Assistance 1: Moderate assistance  Trials/Comments 1: mod A with FWW cues to push up and reach back with transfers (pt sit to stands x 4    SPT to bedside commodex2  mod A.)  Ambulation/Gait Training  Ambulation/Gait Training Performed: Yes  Ambulation/Gait Training 1  Surface 1: Level tile  Device 1: Rolling walker  Assistance 1: Moderate assistance  Quality of Gait 1: Inconsistent stride length, Decreased step length, Forward flexed posture  Comments/Distance (ft) 1: unsteady gait . flexed posture short stride. 10ft with mod A. and fatigue. pt mobilty limited but frequent need to use BSC / diarrhea  Extremity/Trunk Assessments:  RLE   RLE : Within Functional Limits  LLE   LLE : Within Functional Limits  Outcome Measures:  [facility] Basic Mobility  Turning from your back to your side while in a flat bed without using bedrails: A lot  Moving from lying on your back to sitting on the side of a flat bed without using bedrails: A lot  Moving to and from bed to chair (including a wheelchair): A lot  Standing up from a chair using your arms (e.g. wheelchair or bedside chair): A lot  To walk in hospital room: A lot  Climbing 3-5 steps with railing: Total  Basic Mobility - Total Score: 11  Goals:  Encounter Problems       Encounter Problems (Active)       PT Problem       Pt will demonstrate SUP with bed mobility to edge of bed.         Start:  12/20/24    Expected End:  01/03/25            Pt will demonstrate SUP with sit to stand/chair transfers with FWW.         Start:  12/20/24    Expected End:  01/03/25            Pt will ambulate 50 feet with FWW SUP.         Start:  12/20/24     Expected End:  01/03/25            Pt to demo improved BLE strength by being able to complete supine/seated thera ex 2x20 BLEs with 4 or less rest breaks .         Start:  12/20/24    Expected End:  01/03/25               Pain - Adult            Education Documentation  Mobility Training, taught by Amor Chin, PT at 12/20/2024  3:39 PM.  Learner: Patient  Readiness: Acceptance  Method: Explanation  Response: Verbalizes Understanding    Education Comments  No comments found.

## 2024-12-21 LAB
ENA SCL70 AB SER QL IA: <0.2 AI
ENA SM+RNP AB SER QL IA: <0.2 AI
ENA SS-A AB SER IA-ACNC: <0.2 AI
ENA SS-B AB SER IA-ACNC: <0.2 AI
GLUCOSE BLD MANUAL STRIP-MCNC: 121 MG/DL (ref 74–99)
GLUCOSE BLD MANUAL STRIP-MCNC: 132 MG/DL (ref 74–99)
GLUCOSE BLD MANUAL STRIP-MCNC: 94 MG/DL (ref 74–99)
RHEUMATOID FACT SER NEPH-ACNC: 18 IU/ML (ref 0–15)

## 2024-12-21 PROCEDURE — 82947 ASSAY GLUCOSE BLOOD QUANT: CPT

## 2024-12-21 PROCEDURE — 2500000002 HC RX 250 W HCPCS SELF ADMINISTERED DRUGS (ALT 637 FOR MEDICARE OP, ALT 636 FOR OP/ED)

## 2024-12-21 PROCEDURE — 2500000001 HC RX 250 WO HCPCS SELF ADMINISTERED DRUGS (ALT 637 FOR MEDICARE OP)

## 2024-12-21 PROCEDURE — 1210000001 HC SEMI-PRIVATE ROOM DAILY

## 2024-12-21 PROCEDURE — 36415 COLL VENOUS BLD VENIPUNCTURE: CPT

## 2024-12-21 PROCEDURE — 99232 SBSQ HOSP IP/OBS MODERATE 35: CPT | Performed by: STUDENT IN AN ORGANIZED HEALTH CARE EDUCATION/TRAINING PROGRAM

## 2024-12-21 PROCEDURE — 2500000005 HC RX 250 GENERAL PHARMACY W/O HCPCS

## 2024-12-21 PROCEDURE — 2500000001 HC RX 250 WO HCPCS SELF ADMINISTERED DRUGS (ALT 637 FOR MEDICARE OP): Performed by: STUDENT IN AN ORGANIZED HEALTH CARE EDUCATION/TRAINING PROGRAM

## 2024-12-21 RX ORDER — GABAPENTIN 300 MG/1
300 CAPSULE ORAL 3 TIMES DAILY
Status: DISCONTINUED | OUTPATIENT
Start: 2024-12-21 | End: 2024-12-22 | Stop reason: HOSPADM

## 2024-12-21 RX ADMIN — GABAPENTIN 300 MG: 300 CAPSULE ORAL at 20:53

## 2024-12-21 RX ADMIN — AMOXICILLIN AND CLAVULANATE POTASSIUM 1 TABLET: 875; 125 TABLET, FILM COATED ORAL at 20:53

## 2024-12-21 RX ADMIN — GABAPENTIN 300 MG: 300 CAPSULE ORAL at 16:48

## 2024-12-21 RX ADMIN — METOPROLOL TARTRATE 50 MG: 50 TABLET, FILM COATED ORAL at 09:26

## 2024-12-21 RX ADMIN — METOPROLOL TARTRATE 50 MG: 50 TABLET, FILM COATED ORAL at 20:53

## 2024-12-21 RX ADMIN — Medication 100 MG: at 09:26

## 2024-12-21 RX ADMIN — AMOXICILLIN AND CLAVULANATE POTASSIUM 1 TABLET: 875; 125 TABLET, FILM COATED ORAL at 09:26

## 2024-12-21 RX ADMIN — INSULIN LISPRO 4 UNITS: 100 INJECTION, SOLUTION INTRAVENOUS; SUBCUTANEOUS at 16:48

## 2024-12-21 RX ADMIN — GABAPENTIN 300 MG: 300 CAPSULE ORAL at 09:26

## 2024-12-21 RX ADMIN — MONTELUKAST SODIUM 10 MG: 10 TABLET, FILM COATED ORAL at 20:53

## 2024-12-21 RX ADMIN — BUPROPION HYDROCHLORIDE 150 MG: 150 TABLET, EXTENDED RELEASE ORAL at 09:26

## 2024-12-21 RX ADMIN — FOLIC ACID 1 MG: 1 TABLET ORAL at 09:26

## 2024-12-21 RX ADMIN — LIDOCAINE 4% 1 PATCH: 40 PATCH TOPICAL at 09:26

## 2024-12-21 RX ADMIN — MIRTAZAPINE 15 MG: 15 TABLET, FILM COATED ORAL at 20:53

## 2024-12-21 RX ADMIN — PANTOPRAZOLE SODIUM 40 MG: 40 TABLET, DELAYED RELEASE ORAL at 06:25

## 2024-12-21 RX ADMIN — Medication 1000 MCG: at 09:26

## 2024-12-21 ASSESSMENT — PAIN SCALES - GENERAL: PAINLEVEL_OUTOF10: 0 - NO PAIN

## 2024-12-21 ASSESSMENT — LIFESTYLE VARIABLES
ANXIETY: NO ANXIETY, AT EASE
PAROXYSMAL SWEATS: NO SWEAT VISIBLE
HEADACHE, FULLNESS IN HEAD: NOT PRESENT
HEADACHE, FULLNESS IN HEAD: NOT PRESENT
VISUAL DISTURBANCES: NOT PRESENT
AGITATION: NORMAL ACTIVITY
TREMOR: NO TREMOR
TOTAL SCORE: 0
AUDITORY DISTURBANCES: NOT PRESENT
NAUSEA AND VOMITING: NO NAUSEA AND NO VOMITING
ANXIETY: NO ANXIETY, AT EASE
ORIENTATION AND CLOUDING OF SENSORIUM: ORIENTED AND CAN DO SERIAL ADDITIONS
ORIENTATION AND CLOUDING OF SENSORIUM: ORIENTED AND CAN DO SERIAL ADDITIONS
AUDITORY DISTURBANCES: NOT PRESENT
NAUSEA AND VOMITING: NO NAUSEA AND NO VOMITING
VISUAL DISTURBANCES: NOT PRESENT
TOTAL SCORE: 0
AGITATION: NORMAL ACTIVITY
PAROXYSMAL SWEATS: NO SWEAT VISIBLE
TREMOR: NO TREMOR

## 2024-12-21 NOTE — PROGRESS NOTES
"   12/21/24 0924   Discharge Planning   Home or Post Acute Services In home services   Type of Home Care Services Home OT;Home PT   Expected Discharge Disposition Home H  ([facility])     [facility] scores are PT (11) OT (16) recommending continued therapy at moderate level/intensity. Revisited with pt to discuss DC planning. Pt declined SNF, stating \"I'm not going to stay in any nursing home, the last time I was there they didn't do anything anyways.\" Offered [facility] therapy and pt is receptive to [facility], agency of choice is [facility]. Pt states PCP is Dr. Lazo. Also offered [facility] which pt declined. Dr. Banks notified of pt discharge preference of [facility]. CT team will continue monitoring case for progression and DC planning.  "

## 2024-12-21 NOTE — CARE PLAN
The patient's goals for the shift include      The clinical goals for the shift include remain safe and free from injury      Problem: Pain - Adult  Goal: Verbalizes/displays adequate comfort level or baseline comfort level  Outcome: Progressing     Problem: Safety - Adult  Goal: Free from fall injury  Outcome: Progressing     Problem: Discharge Planning  Goal: Discharge to home or other facility with appropriate resources  Outcome: Progressing     Problem: Chronic Conditions and Co-morbidities  Goal: Patient's chronic conditions and co-morbidity symptoms are monitored and maintained or improved  Outcome: Progressing     Problem: Nutrition  Goal: Less than 5 days NPO/clear liquids  Outcome: Progressing  Goal: Oral intake greater 75%  Outcome: Progressing  Goal: Consume prescribed supplement  Outcome: Progressing  Goal: Adequate PO fluid intake  Outcome: Progressing  Goal: BG  mg/dL  Outcome: Progressing  Goal: Lab values WNL  Outcome: Progressing  Goal: Electrolytes WNL  Outcome: Progressing  Goal: Maintain stable weight  Outcome: Progressing     Problem: Skin  Goal: Decreased wound size/increased tissue granulation at next dressing change  Outcome: Progressing  Goal: Participates in plan/prevention/treatment measures  Outcome: Progressing  Goal: Prevent/manage excess moisture  Outcome: Progressing  Goal: Prevent/minimize sheer/friction injuries  Outcome: Progressing  Goal: Promote/optimize nutrition  Outcome: Progressing  Goal: Promote skin healing  Outcome: Progressing     Problem: Pain  Goal: Takes deep breaths with improved pain control throughout the shift  Outcome: Progressing  Goal: Turns in bed with improved pain control throughout the shift  Outcome: Progressing  Goal: Walks with improved pain control throughout the shift  Outcome: Progressing  Goal: Performs ADL's with improved pain control throughout shift  Outcome: Progressing  Goal: Participates in PT with improved pain control throughout the  shift  Outcome: Progressing  Goal: Free from opioid side effects throughout the shift  Outcome: Progressing  Goal: Free from acute confusion related to pain meds throughout the shift  Outcome: Progressing

## 2024-12-21 NOTE — PROGRESS NOTES
Medical Group Progress Note  ASSESSMENT & PLAN:        Alcohol use disorder  Alcoholic ketoacidosis, resolved  - Initially admitted to the ICU due to severe metabolic starvation ketosis/acidosis    Community-acquired bacterial pneumonia versus aspiration  - Tolerating Augmentin well no fevers    ADELE, resolved    Generalized weakness  - PT/OT following  - AM-PAC is low but patient does not want discharge to SNF, was at [facility] recently and is requesting discharge home with home care potentially    Essential hypertension  Generalized anxiety  Peripheral neuropathy  COPD, not exacerbation  GERD  - Continue home medications as ordered    VTE Prophylaxis: SCDs (low VTE risk score)    Disposition/Daily update: Patient transferred out of the ICU overnight, this morning states that she does not want SNF placement.  Will work with PT/OT today and potentially discharge home tomorrow with home care.      ---Of note, this documentation is completed using the Dragon Dictation system (voice recognition software). There may be spelling and/or grammatical errors that were not corrected prior to final submission.---    Marshal Banks MD    SUBJECTIVE     Patient was seen and examined at bedside this morning.  She was transferred out of the ICU overnight.  This morning states that she does want to go home, not to a SNF.    OBJECTIVE:     Last Recorded Vitals:  Vitals:    12/20/24 2300 12/21/24 0600 12/21/24 0739 12/21/24 0955   BP: 132/77  117/74 124/65   BP Location:       Patient Position:       Pulse: 94  85 87   Resp: 18      Temp: 36.7 °C (98.1 °F)  36.7 °C (98.1 °F)    TempSrc:       SpO2: 97%  100%    Weight:  63.5 kg (140 lb)     Height:         Last I/O:  I/O last 3 completed shifts:  In: 3445.8 (54.3 mL/kg) [P.O.:240; I.V.:2555.8 (40.2 mL/kg); IV Piggyback:650]  Out: 3180 (50.1 mL/kg) [Urine:3180 (1.4 mL/kg/hr)]  Weight: 63.5 kg     Physical Exam  Vitals reviewed.   Constitutional:       General: She is not in  acute distress.     Appearance: Normal appearance. She is not toxic-appearing.   HENT:      Head: Normocephalic and atraumatic.   Eyes:      Extraocular Movements: Extraocular movements intact.      Conjunctiva/sclera: Conjunctivae normal.   Cardiovascular:      Rate and Rhythm: Normal rate and regular rhythm.      Pulses: Normal pulses.      Heart sounds: Normal heart sounds.   Pulmonary:      Effort: Pulmonary effort is normal. No respiratory distress.      Breath sounds: Normal breath sounds. No wheezing.   Abdominal:      General: Bowel sounds are normal.      Palpations: Abdomen is soft.      Tenderness: There is no abdominal tenderness.   Musculoskeletal:         General: Normal range of motion.      Cervical back: Normal range of motion and neck supple.      Comments: Weak in the bilateral lower extremities   Neurological:      General: No focal deficit present.      Mental Status: She is alert and oriented to person, place, and time. Mental status is at baseline.     Inpatient Medications:  amoxicillin-pot clavulanate, 1 tablet, oral, q12h SUSANA  buPROPion XL, 150 mg, oral, Daily  cyanocobalamin, 1,000 mcg, oral, Daily  folic acid, 1 mg, oral, Daily  gabapentin, 300 mg, oral, TID  insulin lispro, 0-10 Units, subcutaneous, TID AC  lidocaine, 1 patch, transdermal, Daily  [Held by provider] magnesium oxide, 400 mg, oral, BID  [Held by provider] meloxicam, 15 mg, oral, Daily with breakfast  metoprolol tartrate, 50 mg, oral, BID  mirtazapine, 15 mg, oral, Nightly  montelukast, 10 mg, oral, Nightly  oxygen, , inhalation, Continuous - 02/gases  pantoprazole, 40 mg, oral, Daily  thiamine, 100 mg, oral, Daily    PRN Medications  PRN medications: [Held by provider] diclofenac sodium, ipratropium-albuteroL, lubricating eye drops, PHENobarbital  Continuous Medications:     LABS AND IMAGING:     Labs:  Results from last 7 days   Lab Units 12/20/24  0418 12/19/24  0626 12/18/24  2125   WBC AUTO x10*3/uL 5.6 6.9 9.8   RBC  AUTO x10*6/uL 3.24* 3.64* 4.13   HEMOGLOBIN g/dL 9.6* 10.7* 12.4   HEMATOCRIT % 29.2* 34.0* 41.9   MCV fL 90 93 102*   MCH pg 29.6 29.4 30.0   MCHC g/dL 32.9 31.5* 29.6*   RDW % 15.9* 16.0* 16.2*   PLATELETS AUTO x10*3/uL 140* 167 219     Results from last 7 days   Lab Units 12/20/24  1156 12/20/24  0418 12/20/24  0005 12/18/24  2355 12/18/24  2125   SODIUM mmol/L 136 138 136   < > 137   POTASSIUM mmol/L 3.9 2.9* 3.1*   < > 4.3   CHLORIDE mmol/L 112* 111* 108*   < > 104   CO2 mmol/L 18* 19* 19*   < > 5*   BUN mg/dL 7 9 10   < > 15   CREATININE mg/dL 0.76 0.86 0.95   < > 1.43*   GLUCOSE mg/dL 164* 164* 155*   < > 145*   PROTEIN TOTAL g/dL  --   --   --   --  7.8   CALCIUM mg/dL 8.4* 8.3* 8.8   < > 8.6   BILIRUBIN TOTAL mg/dL  --   --   --   --  0.4   ALK PHOS U/L  --   --   --   --  98   AST U/L  --   --   --   --  31   ALT U/L  --   --   --   --  21    < > = values in this interval not displayed.     Results from last 7 days   Lab Units 12/20/24  1156 12/20/24  0418 12/20/24  0005   MAGNESIUM mg/dL 1.83 1.93 2.04     Results from last 7 days   Lab Units 12/18/24  2220 12/18/24  2125   TROPHS ng/L 8 7     Imaging:  ECG 12 lead  Sinus tachycardia  Biatrial enlargement  Nonspecific ST abnormality  Abnormal ECG  When compared with ECG of 25-MAR-2024 22:44,  Vent. rate has increased BY  52 BPM  Non-specific change in ST segment in Inferior leads  ST now depressed in Anterolateral leads  T wave inversion no longer evident in Anterior leads  See ED provider note for full interpretation and clinical correlation  Confirmed by Calli Pabon ([id number]) on 12/19/2024 6:40:37 PM  Transthoracic Echo (TTE) [place] [id number]   Tel [phone] Fax [phone]    TRANSTHORACIC ECHOCARDIOGRAM REPORT    Patient Name:       AYESHA BENAVIDEZLIN        Reading Physician:    [id number] Robert Matta MD, [facility]  Study  Date:         12/19/2024            Ordering Provider:    [id number] CARLOS MANUEL NIÑO  MRN/PID:            [id number]              Fellow:  Accession#:         [id number]          Nurse:  Date of Birth/Age:  1965 / 59 years  Sonographer:          Nallely RIVAS  Gender Assigned at  F                     Additional Staff:  Birth:  Height:             175.26 cm             Admit Date:  Weight:             58.97 kg              Admission Status:  BSA / BMI:          1.72 m2 / 19.20 kg/m2 Department Location:  Blood Pressure: 96 /63 mmHg    Study Type:    TRANSTHORACIC ECHO (TTE) COMPLETE  Diagnosis/ICD: Chest pain, unspecified-R07.9  Indication:    Chest Pain  CPT Codes:     Echo Complete w Full [id number]    Patient History:  Pertinent History: HTN, COPD and Chest Pain.    Study Detail: The following Echo studies were performed: 2D, M-Mode, Doppler and                color flow. The patient was awake.       PHYSICIAN INTERPRETATION:  Left Ventricle: The left ventricular systolic function is normal, with a visually estimated ejection fraction of 60-65%. There is mild concentric left ventricular hypertrophy. There are no regional wall motion abnormalities. The left ventricular cavity size is normal. There is mild increased septal and mildly increased posterior left ventricular wall thickness. Spectral Doppler shows a normal pattern of left ventricular diastolic filling.  Left Atrium: The left atrium is normal in size. Left atrial volume index 28.2 mL/m2.  Right Ventricle: The right ventricle is normal in size. There is normal right ventricular global systolic function.  Right Atrium: The right atrium is mild to moderately dilated.  Aortic Valve: The aortic valve is trileaflet. The aortic valve dimensionless index is 0.88. There is no evidence of aortic valve regurgitation. The peak  instantaneous gradient of the aortic valve is 6 mmHg. The mean gradient of the aortic valve is 3 mmHg.  Mitral Valve: The mitral valve is normal in structure. There is trace to mild mitral valve regurgitation. Trivial to 1+ mitral regurgitation.  Tricuspid Valve: The tricuspid valve is structurally normal. There is moderate tricuspid regurgitation. 2-3+ tricuspid regurgitation with estimated RVSP of 49 mmHg consistent with moderately elevated right-sided pressures.  Pulmonic Valve: The pulmonic valve is structurally normal. There is no indication of pulmonic valve regurgitation.  Pericardium: No pericardial effusion noted.  Aorta: The aortic root is normal.  Systemic Veins: The inferior vena cava appears normal in size, with IVC inspiratory collapse greater than 50%.  In comparison to the previous echocardiogram(s): There are no prior studies on this patient for comparison purposes.       CONCLUSIONS:   1. The left ventricular systolic function is normal, with a visually estimated ejection fraction of 60-65%.   2. No regional wall motion abnormalities.   3. There is normal right ventricular global systolic function.   4. The right atrium is mild to moderately dilated.   5. Trivial to 1+ mitral regurgitation.   6. 2-3+ tricuspid regurgitation with estimated RVSP of 49 mmHg consistent with moderately elevated right-sided pressures.   7. Moderate tricuspid regurgitation.    QUANTITATIVE DATA SUMMARY:     2D MEASUREMENTS:           Normal Ranges:  Ao Root d:       2.70 cm   (2.0-3.7cm)  LAs:             3.30 cm   (2.7-4.0cm)  IVSd:            1.20 cm   (0.6-1.1cm)  LVPWd:           1.20 cm   (0.6-1.1cm)  LVIDd:           4.10 cm   (3.9-5.9cm)  LVIDs:           2.90 cm  LV Mass Index:   99.8 g/m2  LV % FS          29.3 %       LA VOLUME:                    Normal Ranges:  LA Vol A4C:        36.6 ml    (22+/-6mL/m2)  LA Vol A2C:        60.9 ml  LA Vol BP:         48.5 ml  LA Vol Index A4C:  21.3ml/m2  LA Vol Index A2C:   35.4 ml/m2  LA Vol Index BP:   28.2 ml/m2  LA Area A4C:       15.1 cm2  LA Area A2C:       20.0 cm2  LA Major Axis A4C: 5.3 cm  LA Major Axis A2C: 5.6 cm  LA Volume Index:   26.3 ml/m2       RA VOLUME BY A/L METHOD:            Normal Ranges:  RA Vol A4C:              69.9 ml    (8.3-19.5ml)  RA Vol Index A4C:        40.7 ml/m2  RA Area A4C:             21.6 cm2  RA Major Axis A4C:       5.7 cm       AORTA MEASUREMENTS:         Normal Ranges:  Asc Ao, d:          2.70 cm (2.1-3.4cm)       LV SYSTOLIC FUNCTION BY 2D PLANIMETRY (MOD):                       Normal Ranges:  EF-A4C View:    59 % (>=55%)  EF-A2C View:    64 %  EF-Biplane:     62 %  EF-Visual:      63 %  LV EF Reported: 63 %       LV DIASTOLIC FUNCTION:             Normal Ranges:  MV Peak E:             0.58 m/s    (0.7-1.2 m/s)  MV Peak A:             0.56 m/s    (0.42-0.7 m/s)  E/A Ratio:             1.04        (1.0-2.2)  MV e'                  0.106 m/s   (>8.0)  MV lateral e'          0.12 m/s  MV medial e'           0.09 m/s  E/e' Ratio:            5.46        (<8.0)  PulmV Sys Santana:         38.60 cm/s  PulmV Stinson Santana:        32.70 cm/s  PulmV S/D Santana:         1.20  PulmV A Revs Santana:      29.40 cm/s  PulmV A Revs Dur:      116.00 msec       MITRAL VALVE:          Normal Ranges:  MV DT:        222 msec (150-240msec)       AORTIC VALVE:                     Normal Ranges:  AoV Vmax:                1.24 m/s (<=1.7m/s)  AoV Peak P.2 mmHg (<20mmHg)  AoV Mean PG:             3.0 mmHg (1.7-11.5mmHg)  LVOT Max Santana:            1.03 m/s (<=1.1m/s)  AoV VTI:                 22.90 cm (18-25cm)  LVOT VTI:                20.10 cm  LVOT Diameter:           2.00 cm  (1.8-2.4cm)  AoV Area, VTI:           2.76 cm2 (2.5-5.5cm2)  AoV Area,Vmax:           2.61 cm2 (2.5-4.5cm2)  AoV Dimensionless Index: 0.88       RIGHT VENTRICLE:  RV Basal 3.30 cm  RV Mid   2.70 cm  RV Major 6.8 cm  TAPSE:   20.4 mm  RV s'    0.10 m/s       TRICUSPID VALVE/RVSP:           Normal Ranges:  Peak TR Velocity:     3.48 m/s  RV Syst Pressure:     51 mmHg  (< 30mmHg)  IVC Diam:             0.90 cm       PULMONIC VALVE:          Normal Ranges:  PV Accel Time:  121 msec (>120ms)  PV Max Santana:     0.9 m/s  (0.6-0.9m/s)  PV Max PG:      3.3 mmHg       Pulmonary Veins:  PulmV A Revs Dur: 116.00 msec  PulmV A Revs Santana: 29.40 cm/s  PulmV Stinson Santana:   32.70 cm/s  PulmV S/D Santana:    1.20  PulmV Sys Santana:    38.60 cm/s       [id number] Robert Matta MD, FACC  Electronically signed on 12/19/2024 at 2:36:57 PM       ** Final **

## 2024-12-21 NOTE — NURSING NOTE
12 hr post ICU transfer out chart review:  Pt admitted with severe metabolic acidosis pH 6.95, bicarb 5, pVCO2 23, beta hydroxy 12.91.  Pt treated for pneumonia, de-escalated to Augmentin PO. On room air with stable vitals and unremarkable lab values. Appropriate setting on general medical floor at this time.

## 2024-12-21 NOTE — CARE PLAN
Problem: Pain - Adult  Goal: Verbalizes/displays adequate comfort level or baseline comfort level  Outcome: Progressing  Flowsheets (Taken 12/21/2024 1010)  Verbalizes/displays adequate comfort level or baseline comfort level:   Encourage patient to monitor pain and request assistance   Assess pain using appropriate pain scale   Administer analgesics based on type and severity of pain and evaluate response     Problem: Safety - Adult  Goal: Free from fall injury  Outcome: Progressing  Flowsheets (Taken 12/21/2024 1010)  Free from fall injury:   Instruct family/caregiver on patient safety   Based on caregiver fall risk screen, instruct family/caregiver to ask for assistance with transferring infant if caregiver noted to have fall risk factors     Problem: Discharge Planning  Goal: Discharge to home or other facility with appropriate resources  Outcome: Progressing  Flowsheets (Taken 12/21/2024 1010)  Discharge to home or other facility with appropriate resources:   Identify barriers to discharge with patient and caregiver   Arrange for needed discharge resources and transportation as appropriate   Identify discharge learning needs (meds, wound care, etc)     Problem: Chronic Conditions and Co-morbidities  Goal: Patient's chronic conditions and co-morbidity symptoms are monitored and maintained or improved  Outcome: Progressing  Flowsheets (Taken 12/21/2024 1010)  Care Plan - Patient's Chronic Conditions and Co-Morbidity Symptoms are Monitored and Maintained or Improved:   Collaborate with multidisciplinary team to address chronic and comorbid conditions and prevent exacerbation or deterioration   Monitor and assess patient's chronic conditions and comorbid symptoms for stability, deterioration, or improvement     Problem: Nutrition  Goal: Less than 5 days NPO/clear liquids  Outcome: Progressing  Goal: Oral intake greater 75%  Outcome: Progressing  Goal: Consume prescribed supplement  Outcome: Progressing  Goal:  Adequate PO fluid intake  Outcome: Progressing  Goal: BG  mg/dL  Outcome: Progressing  Goal: Lab values WNL  Outcome: Progressing  Goal: Electrolytes WNL  Outcome: Progressing  Goal: Maintain stable weight  Outcome: Progressing     Problem: Skin  Goal: Decreased wound size/increased tissue granulation at next dressing change  Outcome: Progressing  Goal: Participates in plan/prevention/treatment measures  Outcome: Progressing  Goal: Prevent/manage excess moisture  Outcome: Progressing  Goal: Prevent/minimize sheer/friction injuries  Outcome: Progressing  Goal: Promote/optimize nutrition  Outcome: Progressing  Goal: Promote skin healing  Outcome: Progressing     Problem: Pain  Goal: Takes deep breaths with improved pain control throughout the shift  Outcome: Progressing  Goal: Turns in bed with improved pain control throughout the shift  Outcome: Progressing  Goal: Walks with improved pain control throughout the shift  Outcome: Progressing  Goal: Performs ADL's with improved pain control throughout shift  Outcome: Progressing  Goal: Participates in PT with improved pain control throughout the shift  Outcome: Progressing  Goal: Free from opioid side effects throughout the shift  Outcome: Progressing  Goal: Free from acute confusion related to pain meds throughout the shift  Outcome: Progressing    The clinical goals for the shift include patient will be safe and free from falls for shift

## 2024-12-22 ENCOUNTER — HOME HEALTH ADMISSION (OUTPATIENT)
Dept: HOME HEALTH SERVICES | Facility: HOME HEALTH | Age: 59
End: 2024-12-22
Payer: MEDICAID

## 2024-12-22 VITALS
RESPIRATION RATE: 18 BRPM | TEMPERATURE: 97.5 F | DIASTOLIC BLOOD PRESSURE: 71 MMHG | HEART RATE: 77 BPM | BODY MASS INDEX: 23.12 KG/M2 | HEIGHT: 69 IN | WEIGHT: 156.09 LBS | SYSTOLIC BLOOD PRESSURE: 124 MMHG | OXYGEN SATURATION: 99 %

## 2024-12-22 LAB
ANION GAP SERPL CALC-SCNC: 14 MMOL/L (ref 10–20)
BACTERIA BLD CULT: NORMAL
BACTERIA BLD CULT: NORMAL
BASOPHILS # BLD AUTO: 0.04 X10*3/UL (ref 0–0.1)
BASOPHILS NFR BLD AUTO: 0.6 %
BUN SERPL-MCNC: 7 MG/DL (ref 6–23)
CALCIUM SERPL-MCNC: 8.8 MG/DL (ref 8.6–10.3)
CHLORIDE SERPL-SCNC: 105 MMOL/L (ref 98–107)
CO2 SERPL-SCNC: 24 MMOL/L (ref 21–32)
CREAT SERPL-MCNC: 0.62 MG/DL (ref 0.5–1.05)
EGFRCR SERPLBLD CKD-EPI 2021: >90 ML/MIN/1.73M*2
EOSINOPHIL # BLD AUTO: 0.16 X10*3/UL (ref 0–0.7)
EOSINOPHIL NFR BLD AUTO: 2.6 %
ERYTHROCYTE [DISTWIDTH] IN BLOOD BY AUTOMATED COUNT: 15.7 % (ref 11.5–14.5)
GLUCOSE BLD MANUAL STRIP-MCNC: 100 MG/DL (ref 74–99)
GLUCOSE BLD MANUAL STRIP-MCNC: 116 MG/DL (ref 74–99)
GLUCOSE BLD MANUAL STRIP-MCNC: 119 MG/DL (ref 74–99)
GLUCOSE SERPL-MCNC: 105 MG/DL (ref 74–99)
HCT VFR BLD AUTO: 32.1 % (ref 36–46)
HGB BLD-MCNC: 10.7 G/DL (ref 12–16)
HOLD SPECIMEN: NORMAL
IMM GRANULOCYTES # BLD AUTO: 0.05 X10*3/UL (ref 0–0.7)
IMM GRANULOCYTES NFR BLD AUTO: 0.8 % (ref 0–0.9)
LYMPHOCYTES # BLD AUTO: 1.84 X10*3/UL (ref 1.2–4.8)
LYMPHOCYTES NFR BLD AUTO: 29.3 %
MAGNESIUM SERPL-MCNC: 1.37 MG/DL (ref 1.6–2.4)
MCH RBC QN AUTO: 30.1 PG (ref 26–34)
MCHC RBC AUTO-ENTMCNC: 33.3 G/DL (ref 32–36)
MCV RBC AUTO: 90 FL (ref 80–100)
MONOCYTES # BLD AUTO: 0.4 X10*3/UL (ref 0.1–1)
MONOCYTES NFR BLD AUTO: 6.4 %
NEUTROPHILS # BLD AUTO: 3.78 X10*3/UL (ref 1.2–7.7)
NEUTROPHILS NFR BLD AUTO: 60.3 %
NRBC BLD-RTO: 0 /100 WBCS (ref 0–0)
PLATELET # BLD AUTO: 175 X10*3/UL (ref 150–450)
POTASSIUM SERPL-SCNC: 3.5 MMOL/L (ref 3.5–5.3)
RBC # BLD AUTO: 3.55 X10*6/UL (ref 4–5.2)
SODIUM SERPL-SCNC: 139 MMOL/L (ref 136–145)
WBC # BLD AUTO: 6.3 X10*3/UL (ref 4.4–11.3)

## 2024-12-22 PROCEDURE — 2500000001 HC RX 250 WO HCPCS SELF ADMINISTERED DRUGS (ALT 637 FOR MEDICARE OP)

## 2024-12-22 PROCEDURE — 36415 COLL VENOUS BLD VENIPUNCTURE: CPT | Performed by: STUDENT IN AN ORGANIZED HEALTH CARE EDUCATION/TRAINING PROGRAM

## 2024-12-22 PROCEDURE — 2500000005 HC RX 250 GENERAL PHARMACY W/O HCPCS

## 2024-12-22 PROCEDURE — 2500000004 HC RX 250 GENERAL PHARMACY W/ HCPCS (ALT 636 FOR OP/ED): Performed by: STUDENT IN AN ORGANIZED HEALTH CARE EDUCATION/TRAINING PROGRAM

## 2024-12-22 PROCEDURE — 2500000001 HC RX 250 WO HCPCS SELF ADMINISTERED DRUGS (ALT 637 FOR MEDICARE OP): Performed by: STUDENT IN AN ORGANIZED HEALTH CARE EDUCATION/TRAINING PROGRAM

## 2024-12-22 PROCEDURE — 99239 HOSP IP/OBS DSCHRG MGMT >30: CPT | Performed by: STUDENT IN AN ORGANIZED HEALTH CARE EDUCATION/TRAINING PROGRAM

## 2024-12-22 PROCEDURE — 97530 THERAPEUTIC ACTIVITIES: CPT | Mod: GP,CQ

## 2024-12-22 PROCEDURE — 80048 BASIC METABOLIC PNL TOTAL CA: CPT | Performed by: STUDENT IN AN ORGANIZED HEALTH CARE EDUCATION/TRAINING PROGRAM

## 2024-12-22 PROCEDURE — 82947 ASSAY GLUCOSE BLOOD QUANT: CPT

## 2024-12-22 PROCEDURE — 83735 ASSAY OF MAGNESIUM: CPT | Performed by: STUDENT IN AN ORGANIZED HEALTH CARE EDUCATION/TRAINING PROGRAM

## 2024-12-22 PROCEDURE — 85025 COMPLETE CBC W/AUTO DIFF WBC: CPT

## 2024-12-22 RX ORDER — AMOXICILLIN AND CLAVULANATE POTASSIUM 875; 125 MG/1; MG/1
1 TABLET, FILM COATED ORAL EVERY 12 HOURS SCHEDULED
Qty: 7 TABLET | Refills: 0 | Status: SHIPPED | OUTPATIENT
Start: 2024-12-22 | End: 2024-12-26

## 2024-12-22 RX ORDER — MAGNESIUM SULFATE HEPTAHYDRATE 40 MG/ML
4 INJECTION, SOLUTION INTRAVENOUS ONCE
Status: COMPLETED | OUTPATIENT
Start: 2024-12-22 | End: 2024-12-22

## 2024-12-22 RX ADMIN — Medication 1000 MCG: at 09:34

## 2024-12-22 RX ADMIN — GABAPENTIN 300 MG: 300 CAPSULE ORAL at 09:34

## 2024-12-22 RX ADMIN — AMOXICILLIN AND CLAVULANATE POTASSIUM 1 TABLET: 875; 125 TABLET, FILM COATED ORAL at 17:45

## 2024-12-22 RX ADMIN — Medication 100 MG: at 09:34

## 2024-12-22 RX ADMIN — LIDOCAINE 4% 1 PATCH: 40 PATCH TOPICAL at 09:35

## 2024-12-22 RX ADMIN — BUPROPION HYDROCHLORIDE 150 MG: 150 TABLET, EXTENDED RELEASE ORAL at 09:34

## 2024-12-22 RX ADMIN — AMOXICILLIN AND CLAVULANATE POTASSIUM 1 TABLET: 875; 125 TABLET, FILM COATED ORAL at 09:34

## 2024-12-22 RX ADMIN — MAGNESIUM SULFATE HEPTAHYDRATE 4 G: 40 INJECTION, SOLUTION INTRAVENOUS at 11:58

## 2024-12-22 RX ADMIN — GABAPENTIN 300 MG: 300 CAPSULE ORAL at 15:22

## 2024-12-22 RX ADMIN — FOLIC ACID 1 MG: 1 TABLET ORAL at 09:34

## 2024-12-22 RX ADMIN — METOPROLOL TARTRATE 50 MG: 50 TABLET, FILM COATED ORAL at 09:35

## 2024-12-22 RX ADMIN — PANTOPRAZOLE SODIUM 40 MG: 40 TABLET, DELAYED RELEASE ORAL at 06:56

## 2024-12-22 ASSESSMENT — COGNITIVE AND FUNCTIONAL STATUS - GENERAL
CLIMB 3 TO 5 STEPS WITH RAILING: A LOT
TURNING FROM BACK TO SIDE WHILE IN FLAT BAD: A LITTLE
CLIMB 3 TO 5 STEPS WITH RAILING: A LOT
WALKING IN HOSPITAL ROOM: A LOT
MOBILITY SCORE: 17
TURNING FROM BACK TO SIDE WHILE IN FLAT BAD: A LITTLE
WALKING IN HOSPITAL ROOM: A LITTLE
DAILY ACTIVITIY SCORE: 23
MOVING TO AND FROM BED TO CHAIR: A LITTLE
STANDING UP FROM CHAIR USING ARMS: A LITTLE
MOVING TO AND FROM BED TO CHAIR: A LITTLE
TOILETING: A LITTLE
MOVING FROM LYING ON BACK TO SITTING ON SIDE OF FLAT BED WITH BEDRAILS: A LITTLE
STANDING UP FROM CHAIR USING ARMS: A LITTLE
MOBILITY SCORE: 17

## 2024-12-22 ASSESSMENT — PAIN SCALES - GENERAL
PAINLEVEL_OUTOF10: 0 - NO PAIN
PAINLEVEL_OUTOF10: 0 - NO PAIN

## 2024-12-22 ASSESSMENT — PAIN - FUNCTIONAL ASSESSMENT: PAIN_FUNCTIONAL_ASSESSMENT: 0-10

## 2024-12-22 NOTE — NURSING NOTE
Labs and vitals stable, on PO antibiotics with plan for discharge to home with home health care. Generalized weakness but pt insists she can manage care at home. Stable for general medical floor disposition.

## 2024-12-22 NOTE — PROGRESS NOTES
12/22/24 1129   Discharge Planning   Home or Post Acute Services In home services   Type of Home Care Services Home nursing visits;Home OT;Home PT   Expected Discharge Disposition Home H  ([facility])     Per Attending, pt medically ready for DC today, pt continues to refuse SNF, pt discharge preference remains home with [facility]. [facility]  notified of HHC referral/HCO in Epic, awaiting confirmation of SOC.    Update: [facility]  confirms HHC referral/HCO received and processed for SOC in 24-48 hours.

## 2024-12-22 NOTE — CARE PLAN
Problem: Pain - Adult  Goal: Verbalizes/displays adequate comfort level or baseline comfort level  12/22/2024 1752 by Xiao Hughes RN  Outcome: Adequate for Discharge  12/22/2024 0748 by Xiao Hughes RN  Flowsheets (Taken 12/22/2024 0748)  Verbalizes/displays adequate comfort level or baseline comfort level:   Encourage patient to monitor pain and request assistance   Assess pain using appropriate pain scale   Administer analgesics based on type and severity of pain and evaluate response   Implement non-pharmacological measures as appropriate and evaluate response   Consider cultural and social influences on pain and pain management   Notify Licensed Independent Practitioner if interventions unsuccessful or patient reports new pain     Problem: Safety - Adult  Goal: Free from fall injury  12/22/2024 1752 by Xiao Hughes RN  Outcome: Adequate for Discharge  12/22/2024 0748 by Xiao Hughes RN  Flowsheets (Taken 12/22/2024 0748)  Free from fall injury: Instruct family/caregiver on patient safety     Problem: Discharge Planning  Goal: Discharge to home or other facility with appropriate resources  12/22/2024 1752 by Xiao Hughes RN  Outcome: Adequate for Discharge  12/22/2024 0748 by Xiao Hughes RN  Flowsheets (Taken 12/22/2024 0748)  Discharge to home or other facility with appropriate resources:   Identify barriers to discharge with patient and caregiver   Arrange for needed discharge resources and transportation as appropriate   Identify discharge learning needs (meds, wound care, etc)   Refer to discharge planning if patient needs post-hospital services based on physician order or complex needs related to functional status, cognitive ability or social support system     Problem: Chronic Conditions and Co-morbidities  Goal: Patient's chronic conditions and co-morbidity symptoms are monitored and maintained or improved  12/22/2024 1752 by Xiao Hughes RN  Outcome: Adequate for  Discharge  12/22/2024 0748 by Xiao Hughes RN  Flowsheets (Taken 12/22/2024 0748)  Care Plan - Patient's Chronic Conditions and Co-Morbidity Symptoms are Monitored and Maintained or Improved:   Monitor and assess patient's chronic conditions and comorbid symptoms for stability, deterioration, or improvement   Collaborate with multidisciplinary team to address chronic and comorbid conditions and prevent exacerbation or deterioration   Update acute care plan with appropriate goals if chronic or comorbid symptoms are exacerbated and prevent overall improvement and discharge     Problem: Nutrition  Goal: Less than 5 days NPO/clear liquids  Outcome: Adequate for Discharge  Goal: Oral intake greater 75%  Outcome: Adequate for Discharge  Goal: Consume prescribed supplement  Outcome: Adequate for Discharge  Goal: Adequate PO fluid intake  Outcome: Adequate for Discharge  Goal: BG  mg/dL  Outcome: Adequate for Discharge  Goal: Lab values WNL  Outcome: Adequate for Discharge  Goal: Electrolytes WNL  Outcome: Adequate for Discharge  Goal: Maintain stable weight  Outcome: Adequate for Discharge     Problem: Skin  Goal: Participates in plan/prevention/treatment measures  12/22/2024 1752 by Xiao Hughes RN  Outcome: Adequate for Discharge  12/22/2024 0748 by Xiao Hughes RN  Flowsheets (Taken 12/22/2024 0748)  Participates in plan/prevention/treatment measures:   Discuss with provider PT/OT consult   Elevate heels   Increase activity/out of bed for meals  Goal: Prevent/manage excess moisture  12/22/2024 1752 by Xiao Hughes RN  Outcome: Adequate for Discharge  12/22/2024 0748 by Xiao Hughes RN  Flowsheets (Taken 12/22/2024 0748)  Prevent/manage excess moisture:   Cleanse incontinence/protect with barrier cream   Moisturize dry skin  Goal: Prevent/minimize sheer/friction injuries  12/22/2024 1752 by Xiao Hughes RN  Outcome: Adequate for Discharge  12/22/2024 0748 by Xiao Hughes RN  Flowsheets  (Taken 12/22/2024 0748)  Prevent/minimize sheer/friction injuries:   Complete micro-shifts as needed if patient unable. Adjust patient position to relieve pressure points, not a full turn   Increase activity/out of bed for meals   Use pull sheet   HOB 30 degrees or less   Turn/reposition every 2 hours/use positioning/transfer devices  Goal: Promote/optimize nutrition  12/22/2024 1752 by Xiao Hughes RN  Outcome: Adequate for Discharge  12/22/2024 0748 by Xiao Hughes RN  Flowsheets (Taken 12/22/2024 0748)  Promote/optimize nutrition:   Monitor/record intake including meals   Offer water/supplements/favorite foods     Problem: Pain  Goal: Takes deep breaths with improved pain control throughout the shift  Outcome: Adequate for Discharge  Goal: Turns in bed with improved pain control throughout the shift  Outcome: Adequate for Discharge  Goal: Walks with improved pain control throughout the shift  Outcome: Adequate for Discharge  Goal: Performs ADL's with improved pain control throughout shift  Outcome: Adequate for Discharge  Goal: Participates in PT with improved pain control throughout the shift  Outcome: Adequate for Discharge  Goal: Free from opioid side effects throughout the shift  Outcome: Adequate for Discharge  Goal: Free from acute confusion related to pain meds throughout the shift  Outcome: Adequate for Discharge     Problem: Fall/Injury  Goal: Not fall by end of shift  Outcome: Adequate for Discharge  Goal: Be free from injury by end of the shift  Outcome: Adequate for Discharge  Goal: Verbalize understanding of personal risk factors for fall in the hospital  Outcome: Adequate for Discharge  Goal: Verbalize understanding of risk factor reduction measures to prevent injury from fall in the home  Outcome: Adequate for Discharge  Goal: Use assistive devices by end of the shift  Outcome: Adequate for Discharge  Goal: Pace activities to prevent fatigue by end of the shift  Outcome: Adequate for  Discharge   The patient's goals for the shift include      The clinical goals for the shift include Patient will remain free from falls and injury throughout this shift.    Over the shift, the patient did make progress adequate for discharge toward care plan goals.

## 2024-12-22 NOTE — PROGRESS NOTES
Physical Therapy    Physical Therapy Treatment    Patient Name: Jolnata Castaneda  MRN: [id number]  Today's Date: 12/22/2024  Time Calculation  Start Time: 1233  Stop Time: 1300  Time Calculation (min): 27 min     1116/1116-A    Assessment/Plan   PT Assessment  PT Assessment Results: Decreased strength, Decreased endurance, Impaired balance, Decreased mobility, Decreased coordination, Pain  Rehab Prognosis: Good  Treatment Tolerance: Patient tolerated treatment well  Medical Staff Made Aware: Yes  Barriers to Participation: Comorbidities  End of Session Communication: Bedside nurse, PCT/NA/CTA  Assessment Comment: Patient continues to require (A) for safety with transfers/amb; showing progress towards goals. Patient will benefit from additional PT to address deficits and improve mobility.  End of Session Patient Position: Bed, 3 rail up, Alarm on (Sitting EOB; Call light, phone, and tray table within reach; Nurse with patient at end of PT session.)  PT Plan  Inpatient/Swing Bed or Outpatient: Inpatient  Treatment/Interventions: Bed mobility, Transfer training, Gait training, Stair training, Balance training, Strengthening, Endurance training, Therapeutic exercise, Therapeutic activity  PT Plan: Ongoing PT  PT Frequency: 3 times per week  PT Discharge Recommendations: Moderate intensity level of continued care    PT Recommended Transfer Status: Assist x1, Assistive device    General Visit Information:   PT  Visit  PT Received On: 12/22/24  General  Family/Caregiver Present: No  Prior to Session Communication: Bedside nurse  Patient Position Received: Up in chair, Alarm on  General Comment: Pleasant and cooperative. States she is supposed to be discharged home later today. Agreeable ot participate in therapy.    General Observations:   General Observation: No tele; IV; Alarm.    Subjective     Precautions:  Precautions  Hearing/Visual Limitations: Legally Blind  Medical Precautions: Fall precautions    Objective      Pain:  Pain Assessment  Pain Assessment: 0-10  0-10 (Numeric) Pain Score: 0 - No pain    Cognition:  Cognition  Overall Cognitive Status: Within Functional Limits    Balance:   Static Sitting Balance  Static Sitting-Comment/Number of Minutes: G  Dynamic Sitting Balance  Dynamic Sitting-Comments: G-  Static Standing Balance  Static Standing-Comment/Number of Minutes: F+ with ww  Dynamic Standing Balance  Dynamic Standing-Comments: F+ with ww    Treatments:        Balance/Neuromuscular Re-Education  Balance/Neuromuscular Re-Education Activity Performed: Yes  Balance/Neuromuscular Re-Education Activity 1: Multi-level dynamic reaching in standing position (L/R/C/Across midline) with x1UE support. No LOB.     Ambulation/Gait Training  Ambulation/Gait Training Performed: Yes  Ambulation/Gait Training 1  Surface 1: Level tile  Device 1: Rolling walker  Gait Support Devices: Gait belt  Assistance 1:  (SBA)  Comments/Distance (ft) 1: ~300ft. NBOS. Slow mikie. Slightly forward flexed posture. Step through gait pattern. Decreased step height/length B. v/c and (A) for safer maneuvering of ww with 90/180° turns and around obstacles in pathway. No LOB. Patient able to amb without AD ~5ft at bedside, but reaches for support from her surroundings when not using AD. Will continue to recommed ww for support with transfers/amb for safety at this time.  Transfers  Transfer: Yes  Transfer 1  Technique 1: Sit to stand, Stand to sit  Transfer Device 1: Gait belt (ww)  Transfer Level of Assistance 1:  (SBA)  Trials/Comments 1: (2x). v/c for safe hand placement and technique. Slow transition of hands to/from ww. Benefits from elevated surfaces.             Outcome Measures:     [facility] Basic Mobility  Turning from your back to your side while in a flat bed without using bedrails: A little  Moving from lying on your back to sitting on the side of a flat bed without using bedrails: A little  Moving to and from bed to chair (including a  wheelchair): A little  Standing up from a chair using your arms (e.g. wheelchair or bedside chair): A little  To walk in hospital room: A little  Climbing 3-5 steps with railing: A lot  Basic Mobility - Total Score: 17                                      Education Documentation  Mobility Training, taught by Jacey Kennedy PTA at 12/22/2024  1:39 PM.  Learner: Patient  Readiness: Acceptance  Method: Explanation, Demonstration, Teach-back  Response: Verbalizes Understanding, Needs Reinforcement  Comment: See therapy note.           EDUCATION:  Individual(s) Educated: Patient  Education Provided: Body Mechanics, Fall Risk, Home Safety, POC, Posture (Balance; Safety with transfers/amb; Recommendation for using ww for support with transfers/amb.)  Patient Response to Education: Patient/Caregiver Verbalized Understanding of Information, Patient/Caregiver Performed Return Demonstration of Exercises/Activities, Patient/Caregiver Asked Appropriate Questions    Encounter Problems       Encounter Problems (Active)       PT Problem       Pt will demonstrate SUP with bed mobility to edge of bed.   (Progressing)       Start:  12/20/24    Expected End:  01/03/25            Pt will demonstrate SUP with sit to stand/chair transfers with FWW.   (Progressing)       Start:  12/20/24    Expected End:  01/03/25            Pt will ambulate 50 feet with FWW SUP.   (Progressing)       Start:  12/20/24    Expected End:  01/03/25            Pt to demo improved BLE strength by being able to complete supine/seated thera ex 2x20 BLEs with 4 or less rest breaks .   (Not Progressing)       Start:  12/20/24    Expected End:  01/03/25

## 2024-12-22 NOTE — DISCHARGE SUMMARY
Medical Group Discharge Summary  DISCHARGE DIAGNOSIS     Community-acquired bacterial pneumonia versus aspiration pneumonia  Alcohol use disorder  Metabolic/alcoholic ketoacidosis, resolved  ADELE, resolved  Generalized weakness  Hypomagnesemia     HOSPITAL COURSE AND DETAILS     This is a 59-year-old female with a significant past medical history of GERD, hypertension, COPD, peripheral neuropathy, alcohol use disorder, legally blind patient that presented from home with chief complaints of chest pain, vomiting with decreased appetite.  Patient was initially admitted to the ICU due to profound metabolic acidosis with a bicarb of 5, pH of 6.9 and a lactic acid of 2.3 on arrival.  Patient was aggressively resuscitated with IV fluids with improvement in overall clinical as well as labs status.    Patient was treated with CIWA protocol in the ICU and with improvement in labs was transferred to the medical floor. During hospitalization, course was complicated with fevers and therefore started on IV antibiotics for pneumonia.  Was de-escalated to an oral regimen on discharge and will complete a short course as prescribed by the ICU.    Patient worked with PT/OT and deemed a candidate for SNF placement however patient did refuse this.  She would like to be discharged home with home care.  Was advised to follow-up with PCP to discuss hospitalization.  She was advised to continue alcohol abstinence as well.    Patient was also seen by the substance abuse  during hospitalization and she declined any outpatient needs.    Total time spent on discharge: >30min      ---Of note, this documentation is completed using the Dragon Dictation system (voice recognition software). There may be spelling and/or grammatical errors that were not corrected prior to final submission.---    Marshal Banks MD    DISCHARGE PHYSICAL EXAM     Last Recorded Vitals:  Vitals:    12/21/24 1526 12/21/24 2300 12/22/24 0600 12/22/24 0755    BP: 114/74 117/68  135/68   Pulse: 88 85  88   Resp:       Temp: 37.3 °C (99.1 °F) 36.7 °C (98.1 °F)  36.6 °C (97.9 °F)   TempSrc:       SpO2: 95% 97%  97%   Weight:   70.8 kg (156 lb 1.4 oz)    Height:           Physical Exam  Vitals reviewed.   Constitutional:       General: She is not in acute distress.     Appearance: Normal appearance. She is not toxic-appearing.   HENT:      Head: Normocephalic and atraumatic.   Eyes:      Extraocular Movements: Extraocular movements intact.      Conjunctiva/sclera: Conjunctivae normal.   Cardiovascular:      Rate and Rhythm: Normal rate and regular rhythm.      Pulses: Normal pulses.      Heart sounds: Normal heart sounds.   Pulmonary:      Effort: Pulmonary effort is normal. No respiratory distress.      Breath sounds: Normal breath sounds. No wheezing.   Abdominal:      General: Bowel sounds are normal.      Palpations: Abdomen is soft.      Tenderness: There is no abdominal tenderness.   Musculoskeletal:         General: Normal range of motion.      Cervical back: Normal range of motion and neck supple.      Comments: Weak in the bilateral lower extremities   Neurological:      General: No focal deficit present.      Mental Status: She is alert and oriented to person, place, and time. Mental status is at baseline.           DISCHARGE MEDICATIONS        Your medication list        START taking these medications        Instructions Last Dose Given Next Dose Due   amoxicillin-pot clavulanate 875-125 mg tablet  Commonly known as: Augmentin      Take 1 tablet by mouth every 12 hours for 7 doses.              CONTINUE taking these medications        Instructions Last Dose Given Next Dose Due   buPROPion  mg 24 hr tablet  Commonly known as: Wellbutrin XL           cyanocobalamin 1,000 mcg tablet  Commonly known as: Vitamin B-12           folic acid 1 mg tablet  Commonly known as: Folvite      Take 1 tablet (1 mg) by mouth once daily. Do not start before March 28, 2024.        gabapentin 300 mg capsule  Commonly known as: Neurontin           lubricating eye drops ophthalmic solution      Administer 1 drop into both eyes 2 times a day as needed for dry eyes.       magnesium oxide 400 mg (241.3 mg magnesium) tablet  Commonly known as: Mag-Ox           meloxicam 15 mg tablet  Commonly known as: Mobic      Take 1 tablet (15 mg) by mouth once daily with breakfast.       metoprolol tartrate 50 mg tablet  Commonly known as: Lopressor           mirtazapine 15 mg tablet  Commonly known as: Remeron           montelukast 10 mg tablet  Commonly known as: Singulair           multivitamin with minerals tablet      Take 1 tablet by mouth once daily. Do not start before March 28, 2024.       pantoprazole 40 mg EC tablet  Commonly known as: ProtoNix           thiamine 100 mg tablet  Commonly known as: Vitamin B-1      Take 1 tablet (100 mg) by mouth once daily. Do not start before March 28, 2024.       Voltaren 1 % gel  Generic drug: diclofenac sodium                     Where to Get Your Medications        These medications were sent to [id number] - [place], [place] - [address]  [address] [facility] [place] [address]      Phone: [phone]   amoxicillin-pot clavulanate 875-125 mg tablet           OUTPATIENT FOLLOW-UP     No future appointments.

## 2024-12-23 LAB
ANA SER QL HEP2 SUBST: NEGATIVE
ANCA AB PATTERN SER IF-IMP: NORMAL
ANCA IGG TITR SER IF: NORMAL {TITER}
ANION GAP BLDV CALCULATED.4IONS-SCNC: 14 MMOL/L (ref 10–25)
BACTERIA BLD CULT: NORMAL
BACTERIA BLD CULT: NORMAL
BASE EXCESS BLDV CALC-SCNC: -13.8 MMOL/L (ref -2–3)
BODY TEMPERATURE: ABNORMAL
CA-I BLDV-SCNC: 1.05 MMOL/L (ref 1.1–1.33)
CHLORIDE BLDV-SCNC: 109 MMOL/L (ref 98–107)
GLUCOSE BLDV-MCNC: 245 MG/DL (ref 74–99)
HCO3 BLDV-SCNC: 12.7 MMOL/L (ref 22–26)
HCT VFR BLD EST: 29 % (ref 36–46)
HGB BLDV-MCNC: 9.6 G/DL (ref 12–16)
INHALED O2 CONCENTRATION: 32 %
LACTATE BLDV-SCNC: 1.2 MMOL/L (ref 0.4–2)
MYELOPEROXIDASE AB SER-ACNC: 0 AU/ML (ref 0–19)
OXYHGB MFR BLDV: 76.3 % (ref 45–75)
PCO2 BLDV: 31 MM HG (ref 41–51)
PH BLDV: 7.22 PH (ref 7.33–7.43)
PO2 BLDV: 39 MM HG (ref 35–45)
POTASSIUM BLDV-SCNC: 2.9 MMOL/L (ref 3.5–5.3)
PROTEINASE3 AB SER-ACNC: 0 AU/ML (ref 0–19)
SAO2 % BLDV: 78 % (ref 45–75)
SODIUM BLDV-SCNC: 133 MMOL/L (ref 136–145)

## 2024-12-25 ENCOUNTER — HOME CARE VISIT (OUTPATIENT)
Dept: HOME HEALTH SERVICES | Facility: HOME HEALTH | Age: 59
End: 2024-12-25
Payer: MEDICAID

## 2024-12-25 PROCEDURE — G0299 HHS/HOSPICE OF RN EA 15 MIN: HCPCS

## 2024-12-27 ENCOUNTER — HOME CARE VISIT (OUTPATIENT)
Dept: HOME HEALTH SERVICES | Facility: HOME HEALTH | Age: 59
End: 2024-12-27
Payer: MEDICAID

## 2024-12-27 PROCEDURE — G0152 HHCP-SERV OF OT,EA 15 MIN: HCPCS

## 2024-12-27 SDOH — ECONOMIC STABILITY: HOUSING INSECURITY: HOME SAFETY: PATIENT LIVES ALONE IN 1ST FLOOR APARTMENT.

## 2024-12-27 ASSESSMENT — ENCOUNTER SYMPTOMS
PAIN LOCATION - EXACERBATING FACTORS: ARTHRITIS
LOWEST PAIN SEVERITY IN PAST 24 HOURS: 10/10
PAIN LOCATION - PAIN SEVERITY: 10/10
PAIN LOCATION - PAIN QUALITY: ACHES
PAIN LOCATION - PAIN DURATION: CHRONIC
HIGHEST PAIN SEVERITY IN PAST 24 HOURS: 10/10
SUBJECTIVE PAIN PROGRESSION: UNCHANGED
PAIN SEVERITY GOAL: 10/10
PAIN: 1
PAIN LOCATION - PAIN FREQUENCY: CONSTANT
PAIN LOCATION: BACK
PERSON REPORTING PAIN: PATIENT

## 2024-12-27 ASSESSMENT — ACTIVITIES OF DAILY LIVING (ADL)
AMBULATION ASSISTANCE: SUPERVISION
AMBULATION ASSISTANCE: 1
DRESSING_LB_CURRENT_FUNCTION: SUPERVISION
HOUSEKEEPING ASSESSED: 1
LAUNDRY ASSESSED: 1
DRESSING_UB_CURRENT_FUNCTION: SUPERVISION
GROOMING_CURRENT_FUNCTION: SUPERVISION
LAUNDRY: DEPENDENT
GROOMING EQUIPMENT USED: SETUP
BATHING_CURRENT_FUNCTION: SUPERVISION
GROOMING ASSESSED: 1
BATHING ASSESSED: 1
TOILETING: 1
TOILETING: SUPERVISION

## 2024-12-28 ENCOUNTER — HOME CARE VISIT (OUTPATIENT)
Dept: HOME HEALTH SERVICES | Facility: HOME HEALTH | Age: 59
End: 2024-12-28
Payer: MEDICAID

## 2024-12-28 VITALS
SYSTOLIC BLOOD PRESSURE: 95 MMHG | DIASTOLIC BLOOD PRESSURE: 60 MMHG | OXYGEN SATURATION: 95 % | HEART RATE: 74 BPM | TEMPERATURE: 97.7 F

## 2024-12-28 PROCEDURE — G0151 HHCP-SERV OF PT,EA 15 MIN: HCPCS

## 2024-12-28 ASSESSMENT — ENCOUNTER SYMPTOMS
PAIN LOCATION - PAIN SEVERITY: 10/10
PAIN LOCATION - PAIN SEVERITY: 10/10
PAIN LOCATION: LEFT FOOT
PAIN LOCATION - PAIN DURATION: CHRONIC
PAIN LOCATION: BACK
CHANGE IN APPETITE: DECREASED
LIMITED RANGE OF MOTION: 1
PERSON REPORTING PAIN: PATIENT
PAIN LOCATION - PAIN SEVERITY: 7/10
PAIN LOCATION - PAIN SEVERITY: 10/10
PAIN LOCATION - PAIN SEVERITY: 10/10
PERSON REPORTING PAIN: PATIENT
APPETITE LEVEL: FAIR
PAIN LOCATION - PAIN DURATION: CHRONIC
PAIN LOCATION - PAIN DURATION: CHRONIC
PAIN: 1
PAIN LOCATION: COCCYX
PAIN LOCATION - PAIN DURATION: CHRONIC
MUSCLE WEAKNESS: 1
MUSCLE WEAKNESS: 1
PAIN LOCATION: RIGHT SHOULDER
PAIN LOCATION: RIGHT FOOT
PAIN: 1

## 2024-12-28 ASSESSMENT — BALANCE ASSESSMENTS
NUDGED SCORE: 0
EYES CLOSED AT MAXIMUM POSITION NUDGED: 0 - UNSTEADY
ARISES: 1 - ABLE, USES ARMS TO HELP
ARISING SCORE: 1
NUDGED: 0 - BEGINS TO FALL
SITTING DOWN: 1 - USES ARMS OR NOT SMOOTH MOTION
ATTEMPTS TO ARISE: 1 - ABLE, REQUIRES MORE THAN ONE ATTEMPT
TURNING 360 DEGREES STEPS: 0 - DISCONTINUOUS STEPS
BALANCE SCORE: 6
IMMEDIATE STANDING BALANCE FIRST 5 SECONDS: 1 - STEADY BUT USES WALKER OR OTHER SUPPORT
SITTING BALANCE: 1 - STEADY, SAFE
STANDING BALANCE: 1 - STEADY BUT WIDE STANCE AND USES CANE OR OTHER SUPPORT

## 2024-12-28 ASSESSMENT — ACTIVITIES OF DAILY LIVING (ADL)
ENTERING_EXITING_HOME: NEEDS ASSISTANCE
OASIS_M1830: 03
AMBULATION ASSISTANCE: ONE PERSON
CURRENT_FUNCTION: ONE PERSON
AMBULATION_DISTANCE/DURATION_TOLERATED: 25 FT
AMBULATION ASSISTANCE ON FLAT SURFACES: 1

## 2024-12-28 ASSESSMENT — GAIT ASSESSMENTS
STEP SYMMETRY: 0 - RIGHT AND LEFT STEP LENGTH NOT EQUAL
PATH: 1 - MILD/MODERATE DEVIATION OR USES WALKING AID
BALANCE AND GAIT SCORE: 14
TRUNK: 0 - MARKED SWAY OR USES WALKING AID
INITIATION OF GAIT IMMEDIATELY AFTER GO: 1 - NO HESITANCY
TRUNK SCORE: 0
GAIT SCORE: 8
WALKING STANCE: 1 - HEELS ALMOST TOUCHING WHILE WALKING
PATH SCORE: 1
STEP CONTINUITY: 1 - STEPS APPEAR CONTINUOUS

## 2024-12-28 ASSESSMENT — LIFESTYLE VARIABLES: SMOKING_STATUS: 1

## 2024-12-30 ENCOUNTER — HOME CARE VISIT (OUTPATIENT)
Dept: HOME HEALTH SERVICES | Facility: HOME HEALTH | Age: 59
End: 2024-12-30
Payer: MEDICAID

## 2024-12-31 ENCOUNTER — HOME CARE VISIT (OUTPATIENT)
Dept: HOME HEALTH SERVICES | Facility: HOME HEALTH | Age: 59
End: 2024-12-31
Payer: MEDICAID

## 2024-12-31 PROCEDURE — G0157 HHC PT ASSISTANT EA 15: HCPCS | Mod: CQ

## 2024-12-31 ASSESSMENT — ENCOUNTER SYMPTOMS
PAIN LOCATION: BACK
PAIN LOCATION: RIGHT SHOULDER
PAIN LOCATION - PAIN SEVERITY: 10/10
PAIN LOCATION: COCCYX
PERSON REPORTING PAIN: PATIENT
PAIN LOCATION - PAIN SEVERITY: 10/10
PAIN: 1
PAIN LOCATION - PAIN SEVERITY: 10/10

## 2025-01-01 ASSESSMENT — ACTIVITIES OF DAILY LIVING (ADL)
AMBULATION_DISTANCE/DURATION_TOLERATED: 50 FT X2
AMBULATION ASSISTANCE ON FLAT SURFACES: 1

## 2025-01-02 ENCOUNTER — HOME CARE VISIT (OUTPATIENT)
Dept: HOME HEALTH SERVICES | Facility: HOME HEALTH | Age: 60
End: 2025-01-02
Payer: MEDICAID

## 2025-01-02 VITALS
DIASTOLIC BLOOD PRESSURE: 69 MMHG | HEART RATE: 72 BPM | SYSTOLIC BLOOD PRESSURE: 100 MMHG | TEMPERATURE: 97.4 F | RESPIRATION RATE: 18 BRPM | OXYGEN SATURATION: 98 %

## 2025-01-02 PROCEDURE — G0299 HHS/HOSPICE OF RN EA 15 MIN: HCPCS

## 2025-01-02 ASSESSMENT — ENCOUNTER SYMPTOMS
LOWER EXTREMITY EDEMA: 1
APPETITE LEVEL: GOOD
CHANGE IN APPETITE: UNCHANGED
PAIN: 1
PAIN LOCATION: BACK
MUSCLE WEAKNESS: 1
LAST BOWEL MOVEMENT: 67206
PERSON REPORTING PAIN: PATIENT
PAIN LOCATION - PAIN SEVERITY: 7/10

## 2025-01-03 ENCOUNTER — HOME CARE VISIT (OUTPATIENT)
Dept: HOME HEALTH SERVICES | Facility: HOME HEALTH | Age: 60
End: 2025-01-03
Payer: MEDICAID

## 2025-01-03 PROCEDURE — G0157 HHC PT ASSISTANT EA 15: HCPCS | Mod: CQ

## 2025-01-03 SDOH — HEALTH STABILITY: PHYSICAL HEALTH: EXERCISE TYPE: STANDING LE STRENGTHENING

## 2025-01-03 SDOH — HEALTH STABILITY: PHYSICAL HEALTH: PHYSICAL EXERCISE: STANDING

## 2025-01-03 SDOH — HEALTH STABILITY: PHYSICAL HEALTH: EXERCISE ACTIVITY: HRTR, MARCHING, HIP ABD, EXT, HAM CURLS

## 2025-01-03 SDOH — HEALTH STABILITY: PHYSICAL HEALTH: EXERCISE ACTIVITIES SETS: 1

## 2025-01-03 SDOH — HEALTH STABILITY: PHYSICAL HEALTH: PHYSICAL EXERCISE: 15

## 2025-01-03 ASSESSMENT — ENCOUNTER SYMPTOMS
PAIN LOCATION - PAIN SEVERITY: 4/10
PAIN LOCATION: BACK
PAIN LOCATION - RELIEVING FACTORS: PRESSURE RELIEF
PAIN: 1
PAIN LOCATION: COCCYX
PAIN LOCATION - PAIN SEVERITY: 4/10
PAIN LOCATION - RELIEVING FACTORS: PRESSURE RELIEF
PERSON REPORTING PAIN: PATIENT

## 2025-01-03 ASSESSMENT — ACTIVITIES OF DAILY LIVING (ADL)
AMBULATION ASSISTANCE ON FLAT SURFACES: 1
AMBULATION_DISTANCE/DURATION_TOLERATED: 50FT X2

## 2025-01-07 ENCOUNTER — HOME CARE VISIT (OUTPATIENT)
Dept: HOME HEALTH SERVICES | Facility: HOME HEALTH | Age: 60
End: 2025-01-07
Payer: MEDICAID

## 2025-01-07 VITALS
DIASTOLIC BLOOD PRESSURE: 80 MMHG | HEART RATE: 78 BPM | OXYGEN SATURATION: 98 % | TEMPERATURE: 97.7 F | RESPIRATION RATE: 20 BRPM | SYSTOLIC BLOOD PRESSURE: 123 MMHG

## 2025-01-07 PROCEDURE — G0299 HHS/HOSPICE OF RN EA 15 MIN: HCPCS

## 2025-01-07 ASSESSMENT — ENCOUNTER SYMPTOMS
PAIN LOCATION: BACK
PAIN: 1
MUSCLE WEAKNESS: 1
LOWER EXTREMITY EDEMA: 1
LAST BOWEL MOVEMENT: 67212
CHANGE IN APPETITE: INCREASED
PERSON REPORTING PAIN: PATIENT
APPETITE LEVEL: GOOD
PAIN LOCATION - PAIN SEVERITY: 5/10

## 2025-01-08 ENCOUNTER — HOME CARE VISIT (OUTPATIENT)
Dept: HOME HEALTH SERVICES | Facility: HOME HEALTH | Age: 60
End: 2025-01-08
Payer: MEDICAID

## 2025-01-08 PROCEDURE — G0157 HHC PT ASSISTANT EA 15: HCPCS | Mod: CQ

## 2025-01-08 SDOH — HEALTH STABILITY: PHYSICAL HEALTH: EXERCISE ACTIVITIES SETS: 1

## 2025-01-08 SDOH — HEALTH STABILITY: PHYSICAL HEALTH

## 2025-01-08 SDOH — HEALTH STABILITY: PHYSICAL HEALTH: EXERCISE ACTIVITY: HRTR, MARCHING, LAQ

## 2025-01-08 SDOH — HEALTH STABILITY: PHYSICAL HEALTH: PHYSICAL EXERCISE: 10

## 2025-01-08 SDOH — HEALTH STABILITY: PHYSICAL HEALTH: PHYSICAL EXERCISE: 15

## 2025-01-08 SDOH — HEALTH STABILITY: PHYSICAL HEALTH: EXERCISE TYPE: STANDING AND SEATED LE STRENGTHENING

## 2025-01-08 SDOH — HEALTH STABILITY: PHYSICAL HEALTH: PHYSICAL EXERCISE: SEATED

## 2025-01-08 SDOH — HEALTH STABILITY: PHYSICAL HEALTH: PHYSICAL EXERCISE: STANDING

## 2025-01-08 SDOH — HEALTH STABILITY: PHYSICAL HEALTH: EXERCISE ACTIVITY: HRTR, MARCHING, HIP ABD, EXT, HAM CURLS

## 2025-01-08 ASSESSMENT — ACTIVITIES OF DAILY LIVING (ADL)
AMBULATION ASSISTANCE ON FLAT SURFACES: 1
AMBULATION_DISTANCE/DURATION_TOLERATED: 100 FT
AMBULATION ASSISTANCE ON UNEVEN SURFACES: 1

## 2025-01-08 ASSESSMENT — ENCOUNTER SYMPTOMS
PAIN LOCATION - PAIN SEVERITY: 8/10
PAIN LOCATION - PAIN SEVERITY: 7/10
PAIN: 1
PAIN LOCATION: BACK
PAIN LOCATION: RIGHT SHOULDER
PERSON REPORTING PAIN: PATIENT

## 2025-01-10 ENCOUNTER — HOME CARE VISIT (OUTPATIENT)
Dept: HOME HEALTH SERVICES | Facility: HOME HEALTH | Age: 60
End: 2025-01-10
Payer: MEDICAID

## 2025-01-10 PROCEDURE — G0157 HHC PT ASSISTANT EA 15: HCPCS | Mod: CQ

## 2025-01-10 ASSESSMENT — ENCOUNTER SYMPTOMS
PERSON REPORTING PAIN: PATIENT
PAIN: 1
PAIN LOCATION: BACK
PAIN LOCATION: RIGHT SHOULDER

## 2025-01-10 ASSESSMENT — ACTIVITIES OF DAILY LIVING (ADL)
AMBULATION_DISTANCE/DURATION_TOLERATED: 100 FT
AMBULATION ASSISTANCE ON UNEVEN SURFACES: 1

## 2025-01-14 ENCOUNTER — HOME CARE VISIT (OUTPATIENT)
Dept: HOME HEALTH SERVICES | Facility: HOME HEALTH | Age: 60
End: 2025-01-14
Payer: MEDICAID

## 2025-01-14 VITALS
DIASTOLIC BLOOD PRESSURE: 80 MMHG | OXYGEN SATURATION: 100 % | SYSTOLIC BLOOD PRESSURE: 124 MMHG | TEMPERATURE: 97.3 F | HEART RATE: 91 BPM

## 2025-01-14 PROCEDURE — G0151 HHCP-SERV OF PT,EA 15 MIN: HCPCS

## 2025-01-14 ASSESSMENT — ENCOUNTER SYMPTOMS
PAIN: 1
PAIN LOCATION: BACK
PERSON REPORTING PAIN: PATIENT
PAIN LOCATION: RIGHT SHOULDER

## 2025-01-14 ASSESSMENT — ACTIVITIES OF DAILY LIVING (ADL)
HOME_HEALTH_OASIS: 00
OASIS_M1830: 01

## 2025-01-15 LAB — GLUCOSE BLD MANUAL STRIP-MCNC: 209 MG/DL (ref 74–99)

## 2025-01-28 ENCOUNTER — TELEPHONE (OUTPATIENT)
Dept: CASE MANAGEMENT | Facility: HOSPITAL | Age: 60
End: 2025-01-28
Payer: MEDICAID

## 2025-01-28 NOTE — TELEPHONE ENCOUNTER
This is an audio only call. This audio call was conducted in the [facility].     Substance Use Navigator Specialist (OLMAN) performed an audio only follow-up call with Jolanta Castaneda at 11:53 AM     Was SUNS able to reach patient? No    If No, did OLMAN leave a voicemail message? Yes SUNS LEFT CALL BACK NUMBER AND ASKED FOR A CALL BACK     Brief summary of call:    Follow-up / Next steps:     End of call time:     Duration of audio encounter:

## 2025-02-25 ENCOUNTER — HOSPITAL ENCOUNTER (OUTPATIENT)
Dept: RADIOLOGY | Facility: HOSPITAL | Age: 60
Discharge: HOME | End: 2025-02-25
Payer: MEDICAID

## 2025-02-25 DIAGNOSIS — M53.3 SACROILIAC JOINT DYSFUNCTION OF BOTH SIDES: ICD-10-CM

## 2025-02-25 DIAGNOSIS — M25.511 PAIN IN RIGHT SHOULDER: ICD-10-CM

## 2025-02-25 PROCEDURE — 73030 X-RAY EXAM OF SHOULDER: CPT | Mod: RIGHT SIDE | Performed by: RADIOLOGY

## 2025-02-25 PROCEDURE — 73030 X-RAY EXAM OF SHOULDER: CPT | Mod: RT,LIO

## 2025-02-25 RX ORDER — MELOXICAM 15 MG/1
15 TABLET ORAL
Qty: 30 TABLET | Refills: 0 | Status: SHIPPED | OUTPATIENT
Start: 2025-02-25

## 2025-03-24 ENCOUNTER — HOSPITAL ENCOUNTER (OUTPATIENT)
Dept: RADIOLOGY | Facility: HOSPITAL | Age: 60
Discharge: HOME | End: 2025-03-24
Payer: MEDICAID

## 2025-03-24 VITALS — HEIGHT: 69 IN | BODY MASS INDEX: 23.12 KG/M2 | WEIGHT: 156.09 LBS

## 2025-03-24 DIAGNOSIS — Z12.31 ENCOUNTER FOR SCREENING MAMMOGRAM FOR MALIGNANT NEOPLASM OF BREAST: ICD-10-CM

## 2025-03-24 PROCEDURE — 77063 BREAST TOMOSYNTHESIS BI: CPT | Performed by: RADIOLOGY

## 2025-03-24 PROCEDURE — 77063 BREAST TOMOSYNTHESIS BI: CPT

## 2025-03-24 PROCEDURE — 77067 SCR MAMMO BI INCL CAD: CPT | Performed by: RADIOLOGY

## 2025-04-26 DIAGNOSIS — M53.3 SACROILIAC JOINT DYSFUNCTION OF BOTH SIDES: ICD-10-CM

## 2025-04-27 RX ORDER — MELOXICAM 15 MG/1
15 TABLET ORAL
Qty: 30 TABLET | Refills: 0 | Status: SHIPPED | OUTPATIENT
Start: 2025-04-27

## 2025-06-05 DIAGNOSIS — M53.3 SACROILIAC JOINT DYSFUNCTION OF BOTH SIDES: ICD-10-CM

## 2025-06-05 RX ORDER — MELOXICAM 15 MG/1
15 TABLET ORAL
Qty: 30 TABLET | Refills: 0 | Status: SHIPPED | OUTPATIENT
Start: 2025-06-05

## 2025-06-24 ENCOUNTER — APPOINTMENT (OUTPATIENT)
Dept: ORTHOPEDIC SURGERY | Facility: CLINIC | Age: 60
End: 2025-06-24
Payer: MEDICAID

## 2025-07-09 DIAGNOSIS — M53.3 SACROILIAC JOINT DYSFUNCTION OF BOTH SIDES: ICD-10-CM

## 2025-07-09 RX ORDER — MELOXICAM 15 MG/1
15 TABLET ORAL
Qty: 30 TABLET | Refills: 0 | Status: SHIPPED | OUTPATIENT
Start: 2025-07-09

## 2025-07-10 DIAGNOSIS — M53.3 SACROILIAC JOINT DYSFUNCTION OF BOTH SIDES: ICD-10-CM

## 2025-07-10 RX ORDER — MELOXICAM 15 MG/1
15 TABLET ORAL
Qty: 30 TABLET | Refills: 0 | Status: SHIPPED | OUTPATIENT
Start: 2025-07-10